# Patient Record
Sex: FEMALE | Race: WHITE | NOT HISPANIC OR LATINO | Employment: OTHER | URBAN - METROPOLITAN AREA
[De-identification: names, ages, dates, MRNs, and addresses within clinical notes are randomized per-mention and may not be internally consistent; named-entity substitution may affect disease eponyms.]

---

## 2017-01-03 ENCOUNTER — TELEPHONE (OUTPATIENT)
Dept: FAMILY MEDICINE CLINIC | Facility: CLINIC | Age: 72
End: 2017-01-03

## 2017-01-03 RX ORDER — SIMVASTATIN 20 MG
20 TABLET ORAL NIGHTLY
Qty: 90 TABLET | Refills: 3 | Status: SHIPPED | OUTPATIENT
Start: 2017-01-03 | End: 2017-07-27 | Stop reason: SDUPTHER

## 2017-01-03 NOTE — TELEPHONE ENCOUNTER
----- Message from Malia Triplett sent at 1/3/2017  8:42 AM EST -----  Regarding: MED REFILL  PATIENT NEEDS A 90 DAY SUPPLY OF SIMVASTATIN 20 MG TO [facility] ON FILE. PATIENT ONLY HAS 2 PILLS LEFT.

## 2017-06-23 ENCOUNTER — OFFICE VISIT (OUTPATIENT)
Dept: FAMILY MEDICINE CLINIC | Facility: CLINIC | Age: 72
End: 2017-06-23

## 2017-06-23 VITALS
HEIGHT: 65 IN | DIASTOLIC BLOOD PRESSURE: 62 MMHG | WEIGHT: 155.8 LBS | OXYGEN SATURATION: 98 % | TEMPERATURE: 98.1 F | BODY MASS INDEX: 25.96 KG/M2 | SYSTOLIC BLOOD PRESSURE: 108 MMHG | HEART RATE: 82 BPM

## 2017-06-23 DIAGNOSIS — J06.9 ACUTE URI: Primary | ICD-10-CM

## 2017-06-23 PROCEDURE — 99213 OFFICE O/P EST LOW 20 MIN: CPT | Performed by: FAMILY MEDICINE

## 2017-06-23 RX ORDER — GUAIFENESIN AND CODEINE PHOSPHATE 100; 10 MG/5ML; MG/5ML
5 SOLUTION ORAL 3 TIMES DAILY PRN
Qty: 118 ML | Refills: 0 | Status: SHIPPED | OUTPATIENT
Start: 2017-06-23 | End: 2017-07-27

## 2017-06-23 RX ORDER — AZITHROMYCIN 250 MG/1
TABLET, FILM COATED ORAL
Qty: 5 TABLET | Refills: 0 | Status: SHIPPED | OUTPATIENT
Start: 2017-06-23 | End: 2017-07-27

## 2017-06-23 NOTE — PROGRESS NOTES
Subjective   Jacques Yeboah is a 72 y.o. female.     History of Present Illness   THREE DAYS OF COUGH, HEAD CONGESTION, NO FEVER.  No GI upset. Took no medication.   The following portions of the patient's history were reviewed and updated as appropriate: allergies, current medications, past family history, past medical history, past social history, past surgical history and problem list.    Review of Systems   Constitutional: Negative for fever.   HENT: Positive for congestion. Negative for ear pain, sinus pressure and sneezing.    Respiratory: Positive for cough (X2 DAYS). Negative for shortness of breath.    Cardiovascular: Negative.    Musculoskeletal: Negative.        Objective   Physical Exam   Constitutional: She appears well-developed.   HENT:   Right Ear: External ear normal.   Left Ear: External ear normal.   Mouth/Throat: Oropharynx is clear and moist.   Head congestion   Neck: Neck supple.   Pulmonary/Chest: Effort normal.   Lymphadenopathy:     She has no cervical adenopathy.   Vitals reviewed.      Assessment/Plan   Jacques was seen today for uri.    Diagnoses and all orders for this visit:    Acute URI  -     azithromycin (ZITHROMAX) 250 MG tablet; Take 2 tablets the first day, then 1 tablet daily for 4 days.  -     guaifenesin-codeine (GUAIFENESIN AC) 100-10 MG/5ML liquid; Take 5 mL by mouth 3 (Three) Times a Day As Needed for Cough.

## 2017-07-14 ENCOUNTER — APPOINTMENT (OUTPATIENT)
Dept: CT IMAGING | Facility: HOSPITAL | Age: 72
End: 2017-07-14

## 2017-07-14 ENCOUNTER — HOSPITAL ENCOUNTER (EMERGENCY)
Facility: HOSPITAL | Age: 72
Discharge: HOME | End: 2017-07-14
Attending: EMERGENCY MEDICINE | Admitting: EMERGENCY MEDICINE

## 2017-07-14 VITALS
HEIGHT: 65 IN | WEIGHT: 155 LBS | BODY MASS INDEX: 25.83 KG/M2 | TEMPERATURE: 98 F | OXYGEN SATURATION: 99 % | HEART RATE: 68 BPM | RESPIRATION RATE: 20 BRPM | SYSTOLIC BLOOD PRESSURE: 148 MMHG | DIASTOLIC BLOOD PRESSURE: 88 MMHG

## 2017-07-14 DIAGNOSIS — R10.9 ACUTE FLANK PAIN: Primary | ICD-10-CM

## 2017-07-14 LAB
ALBUMIN SERPL-MCNC: 4.5 G/DL (ref 3.2–4.8)
ALBUMIN/GLOB SERPL: 1.4 G/DL (ref 1.5–2.5)
ALP SERPL-CCNC: 66 U/L (ref 25–100)
ALT SERPL W P-5'-P-CCNC: 18 U/L (ref 7–40)
ANION GAP SERPL CALCULATED.3IONS-SCNC: 6 MMOL/L (ref 3–11)
AST SERPL-CCNC: 24 U/L (ref 0–33)
BACTERIA UR QL AUTO: ABNORMAL /HPF
BASOPHILS # BLD AUTO: 0.03 10*3/MM3 (ref 0–0.2)
BASOPHILS NFR BLD AUTO: 0.6 % (ref 0–1)
BILIRUB SERPL-MCNC: 0.3 MG/DL (ref 0.3–1.2)
BILIRUB UR QL STRIP: NEGATIVE
BUN BLD-MCNC: 22 MG/DL (ref 9–23)
BUN/CREAT SERPL: 31.4 (ref 7–25)
CALCIUM SPEC-SCNC: 10.3 MG/DL (ref 8.7–10.4)
CHLORIDE SERPL-SCNC: 100 MMOL/L (ref 99–109)
CLARITY UR: CLEAR
CO2 SERPL-SCNC: 28 MMOL/L (ref 20–31)
COLOR UR: YELLOW
CREAT BLD-MCNC: 0.7 MG/DL (ref 0.6–1.3)
DEPRECATED RDW RBC AUTO: 47 FL (ref 37–54)
EOSINOPHIL # BLD AUTO: 0.11 10*3/MM3 (ref 0–0.3)
EOSINOPHIL NFR BLD AUTO: 2 % (ref 0–3)
ERYTHROCYTE [DISTWIDTH] IN BLOOD BY AUTOMATED COUNT: 13.2 % (ref 11.3–14.5)
GFR SERPL CREATININE-BSD FRML MDRD: 82 ML/MIN/1.73
GLOBULIN UR ELPH-MCNC: 3.2 GM/DL
GLUCOSE BLD-MCNC: 106 MG/DL (ref 70–100)
GLUCOSE UR STRIP-MCNC: NEGATIVE MG/DL
HCT VFR BLD AUTO: 43.3 % (ref 34.5–44)
HGB BLD-MCNC: 13.8 G/DL (ref 11.5–15.5)
HGB UR QL STRIP.AUTO: NEGATIVE
HOLD SPECIMEN: NORMAL
HOLD SPECIMEN: NORMAL
HYALINE CASTS UR QL AUTO: ABNORMAL /LPF
IMM GRANULOCYTES # BLD: 0 10*3/MM3 (ref 0–0.03)
IMM GRANULOCYTES NFR BLD: 0 % (ref 0–0.6)
KETONES UR QL STRIP: NEGATIVE
LEUKOCYTE ESTERASE UR QL STRIP.AUTO: ABNORMAL
LIPASE SERPL-CCNC: 51 U/L (ref 6–51)
LYMPHOCYTES # BLD AUTO: 1.79 10*3/MM3 (ref 0.6–4.8)
LYMPHOCYTES NFR BLD AUTO: 33.2 % (ref 24–44)
MCH RBC QN AUTO: 30.8 PG (ref 27–31)
MCHC RBC AUTO-ENTMCNC: 31.9 G/DL (ref 32–36)
MCV RBC AUTO: 96.7 FL (ref 80–99)
MONOCYTES # BLD AUTO: 0.4 10*3/MM3 (ref 0–1)
MONOCYTES NFR BLD AUTO: 7.4 % (ref 0–12)
NEUTROPHILS # BLD AUTO: 3.06 10*3/MM3 (ref 1.5–8.3)
NEUTROPHILS NFR BLD AUTO: 56.8 % (ref 41–71)
NITRITE UR QL STRIP: NEGATIVE
PH UR STRIP.AUTO: 7 [PH] (ref 5–8)
PLATELET # BLD AUTO: 221 10*3/MM3 (ref 150–450)
PMV BLD AUTO: 9.7 FL (ref 6–12)
POTASSIUM BLD-SCNC: 4.1 MMOL/L (ref 3.5–5.5)
PROT SERPL-MCNC: 7.7 G/DL (ref 5.7–8.2)
PROT UR QL STRIP: NEGATIVE
RBC # BLD AUTO: 4.48 10*6/MM3 (ref 3.89–5.14)
RBC # UR: ABNORMAL /HPF
REF LAB TEST METHOD: ABNORMAL
SODIUM BLD-SCNC: 134 MMOL/L (ref 132–146)
SP GR UR STRIP: 1.01 (ref 1–1.03)
SQUAMOUS #/AREA URNS HPF: ABNORMAL /HPF
UROBILINOGEN UR QL STRIP: ABNORMAL
WBC NRBC COR # BLD: 5.39 10*3/MM3 (ref 3.5–10.8)
WBC UR QL AUTO: ABNORMAL /HPF
WHOLE BLOOD HOLD SPECIMEN: NORMAL
WHOLE BLOOD HOLD SPECIMEN: NORMAL

## 2017-07-14 PROCEDURE — 36415 COLL VENOUS BLD VENIPUNCTURE: CPT

## 2017-07-14 PROCEDURE — 74176 CT ABD & PELVIS W/O CONTRAST: CPT

## 2017-07-14 PROCEDURE — 83690 ASSAY OF LIPASE: CPT | Performed by: EMERGENCY MEDICINE

## 2017-07-14 PROCEDURE — 81001 URINALYSIS AUTO W/SCOPE: CPT | Performed by: EMERGENCY MEDICINE

## 2017-07-14 PROCEDURE — 80053 COMPREHEN METABOLIC PANEL: CPT | Performed by: EMERGENCY MEDICINE

## 2017-07-14 PROCEDURE — 99283 EMERGENCY DEPT VISIT LOW MDM: CPT

## 2017-07-14 PROCEDURE — 85025 COMPLETE CBC W/AUTO DIFF WBC: CPT | Performed by: EMERGENCY MEDICINE

## 2017-07-14 PROCEDURE — 87086 URINE CULTURE/COLONY COUNT: CPT | Performed by: EMERGENCY MEDICINE

## 2017-07-14 RX ORDER — SODIUM CHLORIDE 0.9 % (FLUSH) 0.9 %
10 SYRINGE (ML) INJECTION AS NEEDED
Status: DISCONTINUED | OUTPATIENT
Start: 2017-07-14 | End: 2017-07-14 | Stop reason: HOSPADM

## 2017-07-14 RX ORDER — HYDROCODONE BITARTRATE AND ACETAMINOPHEN 5; 325 MG/1; MG/1
1 TABLET ORAL EVERY 8 HOURS PRN
Qty: 10 TABLET | Refills: 0 | Status: SHIPPED | OUTPATIENT
Start: 2017-07-14 | End: 2017-07-27

## 2017-07-14 NOTE — DISCHARGE INSTRUCTIONS
Take medication as prescribed for pain.  No strenuous activity.  Your urine specimen today will be sent for culture.  We will contact you with any positive urine culture results.  Return to ER with worsening symptoms or development of fever, or new symptoms.

## 2017-07-14 NOTE — ED PROVIDER NOTES
Subjective   Patient is a 72 y.o. female presenting with flank pain.   History provided by:  Patient and relative  Flank Pain   Pain location:  L flank  Pain quality: aching and sharp    Pain radiates to:  Suprapubic region  Pain severity:  Moderate  Onset quality:  Gradual  Duration:  8 hours  Timing:  Constant  Progression:  Unchanged  Chronicity:  New  Context: awakening from sleep    Relieved by:  Lying down  Worsened by:  Nothing  Ineffective treatments:  None tried  Associated symptoms: no chills, no cough, no diarrhea, no dysuria, no fever, no hematuria, no nausea, no shortness of breath and no vomiting    Risk factors: being elderly        Review of Systems   Constitutional: Negative for chills and fever.   Respiratory: Negative for cough and shortness of breath.    Gastrointestinal: Negative for diarrhea, nausea and vomiting.   Genitourinary: Positive for flank pain. Negative for dysuria and hematuria.   Neurological: Negative for dizziness and light-headedness.   All other systems reviewed and are negative.      Past Medical History:   Diagnosis Date   • H/O bone density study    • History of colonoscopy    • History of mammogram    • Impacted cerumen of right ear     Ears needs rinsed out, feels clogged two days.        No Known Allergies    Past Surgical History:   Procedure Laterality Date   • TONSILLECTOMY     • TUBAL ABDOMINAL LIGATION         Family History   Problem Relation Age of Onset   • Heart failure Mother      CHF   • Heart attack Father      Acute MI   • Stroke Father      Stroke syndrome       Social History     Social History   • Marital status:      Spouse name: N/A   • Number of children: N/A   • Years of education: N/A     Social History Main Topics   • Smoking status: Never Smoker   • Smokeless tobacco: Never Used   • Alcohol use No   • Drug use: No   • Sexual activity: Not Asked     Other Topics Concern   • None     Social History Narrative           Objective   Physical Exam  "  Constitutional: She is oriented to person, place, and time. She appears well-developed and well-nourished.   HENT:   Right Ear: External ear normal.   Left Ear: External ear normal.   Cardiovascular: Normal rate, regular rhythm, normal heart sounds and intact distal pulses.    Pulmonary/Chest: Effort normal and breath sounds normal.   Abdominal: Soft. Bowel sounds are normal. She exhibits no distension. There is tenderness (mild suprapubic) in the suprapubic area. There is no CVA tenderness and no tenderness at McBurney's point.   Neurological: She is alert and oriented to person, place, and time.   Skin: Skin is warm and dry.   Psychiatric: She has a normal mood and affect. Her behavior is normal.       Procedures         ED Course  ED Course   Value Comment By Time   CT Abdomen Pelvis Without Contrast (Reviewed) Gricelda Rothman, XI 07/14 1814      No results found for this or any previous visit (from the past 24 hour(s)).  Note: In addition to lab results from this visit, the labs listed above may include labs taken at another facility or during a different encounter within the last 24 hours. Please correlate lab times with ED admission and discharge times for further clarification of the services performed during this visit.    CT Abdomen Pelvis Without Contrast   Final Result   Negative CT scan of the abdomen pelvis. There is   specifically no evidence of renal mass, stone or obstructive uropathy.       DICTATED:     07/14/2017   EDITED:         07/14/2017           This report was finalized on 7/14/2017 5:51 PM by Dr. Kevin Maxwell MD.            Vitals:    07/14/17 1359 07/14/17 1910   BP: 165/72 148/88   BP Location: Left arm    Patient Position: Sitting    Pulse: 67 68   Resp: 18 20   Temp: 98 °F (36.7 °C)    TempSrc: Oral    SpO2: 99%    Weight: 155 lb (70.3 kg)    Height: 65\" (165.1 cm)      Medications - No data to display  ECG/EMG Results (last 24 hours)     ** No results found for the last 24 hours. ** "        Discussed lab and CT findings with pt and family . Explained that urine will be sent for culture and that if something does grow out, we will contact her at home and treat as appropriate. Pt advised to f/u with PCP in 2-3 days or return to ED with worsening of symptoms or development of new symptoms. Pt and family verb understanding/,      ADRIAN query complete. Treatment plan to include limited course of prescribed  controlled substance. Risks including addiction, benefits, and alternatives presented to patient.           MDM    Final diagnoses:   Acute flank pain            Gricelda Rothman, XI  07/25/17 0650

## 2017-07-16 LAB — BACTERIA SPEC AEROBE CULT: NORMAL

## 2017-07-27 ENCOUNTER — OFFICE VISIT (OUTPATIENT)
Dept: FAMILY MEDICINE CLINIC | Facility: CLINIC | Age: 72
End: 2017-07-27

## 2017-07-27 VITALS
BODY MASS INDEX: 25.46 KG/M2 | SYSTOLIC BLOOD PRESSURE: 120 MMHG | HEART RATE: 68 BPM | DIASTOLIC BLOOD PRESSURE: 80 MMHG | WEIGHT: 153 LBS | TEMPERATURE: 98.1 F | RESPIRATION RATE: 16 BRPM

## 2017-07-27 DIAGNOSIS — E78.5 HYPERLIPIDEMIA, UNSPECIFIED HYPERLIPIDEMIA TYPE: Primary | ICD-10-CM

## 2017-07-27 LAB
ALBUMIN SERPL-MCNC: 4.4 G/DL (ref 3.2–4.8)
ALBUMIN/GLOB SERPL: 1.6 G/DL (ref 1.5–2.5)
ALP SERPL-CCNC: 62 U/L (ref 25–100)
ALT SERPL W P-5'-P-CCNC: 20 U/L (ref 7–40)
ANION GAP SERPL CALCULATED.3IONS-SCNC: 1 MMOL/L (ref 3–11)
ARTICHOKE IGE QN: 81 MG/DL (ref 0–130)
AST SERPL-CCNC: 21 U/L (ref 0–33)
BASOPHILS # BLD AUTO: 0.03 10*3/MM3 (ref 0–0.2)
BASOPHILS NFR BLD AUTO: 0.7 % (ref 0–1)
BILIRUB SERPL-MCNC: 0.5 MG/DL (ref 0.3–1.2)
BUN BLD-MCNC: 25 MG/DL (ref 9–23)
BUN/CREAT SERPL: 35.7 (ref 7–25)
CALCIUM SPEC-SCNC: 9.9 MG/DL (ref 8.7–10.4)
CHLORIDE SERPL-SCNC: 105 MMOL/L (ref 99–109)
CHOLEST SERPL-MCNC: 153 MG/DL (ref 0–200)
CO2 SERPL-SCNC: 34 MMOL/L (ref 20–31)
CREAT BLD-MCNC: 0.7 MG/DL (ref 0.6–1.3)
DEPRECATED RDW RBC AUTO: 47.2 FL (ref 37–54)
EOSINOPHIL # BLD AUTO: 0.09 10*3/MM3 (ref 0–0.3)
EOSINOPHIL NFR BLD AUTO: 2 % (ref 0–3)
ERYTHROCYTE [DISTWIDTH] IN BLOOD BY AUTOMATED COUNT: 13.3 % (ref 11.3–14.5)
GFR SERPL CREATININE-BSD FRML MDRD: 82 ML/MIN/1.73
GLOBULIN UR ELPH-MCNC: 2.7 GM/DL
GLUCOSE BLD-MCNC: 93 MG/DL (ref 70–100)
HCT VFR BLD AUTO: 43.6 % (ref 34.5–44)
HDLC SERPL-MCNC: 50 MG/DL (ref 40–60)
HGB BLD-MCNC: 13.7 G/DL (ref 11.5–15.5)
IMM GRANULOCYTES # BLD: 0 10*3/MM3 (ref 0–0.03)
IMM GRANULOCYTES NFR BLD: 0 % (ref 0–0.6)
LYMPHOCYTES # BLD AUTO: 1.55 10*3/MM3 (ref 0.6–4.8)
LYMPHOCYTES NFR BLD AUTO: 34.1 % (ref 24–44)
MCH RBC QN AUTO: 30.5 PG (ref 27–31)
MCHC RBC AUTO-ENTMCNC: 31.4 G/DL (ref 32–36)
MCV RBC AUTO: 97.1 FL (ref 80–99)
MONOCYTES # BLD AUTO: 0.4 10*3/MM3 (ref 0–1)
MONOCYTES NFR BLD AUTO: 8.8 % (ref 0–12)
NEUTROPHILS # BLD AUTO: 2.47 10*3/MM3 (ref 1.5–8.3)
NEUTROPHILS NFR BLD AUTO: 54.4 % (ref 41–71)
PLATELET # BLD AUTO: 249 10*3/MM3 (ref 150–450)
PMV BLD AUTO: 10.3 FL (ref 6–12)
POTASSIUM BLD-SCNC: 4.8 MMOL/L (ref 3.5–5.5)
PROT SERPL-MCNC: 7.1 G/DL (ref 5.7–8.2)
RBC # BLD AUTO: 4.49 10*6/MM3 (ref 3.89–5.14)
SODIUM BLD-SCNC: 140 MMOL/L (ref 132–146)
TRIGL SERPL-MCNC: 90 MG/DL (ref 0–150)
WBC NRBC COR # BLD: 4.54 10*3/MM3 (ref 3.5–10.8)

## 2017-07-27 PROCEDURE — 80061 LIPID PANEL: CPT | Performed by: FAMILY MEDICINE

## 2017-07-27 PROCEDURE — 85025 COMPLETE CBC W/AUTO DIFF WBC: CPT | Performed by: FAMILY MEDICINE

## 2017-07-27 PROCEDURE — 99213 OFFICE O/P EST LOW 20 MIN: CPT | Performed by: FAMILY MEDICINE

## 2017-07-27 PROCEDURE — 80053 COMPREHEN METABOLIC PANEL: CPT | Performed by: FAMILY MEDICINE

## 2017-07-27 PROCEDURE — 36415 COLL VENOUS BLD VENIPUNCTURE: CPT | Performed by: FAMILY MEDICINE

## 2017-07-27 NOTE — PROGRESS NOTES
Subjective   Jacques Yeboah is a 72 y.o. female.     History of Present Illness   Scheduled for total right hip replacement in September.    Needs cholesterol level.   The following portions of the patient's history were reviewed and updated as appropriate: allergies, current medications, past family history, past medical history, past social history, past surgical history and problem list.    Review of Systems   Constitutional: Negative.    Respiratory: Negative.    Cardiovascular: Negative.    Gastrointestinal: Negative for abdominal distention, abdominal pain and constipation.   Musculoskeletal: Positive for arthralgias.   Skin: Negative.        Objective   Physical Exam   Constitutional: She appears well-developed and well-nourished. No distress.   Pulmonary/Chest: Effort normal.   Musculoskeletal: She exhibits no edema.   Skin: Skin is warm.   Psychiatric: She has a normal mood and affect.   Vitals reviewed.      Assessment/Plan   Jacques was seen today for labs only.    Diagnoses and all orders for this visit:    Hyperlipidemia, unspecified hyperlipidemia type  -     Lipid Panel  -     Comprehensive Metabolic Panel  -     CBC & Differential

## 2017-09-05 ENCOUNTER — TELEPHONE (OUTPATIENT)
Dept: ORTHOPEDIC SURGERY | Facility: CLINIC | Age: 72
End: 2017-09-05

## 2017-09-05 NOTE — TELEPHONE ENCOUNTER
Patient would like a refill of Tramadol 50mg, she has 10 pills left. She is scheduled 10/3/17 for total hip surgery.

## 2017-09-21 ENCOUNTER — APPOINTMENT (OUTPATIENT)
Dept: PREADMISSION TESTING | Facility: HOSPITAL | Age: 72
End: 2017-09-21

## 2017-09-21 ENCOUNTER — HOSPITAL ENCOUNTER (OUTPATIENT)
Dept: GENERAL RADIOLOGY | Facility: HOSPITAL | Age: 72
Discharge: HOME | End: 2017-09-21
Admitting: ORTHOPAEDIC SURGERY

## 2017-09-21 VITALS — BODY MASS INDEX: 24.98 KG/M2 | WEIGHT: 149.91 LBS | HEIGHT: 65 IN

## 2017-09-21 DIAGNOSIS — Z01.89 LABORATORY TEST: Primary | ICD-10-CM

## 2017-09-21 LAB
ABO GROUP BLD: NORMAL
ANION GAP SERPL CALCULATED.3IONS-SCNC: 4 MMOL/L (ref 3–11)
APTT PPP: 26.8 SECONDS (ref 24–31)
BASOPHILS # BLD AUTO: 0.02 10*3/MM3 (ref 0–0.2)
BASOPHILS NFR BLD AUTO: 0.4 % (ref 0–1)
BILIRUB UR QL STRIP: NEGATIVE
BUN BLD-MCNC: 24 MG/DL (ref 9–23)
BUN/CREAT SERPL: 34.3 (ref 7–25)
CALCIUM SPEC-SCNC: 9.3 MG/DL (ref 8.7–10.4)
CHLORIDE SERPL-SCNC: 105 MMOL/L (ref 99–109)
CLARITY UR: CLEAR
CO2 SERPL-SCNC: 30 MMOL/L (ref 20–31)
COLOR UR: YELLOW
CREAT BLD-MCNC: 0.7 MG/DL (ref 0.6–1.3)
CRP SERPL-MCNC: 0.07 MG/DL (ref 0–1)
DEPRECATED RDW RBC AUTO: 46.5 FL (ref 37–54)
EOSINOPHIL # BLD AUTO: 0.09 10*3/MM3 (ref 0–0.3)
EOSINOPHIL NFR BLD AUTO: 1.8 % (ref 0–3)
ERYTHROCYTE [DISTWIDTH] IN BLOOD BY AUTOMATED COUNT: 13.2 % (ref 11.3–14.5)
ERYTHROCYTE [SEDIMENTATION RATE] IN BLOOD: 22 MM/HR (ref 0–30)
GFR SERPL CREATININE-BSD FRML MDRD: 82 ML/MIN/1.73
GLUCOSE BLD-MCNC: 112 MG/DL (ref 70–100)
GLUCOSE UR STRIP-MCNC: NEGATIVE MG/DL
HBA1C MFR BLD: 5.4 % (ref 4.8–5.6)
HCT VFR BLD AUTO: 38.8 % (ref 34.5–44)
HGB BLD-MCNC: 12.8 G/DL (ref 11.5–15.5)
HGB UR QL STRIP.AUTO: NEGATIVE
IMM GRANULOCYTES # BLD: 0.01 10*3/MM3 (ref 0–0.03)
IMM GRANULOCYTES NFR BLD: 0.2 % (ref 0–0.6)
INR PPP: 0.97
KETONES UR QL STRIP: NEGATIVE
LEUKOCYTE ESTERASE UR QL STRIP.AUTO: NEGATIVE
LYMPHOCYTES # BLD AUTO: 1.63 10*3/MM3 (ref 0.6–4.8)
LYMPHOCYTES NFR BLD AUTO: 33.1 % (ref 24–44)
MCH RBC QN AUTO: 31.2 PG (ref 27–31)
MCHC RBC AUTO-ENTMCNC: 33 G/DL (ref 32–36)
MCV RBC AUTO: 94.6 FL (ref 80–99)
MONOCYTES # BLD AUTO: 0.26 10*3/MM3 (ref 0–1)
MONOCYTES NFR BLD AUTO: 5.3 % (ref 0–12)
NEUTROPHILS # BLD AUTO: 2.91 10*3/MM3 (ref 1.5–8.3)
NEUTROPHILS NFR BLD AUTO: 59.2 % (ref 41–71)
NITRITE UR QL STRIP: NEGATIVE
PH UR STRIP.AUTO: 7 [PH] (ref 5–8)
PLATELET # BLD AUTO: 236 10*3/MM3 (ref 150–450)
PMV BLD AUTO: 9.4 FL (ref 6–12)
POTASSIUM BLD-SCNC: 3.9 MMOL/L (ref 3.5–5.5)
PROT UR QL STRIP: NEGATIVE
PROTHROMBIN TIME: 10.6 SECONDS (ref 9.6–11.5)
RBC # BLD AUTO: 4.1 10*6/MM3 (ref 3.89–5.14)
RH BLD: NEGATIVE
SODIUM BLD-SCNC: 139 MMOL/L (ref 132–146)
SP GR UR STRIP: 1.01 (ref 1–1.03)
UROBILINOGEN UR QL STRIP: NORMAL
WBC NRBC COR # BLD: 4.92 10*3/MM3 (ref 3.5–10.8)

## 2017-09-21 PROCEDURE — 83036 HEMOGLOBIN GLYCOSYLATED A1C: CPT | Performed by: ORTHOPAEDIC SURGERY

## 2017-09-21 PROCEDURE — 86140 C-REACTIVE PROTEIN: CPT | Performed by: ORTHOPAEDIC SURGERY

## 2017-09-21 PROCEDURE — 85730 THROMBOPLASTIN TIME PARTIAL: CPT | Performed by: ORTHOPAEDIC SURGERY

## 2017-09-21 PROCEDURE — 85610 PROTHROMBIN TIME: CPT | Performed by: ORTHOPAEDIC SURGERY

## 2017-09-21 PROCEDURE — 86901 BLOOD TYPING SEROLOGIC RH(D): CPT

## 2017-09-21 PROCEDURE — 80048 BASIC METABOLIC PNL TOTAL CA: CPT | Performed by: ORTHOPAEDIC SURGERY

## 2017-09-21 PROCEDURE — 36415 COLL VENOUS BLD VENIPUNCTURE: CPT

## 2017-09-21 PROCEDURE — 85652 RBC SED RATE AUTOMATED: CPT | Performed by: ORTHOPAEDIC SURGERY

## 2017-09-21 PROCEDURE — 93005 ELECTROCARDIOGRAM TRACING: CPT

## 2017-09-21 PROCEDURE — 93010 ELECTROCARDIOGRAM REPORT: CPT | Performed by: INTERNAL MEDICINE

## 2017-09-21 PROCEDURE — 81003 URINALYSIS AUTO W/O SCOPE: CPT | Performed by: ORTHOPAEDIC SURGERY

## 2017-09-21 PROCEDURE — 86900 BLOOD TYPING SEROLOGIC ABO: CPT

## 2017-09-21 PROCEDURE — 85025 COMPLETE CBC W/AUTO DIFF WBC: CPT | Performed by: ORTHOPAEDIC SURGERY

## 2017-09-21 PROCEDURE — 71020 HC CHEST PA AND LATERAL: CPT

## 2017-09-21 RX ORDER — PHENOL 1.4 %
600 AEROSOL, SPRAY (ML) MUCOUS MEMBRANE 2 TIMES DAILY WITH MEALS
COMMUNITY
End: 2019-04-03 | Stop reason: ALTCHOICE

## 2017-09-21 RX ORDER — SIMVASTATIN 20 MG
20 TABLET ORAL NIGHTLY
COMMUNITY
End: 2018-07-27 | Stop reason: SDUPTHER

## 2017-09-21 ASSESSMENT — HOOS JR
HOOS JR SCORE: 11
HOOS JR SCORE: 55.985

## 2017-09-21 NOTE — PAT
Johnie calix measurements:  Length: 24  Width: 18  Too early for type and screen; please draw the morning of surgery.  Patient attended joint class on 9-21-17.

## 2017-09-25 ENCOUNTER — OFFICE VISIT (OUTPATIENT)
Dept: ORTHOPEDIC SURGERY | Facility: CLINIC | Age: 72
End: 2017-09-25

## 2017-09-25 VITALS
WEIGHT: 143.6 LBS | HEIGHT: 65 IN | HEART RATE: 71 BPM | SYSTOLIC BLOOD PRESSURE: 152 MMHG | DIASTOLIC BLOOD PRESSURE: 81 MMHG | BODY MASS INDEX: 23.93 KG/M2

## 2017-09-25 DIAGNOSIS — M16.11 PRIMARY OSTEOARTHRITIS OF RIGHT HIP: Primary | ICD-10-CM

## 2017-09-25 PROCEDURE — 99213 OFFICE O/P EST LOW 20 MIN: CPT | Performed by: ORTHOPAEDIC SURGERY

## 2017-09-25 NOTE — PROGRESS NOTES
[facility] [place]    Subjective     Chief Complaint   Patient presents with   • Right Knee - Follow-up     Pre op         HPI    Jacques Yeboah is a 72 y.o. female. She presents today prior to embarking on her right total hip arthroplasty.  She has been to her preadmission testing.  Pain has been continuous in the hip, which is moderate in severity most of the time, but can be severe, worse with standing and walking.  She still desires to proceed with her right total hip arthroplasty next week.      Patient Active Problem List   Diagnosis   • Hyperlipidemia     Past Medical History:   Diagnosis Date   • Arthritis    • H/O bone density study    • Hip pain    • History of colonoscopy    • History of mammogram    • Impacted cerumen of right ear     Ears needs rinsed out, feels clogged two days.    • Inflammatory arthritis    • Lumbago    • Lumbar degenerative disc disease    • Metatarsalgia, left foot    • Osteoarthritis of knees, bilateral    • Pelvic fracture    • Primary osteoarthritis of both hips    • Sciatica    • Thoracic spondylosis    • Wears glasses       Past Surgical History:   Procedure Laterality Date   • COLONOSCOPY     • EYE SURGERY     • FRACTURE SURGERY      left wrist; pelvis   • TONSILLECTOMY     • TUBAL ABDOMINAL LIGATION        Family History   Problem Relation Age of Onset   • Heart failure Mother      CHF   • Osteoarthritis Mother    • Heart attack Father      Acute MI   • Stroke Father      Stroke syndrome     Social History     Social History   • Marital status:      Spouse name: N/A   • Number of children: N/A   • Years of education: N/A     Occupational History   • Not on file.     Social History Main Topics   • Smoking status: Never Smoker   • Smokeless tobacco: Never Used   • Alcohol use No   • Drug use: No   • Sexual activity: Defer     Other Topics Concern   • Not on file     Social History Narrative      Current Outpatient Prescriptions on File Prior  to Visit   Medication Sig Dispense Refill   • calcium carbonate (OS-SKYLER) 600 MG tablet Take 600 mg by mouth 2 (Two) Times a Day With Meals.     • simvastatin (ZOCOR) 20 MG tablet Take 20 mg by mouth Every Night.       No current facility-administered medications on file prior to visit.       No Known Allergies     Review of Systems   Constitutional: Negative for activity change, appetite change, chills, diaphoresis, fatigue, fever and unexpected weight change.   HENT: Negative for congestion, dental problem, drooling, ear discharge, ear pain, facial swelling, hearing loss, mouth sores, nosebleeds, postnasal drip, rhinorrhea, sinus pressure, sneezing, sore throat, tinnitus, trouble swallowing and voice change.    Eyes: Negative for photophobia, pain, discharge, redness, itching and visual disturbance.   Respiratory: Negative for apnea, cough, choking, chest tightness, shortness of breath, wheezing and stridor.    Cardiovascular: Negative for chest pain, palpitations and leg swelling.   Gastrointestinal: Negative for abdominal distention, abdominal pain, anal bleeding, blood in stool, constipation, diarrhea, nausea, rectal pain and vomiting.   Endocrine: Negative for cold intolerance, heat intolerance, polydipsia, polyphagia and polyuria.   Genitourinary: Negative for decreased urine volume, difficulty urinating, dysuria, enuresis, flank pain, frequency, genital sores, hematuria and urgency.   Musculoskeletal: Positive for arthralgias, back pain and gait problem. Negative for joint swelling, myalgias, neck pain and neck stiffness.   Skin: Negative for color change, pallor, rash and wound.   Allergic/Immunologic: Negative for environmental allergies, food allergies and immunocompromised state.   Neurological: Negative for dizziness, tremors, seizures, syncope, facial asymmetry, speech difficulty, weakness, light-headedness, numbness and headaches.   Hematological: Negative for adenopathy. Does not bruise/bleed easily.  "  Psychiatric/Behavioral: Negative for agitation, behavioral problems, confusion, decreased concentration, dysphoric mood, hallucinations, self-injury, sleep disturbance and suicidal ideas. The patient is not nervous/anxious and is not hyperactive.         Objective      Physical Exam  /81  Pulse 71  Ht 64.6\" (164.1 cm)  Wt 143 lb 9.6 oz (65.1 kg)  BMI 24.19 kg/m2    Body mass index is 24.19 kg/(m^2).    General:   Mental Status:  Alert   Appearance: Cooperative, in no acute distress   Build and Nutrition: Well-nourished and well developed female   Orientation: Alert and oriented to person, place and time   Posture: Normal   Gait: Normal      Lower Extremity:   Right Hip:    Tenderness:  None    Swelling:  None    Crepitus:  None    Range of motion:  External Rotation: 30°       Internal Rotation: 20°, with pain in the groin       Flexion:  90°       Extension:  0°    Deformities:  None  Functional testing: Positive Stinchfield    No leg length discrepancy      Imaging/Studies  Previous radiographs were reviewed, which showed advanced arthritic changes in the hip joint, with bone-on-bone contact and osteophyte formation.      Assessment and Plan     Jacques was seen today for follow-up.    Diagnoses and all orders for this visit:    Primary osteoarthritis of right hip      I reviewed my findings again with the patient today.  She is ready to proceed with her right total hip arthroplasty.  Her  was present for the discussion today.  Questions were answered.  I will see her for surgery next week, but sooner for any problems.    Return in about 4 weeks (around 10/23/2017) for After Surgery.     Surgical Counseling     I have informed the patient of the diagnosis and the prognosis.  Exhaustive conservative treatment modalities have not resulted in long term pain relief.  The symptoms have progressed to the point of daily pain and inability to perform activities of daily living without significant pain.  " The patient has reached the point of desiring to proceed with total hip arthroplasty after discussing the risks, benefits and alternatives to the procedure.  The surgical procedure itself was discussed in detail.  Risks of the procedure were discussed, which included but are not limited to, bleeding, infection, damage to blood vessels and nerves, loosening of the prosthesis, deep infection, need for further surgery, leg length discrepancy, hip dislocation, loss of limb, deep venous thrombosis, pulmonary embolus, death, heart attack, stroke, kidney failure, liver failure, and anesthetic complications.  In addition, the potential for deep infection developing in the future was discussed, which could require further surgery.  The hip would have to be re-opened, debrided, and potentially remove the prosthesis, which may or may not be replaced in the future.  Also, the possibility for loosening of the prosthesis has been mentioned.  If the prosthesis loosened, a revision arthroplasty could be performed, with results that are not as predictable compared to the original procedure.  The typical rehabilitative course has also been discussed, and full recovery may take up to a year to see the maximum benefit.  The importance of patient cooperation in the rehabilitative efforts has also been discussed.  No guarantees whatsoever were given.  The patient understands the potential risks versus the benefits and desires to proceed with total hip arthroplasty at a mutually convenient time.      Medical Decision Making  Management Options : major surgery with risk factors  Data/Risk: independent visualization of imaging, lab tests, or EMG/NCV      Prem Hodges MD  09/25/17  11:13 AM

## 2017-10-03 ENCOUNTER — ANESTHESIA EVENT (OUTPATIENT)
Dept: PERIOP | Facility: HOSPITAL | Age: 72
End: 2017-10-03

## 2017-10-03 ENCOUNTER — HOSPITAL ENCOUNTER (INPATIENT)
Facility: HOSPITAL | Age: 72
LOS: 1 days | Discharge: HOME | End: 2017-10-04
Attending: ORTHOPAEDIC SURGERY | Admitting: ORTHOPAEDIC SURGERY

## 2017-10-03 ENCOUNTER — ANESTHESIA (OUTPATIENT)
Dept: PERIOP | Facility: HOSPITAL | Age: 72
End: 2017-10-03

## 2017-10-03 ENCOUNTER — APPOINTMENT (OUTPATIENT)
Dept: GENERAL RADIOLOGY | Facility: HOSPITAL | Age: 72
End: 2017-10-03

## 2017-10-03 DIAGNOSIS — Z74.09 IMPAIRED MOBILITY AND ADLS: ICD-10-CM

## 2017-10-03 DIAGNOSIS — Z74.09 IMPAIRED FUNCTIONAL MOBILITY, BALANCE, GAIT, AND ENDURANCE: Primary | ICD-10-CM

## 2017-10-03 DIAGNOSIS — Z96.641 STATUS POST TOTAL REPLACEMENT OF RIGHT HIP: ICD-10-CM

## 2017-10-03 DIAGNOSIS — Z78.9 IMPAIRED MOBILITY AND ADLS: ICD-10-CM

## 2017-10-03 PROBLEM — M16.11 ARTHRITIS OF RIGHT HIP: Status: ACTIVE | Noted: 2017-10-03

## 2017-10-03 LAB
ABO GROUP BLD: NORMAL
BLD GP AB SCN SERPL QL: NEGATIVE
RH BLD: NEGATIVE

## 2017-10-03 PROCEDURE — C1755 CATHETER, INTRASPINAL: HCPCS | Performed by: ORTHOPAEDIC SURGERY

## 2017-10-03 PROCEDURE — 86901 BLOOD TYPING SEROLOGIC RH(D): CPT | Performed by: ORTHOPAEDIC SURGERY

## 2017-10-03 PROCEDURE — C1776 JOINT DEVICE (IMPLANTABLE): HCPCS | Performed by: ORTHOPAEDIC SURGERY

## 2017-10-03 PROCEDURE — 25010000002 ONDANSETRON PER 1 MG: Performed by: NURSE ANESTHETIST, CERTIFIED REGISTERED

## 2017-10-03 PROCEDURE — 25010000002 ROPIVACAINE PER 1 MG: Performed by: ORTHOPAEDIC SURGERY

## 2017-10-03 PROCEDURE — 86900 BLOOD TYPING SEROLOGIC ABO: CPT | Performed by: ORTHOPAEDIC SURGERY

## 2017-10-03 PROCEDURE — 25010000003 CEFAZOLIN IN DEXTROSE 2-4 GM/100ML-% SOLUTION: Performed by: ORTHOPAEDIC SURGERY

## 2017-10-03 PROCEDURE — 25010000002 DEXAMETHASONE PER 1 MG: Performed by: NURSE ANESTHETIST, CERTIFIED REGISTERED

## 2017-10-03 PROCEDURE — 27130 TOTAL HIP ARTHROPLASTY: CPT | Performed by: ORTHOPAEDIC SURGERY

## 2017-10-03 PROCEDURE — 97116 GAIT TRAINING THERAPY: CPT

## 2017-10-03 PROCEDURE — 86850 RBC ANTIBODY SCREEN: CPT | Performed by: ORTHOPAEDIC SURGERY

## 2017-10-03 PROCEDURE — 0SR904Z REPLACEMENT OF RIGHT HIP JOINT WITH CERAMIC ON POLYETHYLENE SYNTHETIC SUBSTITUTE, OPEN APPROACH: ICD-10-PCS | Performed by: ORTHOPAEDIC SURGERY

## 2017-10-03 PROCEDURE — 73502 X-RAY EXAM HIP UNI 2-3 VIEWS: CPT

## 2017-10-03 PROCEDURE — 25010000002 PROPOFOL 1000 MG/ML EMULSION: Performed by: NURSE ANESTHETIST, CERTIFIED REGISTERED

## 2017-10-03 PROCEDURE — 25010000003 MORPHINE PER 10 MG: Performed by: ORTHOPAEDIC SURGERY

## 2017-10-03 PROCEDURE — 25010000002 FENTANYL CITRATE (PF) 100 MCG/2ML SOLUTION: Performed by: NURSE ANESTHETIST, CERTIFIED REGISTERED

## 2017-10-03 PROCEDURE — 97162 PT EVAL MOD COMPLEX 30 MIN: CPT

## 2017-10-03 DEVICE — PINNACLE HIP SOLUTIONS ALTRX POLYETHYLENE ACETABULAR LINER +4 NEUTRAL 32MM ID 50MM OD
Type: IMPLANTABLE DEVICE | Site: HIP | Status: FUNCTIONAL
Brand: PINNACLE ALTRX

## 2017-10-03 DEVICE — PINNACLE GRIPTION ACETABULAR SHELL SECTOR 50MM OD
Type: IMPLANTABLE DEVICE | Site: HIP | Status: FUNCTIONAL
Brand: PINNACLE GRIPTION

## 2017-10-03 DEVICE — TOTL HIP COA DEPUY 9641334: Type: IMPLANTABLE DEVICE | Site: HIP | Status: FUNCTIONAL

## 2017-10-03 DEVICE — BIOLOX DELTA CERAMIC FEMORAL HEAD 32MM DIA +1 12/14 TAPER
Type: IMPLANTABLE DEVICE | Site: HIP | Status: FUNCTIONAL
Brand: BIOLOX DELTA

## 2017-10-03 DEVICE — TOTL HIP GRIPTION CUP DEPUY UPCHRG: Type: IMPLANTABLE DEVICE | Site: HIP | Status: FUNCTIONAL

## 2017-10-03 DEVICE — SUMMIT FEMORAL STEM 12/14 TAPER TAPER ED W/POROCOAT SIZE 5 HI 145MM
Type: IMPLANTABLE DEVICE | Site: HIP | Status: FUNCTIONAL
Brand: SUMMIT POROCOAT

## 2017-10-03 RX ORDER — CEFAZOLIN SODIUM 2 G/100ML
2 INJECTION, SOLUTION INTRAVENOUS ONCE
Status: COMPLETED | OUTPATIENT
Start: 2017-10-03 | End: 2017-10-03

## 2017-10-03 RX ORDER — ASPIRIN 325 MG
325 TABLET, DELAYED RELEASE (ENTERIC COATED) ORAL DAILY
Status: DISCONTINUED | OUTPATIENT
Start: 2017-10-04 | End: 2017-10-04 | Stop reason: HOSPADM

## 2017-10-03 RX ORDER — ATORVASTATIN CALCIUM 10 MG/1
10 TABLET, FILM COATED ORAL DAILY
Status: DISCONTINUED | OUTPATIENT
Start: 2017-10-03 | End: 2017-10-04 | Stop reason: HOSPADM

## 2017-10-03 RX ORDER — ACETAMINOPHEN 500 MG
1000 TABLET ORAL ONCE
Status: COMPLETED | OUTPATIENT
Start: 2017-10-03 | End: 2017-10-03

## 2017-10-03 RX ORDER — CELECOXIB 200 MG/1
200 CAPSULE ORAL ONCE
Status: COMPLETED | OUTPATIENT
Start: 2017-10-03 | End: 2017-10-03

## 2017-10-03 RX ORDER — DEXAMETHASONE SODIUM PHOSPHATE 4 MG/ML
INJECTION, SOLUTION INTRA-ARTICULAR; INTRALESIONAL; INTRAMUSCULAR; INTRAVENOUS; SOFT TISSUE AS NEEDED
Status: DISCONTINUED | OUTPATIENT
Start: 2017-10-03 | End: 2017-10-03 | Stop reason: SURG

## 2017-10-03 RX ORDER — PROMETHAZINE HYDROCHLORIDE 25 MG/ML
6.25 INJECTION, SOLUTION INTRAMUSCULAR; INTRAVENOUS ONCE AS NEEDED
Status: DISCONTINUED | OUTPATIENT
Start: 2017-10-03 | End: 2017-10-03 | Stop reason: HOSPADM

## 2017-10-03 RX ORDER — SODIUM CHLORIDE 0.9 % (FLUSH) 0.9 %
1-10 SYRINGE (ML) INJECTION AS NEEDED
Status: DISCONTINUED | OUTPATIENT
Start: 2017-10-03 | End: 2017-10-03 | Stop reason: HOSPADM

## 2017-10-03 RX ORDER — FAMOTIDINE 10 MG/ML
20 INJECTION, SOLUTION INTRAVENOUS ONCE
Status: DISCONTINUED | OUTPATIENT
Start: 2017-10-03 | End: 2017-10-03

## 2017-10-03 RX ORDER — ONDANSETRON 2 MG/ML
4 INJECTION INTRAMUSCULAR; INTRAVENOUS EVERY 6 HOURS PRN
Status: DISCONTINUED | OUTPATIENT
Start: 2017-10-03 | End: 2017-10-03

## 2017-10-03 RX ORDER — FENTANYL CITRATE 50 UG/ML
50 INJECTION, SOLUTION INTRAMUSCULAR; INTRAVENOUS
Status: DISCONTINUED | OUTPATIENT
Start: 2017-10-03 | End: 2017-10-03 | Stop reason: HOSPADM

## 2017-10-03 RX ORDER — HYDROMORPHONE HYDROCHLORIDE 1 MG/ML
0.5 INJECTION, SOLUTION INTRAMUSCULAR; INTRAVENOUS; SUBCUTANEOUS
Status: DISCONTINUED | OUTPATIENT
Start: 2017-10-03 | End: 2017-10-04 | Stop reason: HOSPADM

## 2017-10-03 RX ORDER — OXYCODONE HCL 10 MG/1
10 TABLET, FILM COATED, EXTENDED RELEASE ORAL ONCE
Status: COMPLETED | OUTPATIENT
Start: 2017-10-03 | End: 2017-10-03

## 2017-10-03 RX ORDER — BISACODYL 5 MG/1
10 TABLET, DELAYED RELEASE ORAL DAILY PRN
Status: DISCONTINUED | OUTPATIENT
Start: 2017-10-03 | End: 2017-10-04 | Stop reason: HOSPADM

## 2017-10-03 RX ORDER — ONDANSETRON 2 MG/ML
INJECTION INTRAMUSCULAR; INTRAVENOUS AS NEEDED
Status: DISCONTINUED | OUTPATIENT
Start: 2017-10-03 | End: 2017-10-03 | Stop reason: SURG

## 2017-10-03 RX ORDER — SODIUM CHLORIDE 0.9 % (FLUSH) 0.9 %
1-10 SYRINGE (ML) INJECTION AS NEEDED
Status: DISCONTINUED | OUTPATIENT
Start: 2017-10-03 | End: 2017-10-04 | Stop reason: HOSPADM

## 2017-10-03 RX ORDER — BISACODYL 10 MG
10 SUPPOSITORY, RECTAL RECTAL DAILY PRN
Status: DISCONTINUED | OUTPATIENT
Start: 2017-10-03 | End: 2017-10-04 | Stop reason: HOSPADM

## 2017-10-03 RX ORDER — PREGABALIN 75 MG/1
75 CAPSULE ORAL ONCE
Status: COMPLETED | OUTPATIENT
Start: 2017-10-03 | End: 2017-10-03

## 2017-10-03 RX ORDER — CEFAZOLIN SODIUM 2 G/100ML
2 INJECTION, SOLUTION INTRAVENOUS EVERY 8 HOURS
Status: COMPLETED | OUTPATIENT
Start: 2017-10-03 | End: 2017-10-03

## 2017-10-03 RX ORDER — MAGNESIUM HYDROXIDE 1200 MG/15ML
LIQUID ORAL AS NEEDED
Status: DISCONTINUED | OUTPATIENT
Start: 2017-10-03 | End: 2017-10-03 | Stop reason: HOSPADM

## 2017-10-03 RX ORDER — SENNA AND DOCUSATE SODIUM 50; 8.6 MG/1; MG/1
2 TABLET, FILM COATED ORAL 2 TIMES DAILY
Status: DISCONTINUED | OUTPATIENT
Start: 2017-10-03 | End: 2017-10-04 | Stop reason: HOSPADM

## 2017-10-03 RX ORDER — ONDANSETRON 2 MG/ML
4 INJECTION INTRAMUSCULAR; INTRAVENOUS EVERY 6 HOURS PRN
Status: DISCONTINUED | OUTPATIENT
Start: 2017-10-03 | End: 2017-10-04 | Stop reason: HOSPADM

## 2017-10-03 RX ORDER — HYDRALAZINE HYDROCHLORIDE 20 MG/ML
10 INJECTION INTRAMUSCULAR; INTRAVENOUS EVERY 6 HOURS PRN
Status: DISCONTINUED | OUTPATIENT
Start: 2017-10-03 | End: 2017-10-04 | Stop reason: HOSPADM

## 2017-10-03 RX ORDER — FAMOTIDINE 20 MG/1
20 TABLET, FILM COATED ORAL ONCE
Status: COMPLETED | OUTPATIENT
Start: 2017-10-03 | End: 2017-10-03

## 2017-10-03 RX ORDER — PROMETHAZINE HYDROCHLORIDE 25 MG/1
25 SUPPOSITORY RECTAL ONCE AS NEEDED
Status: DISCONTINUED | OUTPATIENT
Start: 2017-10-03 | End: 2017-10-03 | Stop reason: HOSPADM

## 2017-10-03 RX ORDER — NALOXONE HCL 0.4 MG/ML
0.4 VIAL (ML) INJECTION
Status: DISCONTINUED | OUTPATIENT
Start: 2017-10-03 | End: 2017-10-04 | Stop reason: HOSPADM

## 2017-10-03 RX ORDER — DIPHENHYDRAMINE HCL 25 MG
25 CAPSULE ORAL EVERY 6 HOURS PRN
Status: DISCONTINUED | OUTPATIENT
Start: 2017-10-03 | End: 2017-10-04 | Stop reason: HOSPADM

## 2017-10-03 RX ORDER — DOCUSATE SODIUM 100 MG/1
100 CAPSULE, LIQUID FILLED ORAL 2 TIMES DAILY PRN
Status: DISCONTINUED | OUTPATIENT
Start: 2017-10-03 | End: 2017-10-04 | Stop reason: HOSPADM

## 2017-10-03 RX ORDER — HYDROCODONE BITARTRATE AND ACETAMINOPHEN 7.5; 325 MG/1; MG/1
1 TABLET ORAL EVERY 4 HOURS PRN
Status: DISCONTINUED | OUTPATIENT
Start: 2017-10-03 | End: 2017-10-04 | Stop reason: HOSPADM

## 2017-10-03 RX ORDER — ACETAMINOPHEN 325 MG/1
650 TABLET ORAL EVERY 4 HOURS PRN
Status: DISCONTINUED | OUTPATIENT
Start: 2017-10-03 | End: 2017-10-04 | Stop reason: HOSPADM

## 2017-10-03 RX ORDER — EPHEDRINE SULFATE 50 MG/ML
5 INJECTION, SOLUTION INTRAVENOUS ONCE AS NEEDED
Status: DISCONTINUED | OUTPATIENT
Start: 2017-10-03 | End: 2017-10-03 | Stop reason: HOSPADM

## 2017-10-03 RX ORDER — SODIUM CHLORIDE 9 MG/ML
120 INJECTION, SOLUTION INTRAVENOUS CONTINUOUS
Status: DISCONTINUED | OUTPATIENT
Start: 2017-10-03 | End: 2017-10-04 | Stop reason: HOSPADM

## 2017-10-03 RX ORDER — NALOXONE HCL 0.4 MG/ML
0.1 VIAL (ML) INJECTION
Status: DISCONTINUED | OUTPATIENT
Start: 2017-10-03 | End: 2017-10-04 | Stop reason: HOSPADM

## 2017-10-03 RX ORDER — SODIUM CHLORIDE, SODIUM LACTATE, POTASSIUM CHLORIDE, CALCIUM CHLORIDE 600; 310; 30; 20 MG/100ML; MG/100ML; MG/100ML; MG/100ML
9 INJECTION, SOLUTION INTRAVENOUS CONTINUOUS
Status: DISCONTINUED | OUTPATIENT
Start: 2017-10-03 | End: 2017-10-03

## 2017-10-03 RX ORDER — PROMETHAZINE HYDROCHLORIDE 25 MG/1
25 TABLET ORAL ONCE AS NEEDED
Status: DISCONTINUED | OUTPATIENT
Start: 2017-10-03 | End: 2017-10-03 | Stop reason: HOSPADM

## 2017-10-03 RX ORDER — MELOXICAM 7.5 MG/1
15 TABLET ORAL DAILY
Status: DISCONTINUED | OUTPATIENT
Start: 2017-10-03 | End: 2017-10-04 | Stop reason: HOSPADM

## 2017-10-03 RX ORDER — LIDOCAINE HYDROCHLORIDE 10 MG/ML
0.5 INJECTION, SOLUTION EPIDURAL; INFILTRATION; INTRACAUDAL; PERINEURAL ONCE AS NEEDED
Status: COMPLETED | OUTPATIENT
Start: 2017-10-03 | End: 2017-10-03

## 2017-10-03 RX ORDER — BUPIVACAINE HYDROCHLORIDE 5 MG/ML
INJECTION, SOLUTION EPIDURAL; INTRACAUDAL AS NEEDED
Status: DISCONTINUED | OUTPATIENT
Start: 2017-10-03 | End: 2017-10-03 | Stop reason: SURG

## 2017-10-03 RX ORDER — MORPHINE SULFATE 4 MG/ML
4 INJECTION, SOLUTION INTRAMUSCULAR; INTRAVENOUS
Status: DISCONTINUED | OUTPATIENT
Start: 2017-10-03 | End: 2017-10-04 | Stop reason: HOSPADM

## 2017-10-03 RX ADMIN — BUPIVACAINE HYDROCHLORIDE 2 ML: 5 INJECTION, SOLUTION EPIDURAL; INTRACAUDAL; PERINEURAL at 07:49

## 2017-10-03 RX ADMIN — ATORVASTATIN CALCIUM 10 MG: 10 TABLET, FILM COATED ORAL at 22:35

## 2017-10-03 RX ADMIN — PREGABALIN 75 MG: 75 CAPSULE ORAL at 06:41

## 2017-10-03 RX ADMIN — SODIUM CHLORIDE 120 ML/HR: 9 INJECTION, SOLUTION INTRAVENOUS at 11:18

## 2017-10-03 RX ADMIN — EPHEDRINE SULFATE 10 MG: 50 INJECTION INTRAMUSCULAR; INTRAVENOUS; SUBCUTANEOUS at 08:33

## 2017-10-03 RX ADMIN — MELOXICAM 15 MG: 7.5 TABLET ORAL at 12:33

## 2017-10-03 RX ADMIN — CELECOXIB 200 MG: 200 CAPSULE ORAL at 06:51

## 2017-10-03 RX ADMIN — PROPOFOL 35 MCG/KG/MIN: 10 INJECTION, EMULSION INTRAVENOUS at 07:55

## 2017-10-03 RX ADMIN — SODIUM CHLORIDE, POTASSIUM CHLORIDE, SODIUM LACTATE AND CALCIUM CHLORIDE 9 ML/HR: 600; 310; 30; 20 INJECTION, SOLUTION INTRAVENOUS at 06:29

## 2017-10-03 RX ADMIN — LIDOCAINE HYDROCHLORIDE 0.2 ML: 10 INJECTION, SOLUTION EPIDURAL; INFILTRATION; INTRACAUDAL; PERINEURAL at 06:29

## 2017-10-03 RX ADMIN — EPHEDRINE SULFATE 20 MG: 50 INJECTION INTRAMUSCULAR; INTRAVENOUS; SUBCUTANEOUS at 08:44

## 2017-10-03 RX ADMIN — SODIUM CHLORIDE 120 ML/HR: 9 INJECTION, SOLUTION INTRAVENOUS at 20:13

## 2017-10-03 RX ADMIN — CEFAZOLIN SODIUM 2 G: 2 INJECTION, SOLUTION INTRAVENOUS at 16:01

## 2017-10-03 RX ADMIN — DEXAMETHASONE SODIUM PHOSPHATE 8 MG: 4 INJECTION, SOLUTION INTRAMUSCULAR; INTRAVENOUS at 07:55

## 2017-10-03 RX ADMIN — Medication 2 TABLET: at 17:51

## 2017-10-03 RX ADMIN — MUPIROCIN: 20 OINTMENT TOPICAL at 06:41

## 2017-10-03 RX ADMIN — FENTANYL CITRATE 50 MCG: 50 INJECTION, SOLUTION INTRAMUSCULAR; INTRAVENOUS at 10:45

## 2017-10-03 RX ADMIN — Medication 2 TABLET: at 12:33

## 2017-10-03 RX ADMIN — SODIUM CHLORIDE 680 MG: 9 INJECTION, SOLUTION INTRAVENOUS at 09:08

## 2017-10-03 RX ADMIN — EPHEDRINE SULFATE 10 MG: 50 INJECTION INTRAMUSCULAR; INTRAVENOUS; SUBCUTANEOUS at 08:14

## 2017-10-03 RX ADMIN — ONDANSETRON 4 MG: 2 INJECTION INTRAMUSCULAR; INTRAVENOUS at 09:08

## 2017-10-03 RX ADMIN — HYDROCODONE BITARTRATE AND ACETAMINOPHEN 1 TABLET: 7.5; 325 TABLET ORAL at 20:10

## 2017-10-03 RX ADMIN — FAMOTIDINE 20 MG: 20 TABLET ORAL at 06:41

## 2017-10-03 RX ADMIN — CEFAZOLIN SODIUM 2 G: 2 INJECTION, SOLUTION INTRAVENOUS at 23:15

## 2017-10-03 RX ADMIN — HYDROCODONE BITARTRATE AND ACETAMINOPHEN 1 TABLET: 7.5; 325 TABLET ORAL at 14:56

## 2017-10-03 RX ADMIN — SODIUM CHLORIDE 680 MG: 9 INJECTION, SOLUTION INTRAVENOUS at 07:55

## 2017-10-03 RX ADMIN — OXYCODONE HYDROCHLORIDE 10 MG: 10 TABLET, FILM COATED, EXTENDED RELEASE ORAL at 06:40

## 2017-10-03 RX ADMIN — ACETAMINOPHEN 1000 MG: 500 TABLET ORAL at 06:40

## 2017-10-03 RX ADMIN — EPHEDRINE SULFATE 10 MG: 50 INJECTION INTRAMUSCULAR; INTRAVENOUS; SUBCUTANEOUS at 08:10

## 2017-10-03 RX ADMIN — CEFAZOLIN SODIUM 2 G: 2 INJECTION, SOLUTION INTRAVENOUS at 07:31

## 2017-10-03 NOTE — THERAPY EVALUATION
Acute Care - Physical Therapy Initial Evaluation  [facility]     Patient Name: Jacques Yeboah  : 1945  MRN: [id number]  Today's Date: 10/3/2017   Onset of Illness/Injury or Date of Surgery Date: 10/03/17  Date of Referral to PT: 10/03/17  Referring Physician: MD Carroll      Admit Date: 10/3/2017     Visit Dx:    ICD-10-CM ICD-9-CM   1. Impaired functional mobility, balance, gait, and endurance Z74.09 V49.89     Patient Active Problem List   Diagnosis   • Hyperlipidemia   • Arthritis of right hip   • Status post total replacement of right hip     Past Medical History:   Diagnosis Date   • Arthritis    • H/O bone density study    • Hip pain    • History of colonoscopy    • History of mammogram    • Impacted cerumen of right ear     Ears needs rinsed out, feels clogged two days.    • Inflammatory arthritis    • Lumbago    • Lumbar degenerative disc disease    • Metatarsalgia, left foot    • Osteoarthritis of knees, bilateral    • Pelvic fracture    • Primary osteoarthritis of both hips    • Sciatica    • Thoracic spondylosis    • Wears glasses      Past Surgical History:   Procedure Laterality Date   • COLONOSCOPY     • EYE SURGERY     • FRACTURE SURGERY      left wrist; pelvis   • TONSILLECTOMY     • TUBAL ABDOMINAL LIGATION            PT ASSESSMENT (last 72 hours)      PT Evaluation       10/03/17 1400 10/03/17 1334    Rehab Evaluation    Document Type  evaluation  -    Subjective Information  agree to therapy;no complaints  -    Patient Effort, Rehab Treatment  excellent  -    Symptoms Noted During/After Treatment  increased pain  -    Symptoms Noted Comment  RN notified  -    General Information    Patient Profile Review  yes  -MJ    Onset of Illness/Injury or Date of Surgery Date  10/03/17  -    Referring Physician  MD Carroll  -    General Observations  Pt supine in bed, IV, SCDs, ice to R hip  -    Pertinent History Of Current Problem  Pt presents for surgical management of R hip pain  and dysfunction that failed to improve with conservative management  -MJ    Precautions/Limitations  fall precautions;hip precautions- right  -MJ    Prior Level of Function  min assist:;all household mobility;community mobility;gait;transfer;bed mobility   Pain increased mobility  -MJ    Equipment Currently Used at Home  walker, rolling;grab bar;shower chair;commode;raised toilet  -MJ    Plans/Goals Discussed With  patient;agreed upon  -MJ    Risks Reviewed  patient:;LOB;increased discomfort  -MJ    Benefits Reviewed  patient:;improve function;increase independence;increase strength;increase balance;decrease pain  -MJ    Barriers to Rehab  none identified  -MJ    Living Environment    Lives With  spouse  -MJ    Living Arrangements  house  -MJ    Home Accessibility  stairs to enter home   walk-in shower  -MJ    Number of Stairs to Enter Home  1  -MJ    Clinical Impression    Date of Referral to PT  10/03/17  -    PT Diagnosis  s/p elective R ESTHER  -    Criteria for Skilled Therapeutic Interventions Met  yes;treatment indicated  -MJ    Pain Assessment    Pain Assessment  0-10  -MJ    Pain Score  0  -MJ    Post Pain Score  2  -MJ    Pain Type  Acute pain  -MJ    Pain Location  Hip  -MJ    Pain Orientation  Right  -MJ    Pain Intervention(s)  Repositioned;Ambulation/increased activity  -MJ    Cognitive Assessment/Intervention    Current Cognitive/Communication Assessment  functional  -MJ    Orientation Status  oriented x 4  -MJ    Follows Commands/Answers Questions  100% of the time;able to follow multi-step instructions;needs cueing  -MJ    Personal Safety  WNL/WFL  -MJ    ROM (Range of Motion)    General ROM  lower extremity range of motion deficits identified  -MJ    General LE Assessment    ROM Detail  R hip AROM impaired 25%  -MJ    MMT (Manual Muscle Testing)    General MMT Assessment  lower extremity strength deficits identified  -MJ    Lower Extremity    Lower Ext Manual Muscle Testing Detail  R LE 4-/5; L LE  4+/5  -MJ    Mobility Assessment/Training    Extremity Weight-Bearing Status  right lower extremity  -MJ    Right Lower Extremity Weight-Bearing  weight-bearing as tolerated  -MJ    Bed Mobility, Assessment/Treatment    Bed Mobility, Assistive Device  bed rails;head of bed elevated  -MJ    Bed Mob, Supine to Sit, Gregory  contact guard assist;verbal cues required  -MJ    Bed Mob, Sit to Supine, Gregory  not tested   Pt UIC  -MJ    Bed Mobility, Safety Issues  decreased use of legs for bridging/pushing  -MJ    Bed Mobility, Impairments  ROM decreased;strength decreased;pain  -MJ    Bed Mobility, Comment  Verbal cues to move LEs off EOB and to use UEs to push trunk to sitting. Increased time and effort to perform  -MJ    Transfer Assessment/Treatment    Transfers, Sit-Stand Gregory  contact guard assist;2 person assist required;verbal cues required  -MJ    Transfers, Stand-Sit Gregory  contact guard assist;2 person assist required;verbal cues required  -MJ    Transfers, Sit-Stand-Sit, Assist Device  rolling walker  -MJ    Toilet Transfer, Gregory  contact guard assist;2 person assist required;verbal cues required  -MJ    Toilet Transfer, Assistive Device  bedside commode without drop arms;rolling walker  -MJ    Transfer, Safety Issues  sequencing ability decreased;step length decreased;weight-shifting ability decreased  -MJ    Transfer, Impairments  ROM decreased;strength decreased;pain  -MJ    Transfer, Comment  Verbal cues to push up from bed with UEs to stand and to reach back for chair to lower into sitting. Cues to step R LE out prior to t/f. Assisted pt to BSC, pt independent for hygiene after toileting  -MJ    Gait Assessment/Treatment    Gait, Gregory Level  contact guard assist;2 person assist required;verbal cues required  -MJ    Gait, Assistive Device  rolling walker  -MJ    Gait, Distance (Feet)  130  -MJ    Gait, Gait Pattern Analysis  swing-to gait  -MJ    Gait, Gait  Deviations  right:;antalgic;dionisio decreased;step length decreased;weight-shifting ability decreased;decreased heel strike  -    Gait, Safety Issues  sequencing ability decreased;step length decreased;weight-shifting ability decreased  -MJ    Gait, Impairments  ROM decreased;strength decreased;pain  -MJ    Gait, Comment  Pt initially demo step to gait pattern, moments of progression to step through but regressed with fatigue. Cues to stay in middle of RW and to keep pushing along continuously. Cues to  feet during turn. Gait limited by pain and fatigue  -    Therapy Exercises    Exercise Protocols  total hip  -MJ    Total Hip Exercises  right:;10 reps;ankle pumps/circles;quad set;glut set  -    Sensory Assessment/Intervention    Light Touch  --   denies numbness/tingling; can actively DF both ankles  -MJ    LLE Light Touch mild impairment  -CB     RLE Light Touch mild impairment  -CB     Positioning and Restraints    Pre-Treatment Position  in bed  -    Post Treatment Position  chair  -    In Chair  notified nsg;reclined;call light within reach;encouraged to call for assist;with family/caregiver  -      10/03/17 1200 10/03/17 1115    Sensory Assessment/Intervention    Light Touch  LLE;RLE  -CB    LLE Light Touch mild impairment  -CB mild impairment  -CB    RLE Light Touch mild impairment  -CB moderate impairment  -CB      10/03/17 0625       General Information    Equipment Currently Used at Home grab bar  -NM     Living Environment    Lives With spouse  -NM     Living Arrangements house  -NM     Transportation Available family or friend will provide  -NM       User Key  (r) = Recorded By, (t) = Taken By, (c) = Cosigned By    Initials Name Provider Type    NM Nicole Melvin, RN Registered Nurse    SITA Sauer, MICHEAL Physical Therapist    CB Marva Butler, SILAS Registered Nurse          Physical Therapy Education     Title: PT OT SLP Therapies (Active)     Topic: Physical Therapy (Active)      Point: Mobility training (Done)    Learning Progress Summary    Learner Readiness Method Response Comment Documented by Status   Patient Acceptance MARIO ROMERO,NR Reviewed hip precautions, WB status  10/03/17 1420 Done               Point: Body mechanics (Done)    Learning Progress Summary    Learner Readiness Method Response Comment Documented by Status   Patient Acceptance MARIO ROMERO,NR Reviewed hip precautions, WB status  10/03/17 1420 Done               Point: Precautions (Done)    Learning Progress Summary    Learner Readiness Method Response Comment Documented by Status   Patient Acceptance MARIO ROMERO,NR Reviewed hip precautions, WB status  10/03/17 1420 Done                      User Key     Initials Effective Dates Name Provider Type Discipline     03/14/16 -  Daron Sauer, PT Physical Therapist PT                PT Recommendation and Plan  Anticipated Discharge Disposition: home with assist, home with outpatient services  Planned Therapy Interventions: balance training, bed mobility training, gait training, home exercise program, patient/family education, stair training, strengthening, transfer training  PT Frequency: 2 times/day  Plan of Care Review  Plan Of Care Reviewed With: patient  Outcome Summary/Follow up Plan: Pt ambulated 130 feet with CGAx2 and RW. PT will follow to increase strength and progress functional mobility with hip precautions. Plan is home with outpatient  services at discharge          IP PT Goals       10/03/17 1421          Bed Mobility PT LTG    Bed Mobility PT LTG, Date Established 10/03/17  -MJ      Bed Mobility PT LTG, Time to Achieve 5 days  -MJ      Bed Mobility PT LTG, Activity Type supine to sit/sit to supine  -MJ      Bed Mobility PT LTG, [place] Level conditional independence  -MJ      Bed Mobility PT LTG, Date Goal Reviewed 10/03/17  -MJ      Bed Mobility PT LTG, Outcome goal ongoing  -MJ      Transfer Training PT LTG    Transfer Training PT LTG, Date Established 10/03/17   -MJ      Transfer Training PT LTG, Time to Achieve 5 days  -MJ      Transfer Training PT LTG, Activity Type sit to stand/stand to sit  -MJ      Transfer Training PT LTG, [place] Level conditional independence  -MJ      Transfer Training PT LTG, Assist Device walker, rolling  -MJ      Transfer Training PT  LTG, Date Goal Reviewed 10/03/17  -MJ      Transfer Training PT LTG, Outcome goal ongoing  -MJ      Gait Training PT LTG    Gait Training Goal PT LTG, Date Established 10/03/17  -MJ      Gait Training Goal PT LTG, Time to Achieve 5 days  -MJ      Gait Training Goal PT LTG, [place] Level conditional independence  -MJ      Gait Training Goal PT LTG, Assist Device walker, rolling  -MJ      Gait Training Goal PT LTG, Distance to Achieve 500 feet  -MJ      Gait Training Goal PT LTG, Date Goal Reviewed 10/03/17  -MJ      Gait Training Goal PT LTG, Outcome goal ongoing  -MJ      Stair Training PT LTG    Stair Training Goal PT LTG, Date Established 10/03/17  -MJ      Stair Training Goal PT LTG, Time to Achieve 5 days  -MJ      Stair Training Goal PT LTG, Number of Steps 1  -MJ      Stair Training Goal PT LTG, [place] Level contact guard assist  -MJ      Stair Training Goal PT LTG, Assist Device walker, rolling   backward  -MJ      Stair Training Goal PT LTG, Date Goal Reviewed 10/03/17  -MJ      Stair Training Goal PT LTG, Outcome goal ongoing  -MJ        User Key  (r) = Recorded By, (t) = Taken By, (c) = Cosigned By    Initials Name Provider Type    SITA Sauer, PT Physical Therapist                Outcome Measures       10/03/17 7728          How much help from another person do you currently need...    Turning from your back to your side while in flat bed without using bedrails? 3  -MJ      Moving from lying on back to sitting on the side of a flat bed without bedrails? 3  -MJ      Moving to and from a bed to a chair (including a wheelchair)? 3  -MJ      Standing up from a chair using your arms (e.g.,  wheelchair, bedside chair)? 3  -MJ      Climbing 3-5 steps with a railing? 3  -MJ      To walk in hospital room? 3  -MJ      AM-PAC 6 Clicks Score 18  -MJ      Functional Assessment    Outcome Measure Options AM-PAC 6 Clicks Basic Mobility (PT)  -        User Key  (r) = Recorded By, (t) = Taken By, (c) = Cosigned By    Initials Name Provider Type    SITA Sauer PT Physical Therapist           Time Calculation:         PT Charges       10/03/17 1425          Time Calculation    Start Time 1334  -MJ      PT Received On 10/03/17  -      PT Goal Re-Cert Due Date 10/13/17  -      Time Calculation- PT    Total Timed Code Minutes- PT 12 minute(s)  -        User Key  (r) = Recorded By, (t) = Taken By, (c) = Cosigned By    Initials Name Provider Type    SITA Sauer PT Physical Therapist          Therapy Charges for Today     Code Description Service Date Service Provider Modifiers Qty    17785734890 HC PT EVAL MOD COMPLEXITY 3 10/3/2017 Daron Sauer, PT GP 1    25827623850 HC GAIT TRAINING EA 15 MIN 10/3/2017 Daron Sauer, PT GP 1    62139105001 HC PT THER SUPP EA 15 MIN 10/3/2017 Daron Sauer, PT GP 3          PT G-Codes  Outcome Measure Options: AM-PAC 6 Clicks Basic Mobility (PT)      Daron Sauer PT  10/3/2017

## 2017-10-03 NOTE — OP NOTE
DATE OF PROCEDURE:  10/03/17    SURGEON: Prem Hodges MD    ASSISTANT: Naima Braden PA-C     PREOPERATIVE DIAGNOSIS: Right hip arthritis    POSTOPERATIVE DIAGNOSIS: Right hip arthritis    PROCEDURE PERFORMED: Right total hip arthroplasty with DePuy components (# 50mm Press-Fit [place] cup with + 4 neutral polyethylene liner with # 5 hi Offset Press-Fit Serafina Stem with + 1 x 32mm ceramic head).     SPECIMENS: None    ANESTHESIA:  Spinal    STAFF:  Circulator: Jayson Carrasco RN  Scrub Person: Garett Samuels; Enrike Jackson  Vendor Representative: Jonathon Celis  Nursing Assistant: Cristobal Melton  Assistant: Naima Braden PA-C    ESTIMATED BLOOD LOSS: 150cc    COMPLICATIONS: None    PREOPERATIVE ANTIBIOTICS: Ancef 2G    INDICATIONS: The patient is a 72 year old female with a history of debilitating right hip pain secondary to osteoarthritis, that failed to improve in spite of conservative treatment. The patient opted for a left total hip arthroplasty at this time and consented for the procedure. Please see my office notes for details with regard to preoperative counseling and operative rationale.     DESCRIPTION OF PROCEDURE: The patient was positively identified in the preoperative holding area, brought to the operative suite, and placed in a supine position. After adequate spinal anesthetic had been achieved, the patient was placed in lateral decubitus position with the right side up. The patient was well padded on the pegboard table. After sterile prep and drape of the right hip and lower extremity, a timeout procedure was performed to confirm the operative site, as well as the other parameters. A skin incision was made on the lateral aspect of the hip for a modified direct lateral approach. Following a sharp skin incision, dissection was carried down to the level of the fascia which was incised in line with its fibers. The underlying interval between the anterior one third and posterior two thirds of  the gluteus medius was then entered after the bursa had been reflected posteriorly. This was elevated as a full thickness flap and preserved for later repair. The hip capsule was then incised in a T-shaped fashion and the head dislocated from the acetabulum. Description of femoral head: Eburnation, with osteophyte formation. The head was then resected with the aid of an oscillating saw blade. Description of acetabulum: Eburnation, with thickening of the labrum around the periphery. The acetabulum was sequentially reamed up to 49 to accommodate a 50mm Press-Fit [place] Cup, which had excellent press-fit characteristics and did not require any screw fixation. A trial +4 neutral polyethylene liner was placed. Attention was then redirected towards the femoral aspect. Sequential reaming and broaching was performed on the femur to accommodate a # 5 broach with a + 1 x 32mm head.  Trial reduction was performed, with no dislocation throughout the full arc of motion, with appropriate limb lengths.  The hip was again dislocated, and the final +4 neutral polyethylene liner was placed and the final # 5  hi offset Press-Fit [place] Stem was placed, followed by + 1 x 32mm ceramic head, and the same reduction characteristics were noted as with the trial with no dislocation throughout the full arc of motion, and appropriate limb lengths.  Therefore, the hip was copiously irrigated and attention was directed towards closure. The hip capsule was closed with #1 Vicryl in an interrupted figure-of-eight fashion, followed by closure of the gluteus medius and vastus lateralis sleeve as a full thickness sleeve repair with #1 Vicryl in an interrupted figure-of-eight fashion, followed by closure of the fascia julissa with #1 Vicryl in an interrupted figure-of-eight fashion in 3 strategic locations both proximally, distally and in the central portion, followed by oversewing this from distal to proximal with a #1 StrataFix symmetric, which nicely  sealed that layer, followed by closure of the deep fascial layer with #1 Vicryl in a buried interrupted fashion, followed by closure of the subcutaneous layer with 2-0 Vicryl and the skin with 3-0 StrataFix in a running subcuticular fashion. Prineo wound closure dressing was applied followed by a sterile dressing with 4 x 4s secured with micropore tape. He tolerated the procedure well and was brought to the recovery room in good condition.     POSTOPERATIVE PLAN:  1. The patient will begin early range of motion and weight-bearing per the post total hip arthroplasty protocol.   2. I anticipate brief hospitalization for initial rehabilitation and pain control followed by continued rehabilitation in a home health setting.   3. Postoperative medical management with Dr. Quinones.  4. Postoperative DVT prophylaxis with aspirin unless there is a contraindication.       Prem Hodges MD  10/03/17

## 2017-10-03 NOTE — PLAN OF CARE
Problem: Patient Care Overview (Adult)  Goal: Plan of Care Review  Outcome: Ongoing (interventions implemented as appropriate)    10/03/17 1421   Coping/Psychosocial Response Interventions   Plan Of Care Reviewed With patient   Outcome Evaluation   Outcome Summary/Follow up Plan Pt ambulated 130 feet with CGAx2 and RW. PT will follow to increase strength and progress functional mobility with hip precautions. Plan is home with outpatient services at discharge         Problem: Hip Replacement, Total (Adult)  Goal: Signs and Symptoms of Listed Potential Problems Will be Absent or Manageable (Hip Replacement, Total)  Outcome: Ongoing (interventions implemented as appropriate)    10/03/17 1421   Hip Replacement, Total   Problems Assessed (Total Hip Replacement) pain;functional decline/self care deficit   Problems Present (Total Hip Replacement) pain;functional decline/self care deficit         Problem: Inpatient Physical Therapy  Goal: Bed Mobility Goal LTG- PT  Outcome: Ongoing (interventions implemented as appropriate)    10/03/17 1421   Bed Mobility PT LTG   Bed Mobility PT LTG, Date Established 10/03/17   Bed Mobility PT LTG, Time to Achieve 5 days   Bed Mobility PT LTG, Activity Type supine to sit/sit to supine   Bed Mobility PT LTG, [place] Level conditional independence   Bed Mobility PT LTG, Date Goal Reviewed 10/03/17   Bed Mobility PT LTG, Outcome goal ongoing       Goal: Transfer Training Goal 1 LTG- PT  Outcome: Ongoing (interventions implemented as appropriate)    10/03/17 1421   Transfer Training PT LTG   Transfer Training PT LTG, Date Established 10/03/17   Transfer Training PT LTG, Time to Achieve 5 days   Transfer Training PT LTG, Activity Type sit to stand/stand to sit   Transfer Training PT LTG, [place] Level conditional independence   Transfer Training PT LTG, Assist Device walker, rolling   Transfer Training PT LTG, Date Goal Reviewed 10/03/17   Transfer Training PT LTG, Outcome goal  ongoing       Goal: Gait Training Goal LTG- PT  Outcome: Ongoing (interventions implemented as appropriate)    10/03/17 1421   Gait Training PT LTG   Gait Training Goal PT LTG, Date Established 10/03/17   Gait Training Goal PT LTG, Time to Achieve 5 days   Gait Training Goal PT LTG, [place] Level conditional independence   Gait Training Goal PT LTG, Assist Device walker, rolling   Gait Training Goal PT LTG, Distance to Achieve 500 feet   Gait Training Goal PT LTG, Date Goal Reviewed 10/03/17   Gait Training Goal PT LTG, Outcome goal ongoing       Goal: Stair Training Goal LTG- PT  Outcome: Ongoing (interventions implemented as appropriate)    10/03/17 1421   Stair Training PT LTG   Stair Training Goal PT LTG, Date Established 10/03/17   Stair Training Goal PT LTG, Time to Achieve 5 days   Stair Training Goal PT LTG, Number of Steps 1   Stair Training Goal PT LTG, [place] Level contact guard assist   Stair Training Goal PT LTG, Assist Device walker, rolling  (backward)   Stair Training Goal PT LTG, Date Goal Reviewed 10/03/17   Stair Training Goal PT LTG, Outcome goal ongoing

## 2017-10-03 NOTE — ANESTHESIA PROCEDURE NOTES
Spinal Block    Patient location during procedure: OR  Indication:at surgeon's request  Performed By  CRNA: URIEL JEAN BAPTISTE  Preanesthetic Checklist  Completed: patient identified, site marked, surgical consent, pre-op evaluation, timeout performed, IV checked, risks and benefits discussed and monitors and equipment checked  Spinal Block Prep:  Patient Position:sitting  Sterile Tech:cap, gloves and sterile barriers  Prep:Chloraprep  Patient Monitoring:EKG, continuous pulse oximetry and blood pressure monitoring  Spinal Block Procedure  Approach:midline  Guidance:landmark technique and palpation technique  Location:L4-L5  Needle Type:Chauncey  Needle Gauge:25 G  Placement of Spinal needle event:cerebrospinal fluid aspirated  Paresthesia: no  Fluid Appearance:clear  Post Assessment  Patient Tolerance:patient tolerated the procedure well with no apparent complications  Complications no  Additional Notes  Procedure:  Pt assisted to sitting position, with legs in position of comfort over side of bed.  Pt. instructed in optimal spine presentation, the spine was prepped/ Draped and the skin at insertion site was anesthetized with 1% Lidocaine 2 ml.  The spinal needle was then advanced until CSF flow was obtained and LA was injected: Difficult spinal placement, X3 attempts at 2 different level. I was able to easily palpate the spinous processes but had difficulty advancing the needle thru the spaces.  Patient tolerated well.       Marcaine 0.5% PSF injected 10 Mg.

## 2017-10-03 NOTE — H&P
Patient Care Team:      Chief complaint - Bilateral hip pain    Subjective:    Patient is a 72 y.o.female presents with a history of pain in both hip joints made worse by standing or walking.  Review of Systems:  General ROS: negative  Cardiovascular ROS: no chest pain or dyspnea on exertion  Respiratory ROS: no cough, shortness of breath, or wheezing      Allergies: No Known Allergies       Latex: denies  Contrast Dye: denies    Home Meds    Prescriptions Prior to Admission   Medication Sig Dispense Refill Last Dose   • calcium carbonate (OS-SKYLER) 600 MG tablet Take 600 mg by mouth 2 (Two) Times a Day With Meals.   10/2/2017 at 1900   • simvastatin (ZOCOR) 20 MG tablet Take 20 mg by mouth Every Night.   10/2/2017 at 1900   • Naproxen Sodium (ALEVE) 220 MG capsule Take  by mouth Take As Directed.   9/30/2017     PMH:   Past Medical History:   Diagnosis Date   • Arthritis    • H/O bone density study    • Hip pain    • History of colonoscopy    • History of mammogram    • Impacted cerumen of right ear     Ears needs rinsed out, feels clogged two days.    • Inflammatory arthritis    • Lumbago    • Lumbar degenerative disc disease    • Metatarsalgia, left foot    • Osteoarthritis of knees, bilateral    • Pelvic fracture    • Primary osteoarthritis of both hips    • Sciatica    • Thoracic spondylosis    • Wears glasses      PSH:    Past Surgical History:   Procedure Laterality Date   • COLONOSCOPY     • EYE SURGERY     • FRACTURE SURGERY      left wrist; pelvis   • TONSILLECTOMY     • TUBAL ABDOMINAL LIGATION       Immunization History: pneumo - ~2 years ago   Flu - 2016  Tetanus - <10 years  Social History:   Tobacco - denies   Alcohol - denies       Physical Exam:/85 (BP Location: Right arm, Patient Position: Lying)  Pulse 84  Temp 97.5 °F (36.4 °C) (Temporal Artery )   Wt 147 lb (66.7 kg)  SpO2 97%  BMI 24.77 kg/m2      General Appearance:    Alert, cooperative, no distress, appears stated age   Head:     Normocephalic, without obvious abnormality, atraumatic   Lungs:     Clear to auscultation bilaterally, respirations unlabored    Heart:   Regular rate and rhythm, S1 and S2 normal, no murmur, rub    or gallop    Abdomen:    Soft without tenderness   Breast Exam:    deferred   Genitalia:    deferred   Extremities:   Extremities normal, atraumatic, no cyanosis or edema   Skin:   Skin color, texture, turgor normal, no rashes or lesions   Neurologic:   Grossly intact     Cancer Patient: __ yes _x_no __unknown; If yes, clinical stage T:__ N:__M:__, stage group    Impression: Osteoarthritis of bilateral hips    Plan: Total joint arthroplasty of right hip    PANCHO Owens 10/3/2017 6:52 AM      Agree with above - plan for right ESTHER

## 2017-10-03 NOTE — H&P
Patient Name: Jacques Yeboah  MRN: [id number]  : 1945  DOS: 10/3/2017    Attending: Prem Hodges MD    Primary Care Provider: Regan Adams MD      Chief complaint:  Right hip pain    Subjective   Patient is a 72 y.o. female presented for right total hip arthroplasty by Dr. Hodges under spinal anesthesia. She tolerated surgery well and is admitted for further medical management. Her hip has been severely painful for the last six months. She denies use of assistive device for ambulation.    When seen postop she is doing well. Her pain is well controlled. She reports some dizziness when sitting up. She denies nausea, shortness of breath or chest pain. No hx of DVT or PE.    ( Above noted and agree.  I saw Ms. Yeboah in her room afterwards, she continues to do well, she has already ambulated with physical therapy at distance of 130 feet with contact-guard assist of 2 and a rolling walker.  She had mild dizziness.  Her pain is under control.  No malaise of nausea or vomiting or shortness breath.  Currently resting comfortably in bed.)wy       Allergies:  No Known Allergies    Meds:  Prescriptions Prior to Admission   Medication Sig Dispense Refill Last Dose   • calcium carbonate (OS-SKYLER) 600 MG tablet Take 600 mg by mouth 2 (Two) Times a Day With Meals.   10/2/2017 at 1900   • simvastatin (ZOCOR) 20 MG tablet Take 20 mg by mouth Every Night.   10/2/2017 at 1900   • Naproxen Sodium (ALEVE) 220 MG capsule Take  by mouth Take As Directed.   2017       History:   Past Medical History:   Diagnosis Date   • Arthritis    • H/O bone density study    • Hip pain    • History of colonoscopy    • History of mammogram    • Impacted cerumen of right ear     Ears needs rinsed out, feels clogged two days.    • Inflammatory arthritis    • Lumbago    • Lumbar degenerative disc disease    • Metatarsalgia, left foot    • Osteoarthritis of knees, bilateral    • Pelvic fracture    • Primary osteoarthritis of  "both hips    • Sciatica    • Thoracic spondylosis    • Wears glasses      Past Surgical History:   Procedure Laterality Date   • COLONOSCOPY     • EYE SURGERY     • FRACTURE SURGERY      left wrist; pelvis   • TONSILLECTOMY     • TUBAL ABDOMINAL LIGATION       Family History   Problem Relation Age of Onset   • Heart failure Mother      CHF   • Osteoarthritis Mother    • Heart attack Father      Acute MI   • Stroke Father      Stroke syndrome     Social History   Substance Use Topics   • Smoking status: Never Smoker   • Smokeless tobacco: Never Used   • Alcohol use No   With no children.  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    Review of Systems  Pertinent items are noted in HPI, all other systems reviewed and negative    Vital Signs  /70  Pulse 68  Temp 97.7 °F (36.5 °C)  Resp 16  Ht 64.6\" (164.1 cm)  Wt 147 lb (66.7 kg)  SpO2 99%  BMI 24.77 kg/m2    Physical Exam:    General Appearance:    Alert, cooperative, in no acute distress   Head:    Normocephalic, without obvious abnormality, atraumatic   Eyes:            Lids and lashes normal, conjunctivae and sclerae normal, no   icterus, no pallor, corneas clear    Ears:    Ears appear intact with no abnormalities noted   Neck:   No adenopathy, supple, trachea midline, no thyromegaly    Lungs:     Clear to auscultation,respirations regular, even and                   unlabored    Heart:    Regular rhythm and normal rate, normal S1 and S2, no            murmur, no gallop, no rub, no click   Abdomen:     Normal bowel sounds, no masses, no organomegaly, soft        non-tender, non-distended, no guarding, no rebound                 tenderness   Genitalia:    Deferred   Extremities:   Right hip bulky surgical dressing clean dry intact.     Pulses:   Pulses palpable and equal bilaterally   Skin:   No bleeding, bruising or rash   Neurologic:   Cranial nerves 2 - 12 grossly intact, sensation intact. Flexion and dorsiflexion intact bilateral feet.        I " reviewed the patient's new clinical results.     Results for SANYA MUSE (MRN [id number]) as of 10/3/2017 10:06   Ref. Range 9/21/2017 15:50   Glucose Latest Ref Range: 70 - 100 mg/dL 112 (H)   Sodium Latest Ref Range: 132 - 146 mmol/L 139   Potassium Latest Ref Range: 3.5 - 5.5 mmol/L 3.9   CO2 Latest Ref Range: 20.0 - 31.0 mmol/L 30.0   Chloride Latest Ref Range: 99 - 109 mmol/L 105   Anion Gap Latest Ref Range: 3.0 - 11.0 mmol/L 4.0   Creatinine Latest Ref Range: 0.60 - 1.30 mg/dL 0.70   BUN Latest Ref Range: 9 - 23 mg/dL 24 (H)   BUN/Creatinine Ratio Latest Ref Range: 7.0 - 25.0  34.3 (H)   Calcium Latest Ref Range: 8.7 - 10.4 mg/dL 9.3   eGFR Non African Amer Latest Ref Range: >60 mL/min/1.73 82   Hemoglobin A1C Latest Ref Range: 4.80 - 5.60 % 5.40   C-Reactive Protein Latest Ref Range: 0.00 - 1.00 mg/dL 0.07   Protime Latest Ref Range: 9.6 - 11.5 Seconds 10.6   INR Unknown 0.97   aPTT Latest Ref Range: 24.0 - 31.0 seconds 26.8   WBC Latest Ref Range: 3.50 - 10.80 10*3/mm3 4.92   RBC Latest Ref Range: 3.89 - 5.14 10*6/mm3 4.10   Hemoglobin Latest Ref Range: 11.5 - 15.5 g/dL 12.8   Hematocrit Latest Ref Range: 34.5 - 44.0 % 38.8   RDW Latest Ref Range: 11.3 - 14.5 % 13.2   MCV Latest Ref Range: 80.0 - 99.0 fL 94.6   MCH Latest Ref Range: 27.0 - 31.0 pg 31.2 (H)   MCHC Latest Ref Range: 32.0 - 36.0 g/dL 33.0   MPV Latest Ref Range: 6.0 - 12.0 fL 9.4   Platelets Latest Ref Range: 150 - 450 10*3/mm3 236     Assessment and Plan:   Principal Problem:    Status post total replacement of right hip  Active Problems:    Hyperlipidemia    Arthritis of right hip      Plan  1. PT/OT- early ambulation post op  2. Pain control-prns   3. IS-encourage  4. DVT proph- mechs/ASA  5. Bowel regimen  6. Resume home medications as appropriate  7. Monitor post-op labs  8. DC planning for home    HLD  - Continue home statin    Seen and examined by me. Agree with above. Discussed with patient and  ( they are celebrating  their 52rd anniversary today) abdi Quinones MD  10/03/17  7:00 PM

## 2017-10-03 NOTE — ANESTHESIA PREPROCEDURE EVALUATION
Anesthesia Evaluation     Patient summary reviewed and Nursing notes reviewed   NPO Solid Status: > 8 hours  NPO Liquid Status: > 8 hours     Airway   Mallampati: I  TM distance: >3 FB  Neck ROM: full  no difficulty expected  Dental      Pulmonary    Cardiovascular     ECG reviewed    (+) hyperlipidemia    ROS comment: EKG Normal sinus rhythm with sinus arrhythmia  Nonspecific T wave abnormality    Neuro/Psych  GI/Hepatic/Renal/Endo      Musculoskeletal     Abdominal    Substance History      OB/GYN          Other   (+) arthritis                                     Anesthesia Plan    ASA 2     spinal and MAC   (SAblock , Propofol infusion,  Opiate sparing  )  intravenous induction   Anesthetic plan and risks discussed with patient.    Plan discussed with CRNA.

## 2017-10-03 NOTE — ANESTHESIA POSTPROCEDURE EVALUATION
Patient: Jacques Yeboah    Procedure Summary     Date Anesthesia Start Anesthesia Stop Room / Location    10/03/17 0731 0939  [facility] / [facility]       Procedure Diagnosis Surgeon Provider    RIGHT TOTAL HIP ARTHROPLASTY  (Right Hip) No diagnosis on file. MD Lavon Hope MD          Anesthesia Type: spinal, MAC  Last vitals  BP     118/63   Temp 98.3   Pulse 67      Resp 16       SpO2 99         Post Anesthesia Care and Evaluation    Patient location during evaluation: PACU  Patient participation: complete - patient participated  Level of consciousness: awake and alert  Pain score: 0  Pain management: adequate  Airway patency: patent  Anesthetic complications: No anesthetic complications  PONV Status: none  Cardiovascular status: hemodynamically stable and acceptable  Respiratory status: nonlabored ventilation, acceptable and nasal cannula  Hydration status: acceptable

## 2017-10-04 VITALS
OXYGEN SATURATION: 97 % | BODY MASS INDEX: 24.49 KG/M2 | HEIGHT: 65 IN | TEMPERATURE: 97.2 F | WEIGHT: 147 LBS | HEART RATE: 73 BPM | RESPIRATION RATE: 16 BRPM | DIASTOLIC BLOOD PRESSURE: 55 MMHG | SYSTOLIC BLOOD PRESSURE: 116 MMHG

## 2017-10-04 PROBLEM — D62 ACUTE BLOOD LOSS ANEMIA: Status: ACTIVE | Noted: 2017-10-04

## 2017-10-04 LAB
ANION GAP SERPL CALCULATED.3IONS-SCNC: 2 MMOL/L (ref 3–11)
BUN BLD-MCNC: 13 MG/DL (ref 9–23)
BUN/CREAT SERPL: 21.7 (ref 7–25)
CALCIUM SPEC-SCNC: 8.7 MG/DL (ref 8.7–10.4)
CHLORIDE SERPL-SCNC: 106 MMOL/L (ref 99–109)
CO2 SERPL-SCNC: 29 MMOL/L (ref 20–31)
CREAT BLD-MCNC: 0.6 MG/DL (ref 0.6–1.3)
DEPRECATED RDW RBC AUTO: 47.9 FL (ref 37–54)
ERYTHROCYTE [DISTWIDTH] IN BLOOD BY AUTOMATED COUNT: 13.6 % (ref 11.3–14.5)
GFR SERPL CREATININE-BSD FRML MDRD: 98 ML/MIN/1.73
GLUCOSE BLD-MCNC: 105 MG/DL (ref 70–100)
HCT VFR BLD AUTO: 31.6 % (ref 34.5–44)
HGB BLD-MCNC: 10.3 G/DL (ref 11.5–15.5)
MCH RBC QN AUTO: 31.1 PG (ref 27–31)
MCHC RBC AUTO-ENTMCNC: 32.6 G/DL (ref 32–36)
MCV RBC AUTO: 95.5 FL (ref 80–99)
PLATELET # BLD AUTO: 174 10*3/MM3 (ref 150–450)
PMV BLD AUTO: 10 FL (ref 6–12)
POTASSIUM BLD-SCNC: 3.9 MMOL/L (ref 3.5–5.5)
RBC # BLD AUTO: 3.31 10*6/MM3 (ref 3.89–5.14)
SODIUM BLD-SCNC: 137 MMOL/L (ref 132–146)
WBC NRBC COR # BLD: 6.41 10*3/MM3 (ref 3.5–10.8)

## 2017-10-04 PROCEDURE — 97530 THERAPEUTIC ACTIVITIES: CPT

## 2017-10-04 PROCEDURE — 97116 GAIT TRAINING THERAPY: CPT

## 2017-10-04 PROCEDURE — 80048 BASIC METABOLIC PNL TOTAL CA: CPT | Performed by: NURSE PRACTITIONER

## 2017-10-04 PROCEDURE — 85027 COMPLETE CBC AUTOMATED: CPT | Performed by: ORTHOPAEDIC SURGERY

## 2017-10-04 PROCEDURE — 97110 THERAPEUTIC EXERCISES: CPT

## 2017-10-04 PROCEDURE — 97165 OT EVAL LOW COMPLEX 30 MIN: CPT

## 2017-10-04 RX ORDER — HYDROCODONE BITARTRATE AND ACETAMINOPHEN 7.5; 325 MG/1; MG/1
1 TABLET ORAL EVERY 4 HOURS PRN
Qty: 40 TABLET | Refills: 0 | Status: SHIPPED | OUTPATIENT
Start: 2017-10-04 | End: 2017-10-13

## 2017-10-04 RX ORDER — PSEUDOEPHEDRINE HCL 30 MG
100 TABLET ORAL 2 TIMES DAILY PRN
Qty: 60 CAPSULE | Refills: 0 | Status: SHIPPED | OUTPATIENT
Start: 2017-10-04 | End: 2017-11-14

## 2017-10-04 RX ADMIN — ASPIRIN 325 MG: 325 TABLET, DELAYED RELEASE ORAL at 09:43

## 2017-10-04 RX ADMIN — HYDROCODONE BITARTRATE AND ACETAMINOPHEN 1 TABLET: 7.5; 325 TABLET ORAL at 12:41

## 2017-10-04 RX ADMIN — HYDROCODONE BITARTRATE AND ACETAMINOPHEN 1 TABLET: 7.5; 325 TABLET ORAL at 05:42

## 2017-10-04 RX ADMIN — MELOXICAM 15 MG: 7.5 TABLET ORAL at 09:43

## 2017-10-04 RX ADMIN — Medication 2 TABLET: at 09:43

## 2017-10-04 NOTE — THERAPY DISCHARGE NOTE
Acute Care - Physical Therapy Treatment Note/Discharge   [facility]     Patient Name: Jacques Yeboah  : 1945  MRN: [id number]  Today's Date: 10/4/2017  Onset of Illness/Injury or Date of Surgery Date: 10/03/17  Date of Referral to PT: 10/03/17  Referring Physician: MD Carroll    Admit Date: 10/3/2017    Visit Dx:    ICD-10-CM ICD-9-CM   1. Impaired functional mobility, balance, gait, and endurance Z74.09 V49.89   2. Impaired mobility and ADLs Z74.09 799.89   3. Status post total replacement of right hip Z96.641 V43.64     Patient Active Problem List   Diagnosis   • Hyperlipidemia   • Arthritis of right hip   • Status post total replacement of right hip   • Acute blood loss anemia, mild, asymptomatic       Physical Therapy Education     Title: PT OT SLP Therapies (Active)     Topic: Physical Therapy (Done)     Point: Mobility training (Done)    Learning Progress Summary    Learner Readiness Method Response Comment Documented by Status   Patient Acceptance MARIO ROMERO DU Educated on hip precautions, use of leg , correct stair technique. LR 10/04/17 1150 Done    Acceptance E,D,H GABY SHRESTHA Issued and reviewed written/illustrated ESTHER HEP. Educated on correct technique with stair training and correct car t/f technique. LR 10/04/17 1025 Done    Acceptance EMARIO NR Reviewed hip precautions, WB status  10/03/17 1420 Done   Family Acceptance E,MARIO,H GABY SHRESTHA Issued and reviewed written/illustrated ESTHER HEP. Educated on correct technique with stair training and correct car t/f technique. LR 10/04/17 1025 Done   Significant Other Acceptance MARIO ROMERO DU Educated on hip precautions, use of leg , correct stair technique. LR 10/04/17 1150 Done    Acceptance E,D,H GABY SHRESTHA Issued and reviewed written/illustrated ESTHER HEP. Educated on correct technique with stair training and correct car t/f technique. LR 10/04/17 1025 Done               Point: Home exercise program (Done)    Learning Progress Summary    Learner Readiness Method  Response Comment Documented by Status   Patient Acceptance E,D VU,DU Educated on hip precautions, use of leg , correct stair technique. LR 10/04/17 1150 Done    Acceptance E,D,H VU,DU Issued and reviewed written/illustrated ESTHER HEP. Educated on correct technique with stair training and correct car t/f technique. LR 10/04/17 1025 Done   Family Acceptance E,D,H VU,DU Issued and reviewed written/illustrated ESTHER HEP. Educated on correct technique with stair training and correct car t/f technique. LR 10/04/17 1025 Done   Significant Other Acceptance E,D VU,DU Educated on hip precautions, use of leg , correct stair technique. LR 10/04/17 1150 Done    Acceptance E,D,H VU,DU Issued and reviewed written/illustrated ESTHER HEP. Educated on correct technique with stair training and correct car t/f technique. LR 10/04/17 1025 Done               Point: Body mechanics (Done)    Learning Progress Summary    Learner Readiness Method Response Comment Documented by Status   Patient Acceptance E,D VU,DU Educated on hip precautions, use of leg , correct stair technique. LR 10/04/17 1150 Done    Acceptance E,D,H VU,DU Issued and reviewed written/illustrated ESTHER HEP. Educated on correct technique with stair training and correct car t/f technique. LR 10/04/17 1025 Done    Acceptance E,D VU,NR Reviewed hip precautions, WB status MJ 10/03/17 1420 Done   Family Acceptance E,D,H VU,DU Issued and reviewed written/illustrated ESTHER HEP. Educated on correct technique with stair training and correct car t/f technique. LR 10/04/17 1025 Done   Significant Other Acceptance E,D VU,DU Educated on hip precautions, use of leg , correct stair technique. LR 10/04/17 1150 Done    Acceptance E,D,H VU,DU Issued and reviewed written/illustrated ESTHER HEP. Educated on correct technique with stair training and correct car t/f technique. LR 10/04/17 1025 Done               Point: Precautions (Done)    Learning Progress Summary    Learner  Readiness Method Response Comment Documented by Status   Patient Acceptance EMARIO DU Educated on hip precautions, use of leg , correct stair technique. LR 10/04/17 1150 Done    Acceptance E,D,H GABY SHRESTHA Issued and reviewed written/illustrated ESTHER HEP. Educated on correct technique with stair training and correct car t/f technique. LR 10/04/17 1025 Done    Acceptance E,D FADY,NR Reviewed hip precautions, WB status  10/03/17 1420 Done   Family Acceptance E,D,H GABY SHRESTHA Issued and reviewed written/illustrated ESTHER HEP. Educated on correct technique with stair training and correct car t/f technique. LR 10/04/17 1025 Done   Significant Other Acceptance MARIO ROMERO DU Educated on hip precautions, use of leg , correct stair technique. LR 10/04/17 1150 Done    Acceptance E,MARIO,H GABY SHRESTHA Issued and reviewed written/illustrated ESTHER HEP. Educated on correct technique with stair training and correct car t/f technique. LR 10/04/17 1025 Done                      User Key     Initials Effective Dates Name Provider Type Discipline    LR 06/19/15 -  Solange Box, PT Physical Therapist PT     03/14/16 -  Daron Sauer, PT Physical Therapist PT                    IP PT Goals       10/04/17 0914 10/03/17 1421       Bed Mobility PT LTG    Bed Mobility PT LTG, Date Established 10/03/17  -LR 10/03/17  -MJ     Bed Mobility PT LTG, Time to Achieve 5 days  -LR 5 days  -MJ     Bed Mobility PT LTG, Activity Type supine to sit/sit to supine  -LR supine to sit/sit to supine  -MJ     Bed Mobility PT LTG, [place] Level conditional independence  -LR conditional independence  -MJ     Bed Mobility PT LTG, Date Goal Reviewed 10/04/17  -LR 10/03/17  -MJ     Bed Mobility PT LTG, Outcome goal not met  -LR goal ongoing  -MJ     Bed Mobility PT LTG, Reason Goal Not Met discharged from facility  -LR      Transfer Training PT LTG    Transfer Training PT LTG, Date Established 10/03/17  -LR 10/03/17  -MJ     Transfer Training PT LTG, Time to Achieve  5 days  -LR 5 days  -MJ     Transfer Training PT LTG, Activity Type sit to stand/stand to sit  -LR sit to stand/stand to sit  -MJ     Transfer Training PT LTG, [place] Level conditional independence  -LR conditional independence  -MJ     Transfer Training PT LTG, Assist Device walker, rolling  -LR walker, rolling  -MJ     Transfer Training PT  LTG, Date Goal Reviewed 10/04/17  -LR 10/03/17  -MJ     Transfer Training PT LTG, Outcome goal not met  -LR goal ongoing  -MJ     Transfer Training PT LTG, Reason Goal Not Met discharged from facility  -LR      Gait Training PT LTG    Gait Training Goal PT LTG, Date Established 10/03/17  -LR 10/03/17  -MJ     Gait Training Goal PT LTG, Time to Achieve 5 days  -LR 5 days  -MJ     Gait Training Goal PT LTG, [place] Level conditional independence  -LR conditional independence  -MJ     Gait Training Goal PT LTG, Assist Device walker, rolling  -LR walker, rolling  -MJ     Gait Training Goal PT LTG, Distance to Achieve 500 feet  - feet  -MJ     Gait Training Goal PT LTG, Date Goal Reviewed 10/04/17  -LR 10/03/17  -MJ     Gait Training Goal PT LTG, Outcome goal not met  -LR goal ongoing  -MJ     Gait Training Goal PT LTG, Reason Goal Not Met discharged from facility  -LR      Stair Training PT LTG    Stair Training Goal PT LTG, Date Established 10/03/17  -LR 10/03/17  -MJ     Stair Training Goal PT LTG, Time to Achieve 5 days  -LR 5 days  -MJ     Stair Training Goal PT LTG, Number of Steps 1  -LR 1  -MJ     Stair Training Goal PT LTG, [place] Level contact guard assist  -LR contact guard assist  -MJ     Stair Training Goal PT LTG, Assist Device walker, rolling   backwards  -LR walker, rolling   backward  -MJ     Stair Training Goal PT LTG, Date Goal Reviewed 10/04/17  -LR 10/03/17  -MJ     Stair Training Goal PT LTG, Outcome goal met  -LR goal ongoing  -MJ       User Key  (r) = Recorded By, (t) = Taken By, (c) = Cosigned By    Initials Name Provider Type    LR  Solange Box, PT Physical Therapist    SITA Sauer PT Physical Therapist              Adult Rehabilitation Note       10/04/17 1123 10/04/17 0914       Rehab Assessment/Intervention    Discipline physical therapist  -LR physical therapist  -LR     Document Type therapy note (daily note);discharge summary  -LR therapy note (daily note);discharge summary  -LR     Subjective Information agree to therapy;no complaints   denies pain at rest  -LR agree to therapy;complains of;pain  -LR     Patient Effort, Rehab Treatment good  -LR good  -LR     Symptoms Noted During/After Treatment increased pain  -LR none  -LR     Symptoms Noted Comment Notified RN.   -LR      Precautions/Limitations fall precautions;hip precautions- right  -LR fall precautions;hip precautions- right  -LR     Recorded by [LR] Solange Box, PT [LR] Solange Box, PT     Pain Assessment    Pain Assessment 0-10  -LR 0-10  -LR     Pain Score 0  -LR 2  -LR     Post Pain Score 4  -LR 0  -LR     Pain Type Acute pain  -LR Acute pain  -LR     Pain Location Hip  -LR Hip  -LR     Pain Orientation Right  -LR Right  -LR     Pain Intervention(s) Repositioned;Ambulation/increased activity  -LR Repositioned;Ambulation/increased activity  -LR     Recorded by [LR] Solange Box, PT [LR] Solange Box, PT     Cognitive Assessment/Intervention    Current Cognitive/Communication Assessment functional  -LR functional  -LR     Orientation Status oriented x 4;required verbal cueing (specifiy in comments)  -LR oriented x 4;required verbal cueing (specifiy in comments)  -LR     Follows Commands/Answers Questions 100% of the time;able to follow single-step instructions;needs cueing;needs repetition  -% of the time;able to follow single-step instructions;needs cueing;needs repetition  -LR     Personal Safety WNL/WFL  -LR WNL/WFL  -LR     Recorded by [LR] Solange Box, PT [LR] Solange Box, PT     Mobility  Assessment/Training    Extremity Weight-Bearing Status right lower extremity  -LR right lower extremity  -LR     Right Lower Extremity Weight-Bearing weight-bearing as tolerated  -LR weight-bearing as tolerated  -LR     Recorded by [LR] Solange Box, PT [LR] Solange Box, PT     Bed Mobility, Assessment/Treatment    Bed Mobility, Assistive Device head of bed elevated;bed rails;leg   -LR      Bed Mob, Supine to Sit, [place] verbal cues required;contact guard assist  -LR not tested   UIC upon arrival.   -LR     Bed Mob, Sit to Supine, [place] not tested   UIC at end of treatment.   -LR not tested   UIC at end of treatment.   -LR     Bed Mobility, Safety Issues decreased use of legs for bridging/pushing  -LR      Bed Mobility, Impairments ROM decreased;strength decreased;pain  -LR      Bed Mobility, Comment Verbal cues for correct use of leg  to move R LE towards EOB. Cues to push up from bed from behind to raise trunk into sitting and to scoot hips out to get feet on floor. Denied dizziness upon sitting up.   -LR      Recorded by [LR] Solange Box, PT [LR] Solange Box, PT     Transfer Assessment/Treatment    Transfers, Sit-Stand [place] verbal cues required;contact guard assist  -LR verbal cues required;contact guard assist  -LR     Transfers, Stand-Sit [place] verbal cues required;contact guard assist  -LR verbal cues required;contact guard assist  -LR     Transfers, Sit-Stand-Sit, Assist Device rolling walker  -LR rolling walker  -LR     Transfer, Safety Issues sequencing ability decreased;step length decreased;weight-shifting ability decreased  -LR sequencing ability decreased;step length decreased;weight-shifting ability decreased  -LR     Transfer, Impairments ROM decreased;strength decreased;pain  -LR ROM decreased;strength decreased;pain  -LR     Transfer, Comment Verbal cues for correct hand placement and to step R LE out before t/f to avoid  flexing at hip past 90 degrees.   -LR Verbal cues to push up from chair to stand and to reach back for chair to lower into sitting. Verbal cues to step R LE out before t/f to avoid flexing at hips past 90 degrees during t/f.   -LR     Recorded by [LR] Solange Box, PT [LR] Solange Box, PT     Gait Assessment/Treatment    Gait, [place] Level verbal cues required;contact guard assist  -LR verbal cues required;contact guard assist  -LR     Gait, Assistive Device rolling walker  -LR rolling walker  -LR     Gait, Distance (Feet) 150  -  -LR     Gait, Gait Pattern Analysis swing-through gait  -LR swing-to gait  -LR     Gait, Gait Deviations right:;antalgic;decreased heel strike;forward flexed posture;step length decreased;toe-to-floor clearance decreased;weight-shifting ability decreased  -LR right:;antalgic;decreased heel strike;forward flexed posture;step length decreased;toe-to-floor clearance decreased;weight-shifting ability decreased  -LR     Gait, Safety Issues sequencing ability decreased;step length decreased;weight-shifting ability decreased  -LR sequencing ability decreased;step length decreased;weight-shifting ability decreased  -LR     Gait, Impairments ROM decreased;strength decreased;pain  -LR ROM decreased;strength decreased;pain  -LR     Gait, Comment Patient ambulated with step through gait pattern at slow pace. Patient pauses after stepping forward with L LE. Improved with cues for increased R LE stance phase and weight bearing. Gait limited by pain.   -LR Patient ambulated with step to gait pattern at slow pace with short stance phase on R and moderate antalgia. Improved with cues for increased R LE weight bearing/stance phase, decreased UE weight bearing, and increased R knee flexion during swing phase. Nearly able to progress to step through gait pattern near end of ambulation. Gait limited by pain/fatigue.   -LR     Recorded by [LR] Solange Box, PT [LR] Solange  Anjelica Box, PT     Stairs Assessment/Treatment    Number of Stairs 2  -LR 5  -LR     Stairs, Handrail Location none  -LR other (see comments)   2 single steps with RW; 3 steps SPC on L, HHA on R  -LR     Stairs, [place] Level verbal cues required;contact guard assist  -LR verbal cues required;contact guard assist;2 person assist required  -LR     Stairs, Assistive Device walker  -LR walker  -LR     Stairs, Technique Used step to step (ascending);step to step (descending)  -LR step to step (ascending);step to step (descending)  -LR     Stairs, Safety Issues sequencing ability decreased;weight-shifting ability decreased  -LR sequencing ability decreased;weight-shifting ability decreased  -LR     Stairs, Impairments ROM decreased;strength decreased;pain  -LR ROM decreased;strength decreased;pain  -LR     Stairs, Comment Verbal cues to back up to step with RW and to step up with strong LE first backwards and down with weak LE first.   -LR Patient initially confirmed she only had one step to enter home. Therefore, performed stair training for one step using RW for UE support and cues to step up backward with strong LE first and then down with weak LE first. Performed twice. Patient then stated she actually has 2 steps to enter home and they are not deep enough to place RW on them. Completed stair training with SPC in L hand and HHA on R. Verbal cues to step up with strong LE first and down with weak LE first. Cues for correct placement and sequencing of SPC.   -LR     Recorded by [LR] Solange Box, PT [LR] Solange Box, PT     Therapy Exercises    Exercise Protocols total hip  -LR total hip  -LR     Total Hip Exercises --   pt declined review of HEP  -LR right:;15 reps;completed protocol;with assist;ankle pumps/circles;quad set;glut set;heel slides;hip abduction;SAQ;SLR;LAQ   cues for technique;min assist LAQ,SAQ,SLR,hip abd  -LR     Recorded by [LR] Solange Box, PT [LR] Solange  Anjelica oBx, PT     Positioning and Restraints    Pre-Treatment Position in bed  -LR sitting in chair/recliner  -LR     Post Treatment Position chair  -LR chair  -LR     In Chair notified nsg;reclined;sitting;call light within reach;encouraged to call for assist;with family/caregiver;legs elevated  -LR notified nsg;reclined;sitting;call light within reach;encouraged to call for assist;with family/caregiver;legs elevated  -LR     Recorded by [LR] Solange Box, PT [LR] Solange Box, PT       User Key  (r) = Recorded By, (t) = Taken By, (c) = Cosigned By    Initials Name Effective Dates    LR Solange Box, PT 06/19/15 -           PT Recommendation and Plan  Anticipated Discharge Disposition: home with assist, home with outpatient services  Planned Therapy Interventions: balance training, bed mobility training, gait training, home exercise program, patient/family education, stair training, strengthening, transfer training  PT Frequency: 2 times/day  Plan of Care Review  Plan Of Care Reviewed With: patient, spouse  Progress: progress toward functional goals as expected  Outcome Summary/Follow up Plan: Patient requested PT return to review bed mobility and stair training. Issued leg  and educated on its use to assist movement of R LE during bed mobility and t/f. Completed stair training with RW. Patient has been d/c home with HHPT.           Outcome Measures       10/04/17 1123 10/04/17 0914 10/04/17 0859    How much help from another person do you currently need...    Turning from your back to your side while in flat bed without using bedrails? 3  -LR 3  -LR     Moving from lying on back to sitting on the side of a flat bed without bedrails? 3  -LR 3  -LR     Moving to and from a bed to a chair (including a wheelchair)? 3  -LR 3  -LR     Standing up from a chair using your arms (e.g., wheelchair, bedside chair)? 3  -LR 3  -LR     Climbing 3-5 steps with a railing? 3  -LR 3  -LR     To  walk in hospital room? 3  -LR 3  -LR     AM-PAC 6 Clicks Score 18  -LR 18  -LR     How much help from another is currently needed...    Putting on and taking off regular lower body clothing?   3  -MC    Bathing (including washing, rinsing, and drying)   3  -MC    Toileting (which includes using toilet bed pan or urinal)   3  -MC    Putting on and taking off regular upper body clothing   4  -MC    Taking care of personal grooming (such as brushing teeth)   4  -MC    Eating meals   4  -MC    Score   21  -MC    Functional Assessment    Outcome Measure Options AM-PAC 6 Clicks Basic Mobility (PT)  -LR AM-PAC 6 Clicks Basic Mobility (PT)  -LR AM-PAC 6 Clicks Daily Activity (OT)  -      10/03/17 1334          How much help from another person do you currently need...    Turning from your back to your side while in flat bed without using bedrails? 3  -MJ      Moving from lying on back to sitting on the side of a flat bed without bedrails? 3  -MJ      Moving to and from a bed to a chair (including a wheelchair)? 3  -MJ      Standing up from a chair using your arms (e.g., wheelchair, bedside chair)? 3  -MJ      Climbing 3-5 steps with a railing? 3  -MJ      To walk in hospital room? 3  -MJ      AM-PAC 6 Clicks Score 18  -MJ      Functional Assessment    Outcome Measure Options AM-PAC 6 Clicks Basic Mobility (PT)  -MJ        User Key  (r) = Recorded By, (t) = Taken By, (c) = Cosigned By    Initials Name Provider Type    LR Solange Box, PT Physical Therapist    CAROL Calabrese, OT Occupational Therapist    SITA aSuer, PT Physical Therapist           Time Calculation:         PT Charges       10/04/17 1152 10/04/17 1029       Time Calculation    Start Time 1123  -LR 0914  -LR     PT Received On 10/04/17  -LR 10/04/17  -LR     PT Goal Re-Cert Due Date 10/13/17  -LR 10/13/17  -LR     Time Calculation- PT    Total Timed Code Minutes- PT 14 minute(s)  -LR 24 minute(s)  -LR       User Key  (r) = Recorded By, (t) =  Taken By, (c) = Cosigned By    Initials Name Provider Type    LR Solange Box, PT Physical Therapist          Therapy Charges for Today     Code Description Service Date Service Provider Modifiers Qty    74782084442 HC GAIT TRAINING EA 15 MIN 10/4/2017 Solange Box, PT GP 1    56379257799 HC PT THER PROC EA 15 MIN 10/4/2017 Solange Box, PT GP 1    47062115141 HC GAIT TRAINING EA 15 MIN 10/4/2017 Solange Box, PT GP 1          PT G-Codes  Outcome Measure Options: AM-PAC 6 Clicks Basic Mobility (PT)    PT Discharge Summary  Anticipated Discharge Disposition: home with assist, home with outpatient services  Reason for Discharge: Discharge from facility  Outcomes Achieved: Patient able to partially acheive established goals  Discharge Destination: Home with assist, Home with outpatient services    Solange Box, PT  10/4/2017

## 2017-10-04 NOTE — DISCHARGE SUMMARY
Patient Name: Jacques Yeboah  MRN: [id number]  : 1945  DOS: 10/5/2017    Attending: No att. providers found    Primary Care Provider: Regan Adams MD    Date of Admission:.10/3/2017  5:46 AM    Date of Discharge:  10/5/2017    Discharge Diagnosis: Principal Problem:    Status post total replacement of right hip  Active Problems:    Hyperlipidemia    Arthritis of right hip    Acute blood loss anemia, mild, asymptomatic      Hospital Course  Patient is a 72 y.o. female presented for right total hip arthroplasty by Dr. Hodges under spinal anesthesia. She tolerated surgery well and was admitted for further medical management. Her hip has been severely painful for the last six months. She denies use of assistive device for ambulation.    The patient has done well postop. The patient has been able to ambulate 250 feet with PT.  The patient has had good pain control with PO pain medications.   The patient was placed on DVT prophylaxis including aspirin. The patient was encouraged to use IS for atelectasis prophylaxis.     The patient was placed on a bowel regimen to prevent constipation while on pain medication.   The patient's H/H was monitored with a slight decrease that remained asymptomatic.    It is felt by all involved that the patient can discharge home at this time, and the patient has no further questions        Procedures Performed  DATE OF PROCEDURE:  10/03/17     SURGEON: Prem Hodges MD     ASSISTANT: Naima Braden PA-C      PREOPERATIVE DIAGNOSIS: Right hip arthritis     POSTOPERATIVE DIAGNOSIS: Right hip arthritis     PROCEDURE PERFORMED: Right total hip arthroplasty with DePuy components (# 50mm Press-Fit [place] cup with + 4 neutral polyethylene liner with # 5 hi Offset Press-Fit Troy Stem with + 1 x 32mm ceramic head).        Pertinent Test Results:    I reviewed the patient's new clinical results.     Results from last 7 days  Lab Units 10/04/17  0659   WBC 10*3/mm3 6.41  "  HEMOGLOBIN g/dL 10.3*   HEMATOCRIT % 31.6*   PLATELETS 10*3/mm3 174       Results from last 7 days  Lab Units 10/04/17  0659   SODIUM mmol/L 137   POTASSIUM mmol/L 3.9   CHLORIDE mmol/L 106   CO2 mmol/L 29.0   BUN mg/dL 13   CREATININE mg/dL 0.60   CALCIUM mg/dL 8.7   GLUCOSE mg/dL 105*     I reviewed the patient's new imaging including images and reports.      Physical therapy: Patient ambulated 250 feet with step to gait pattern, limited by pain/fatigue. Progressed to stair training to simulate home environment. CGA for all mobility for safety. Patient has been d/c home with outpatient PT services. Needs ongoing reinforcement and clarification of hip precautions.     Discharge Assessment:    Vital Signs  /55 (BP Location: Right arm, Patient Position: Sitting)  Pulse 73  Temp 97.2 °F (36.2 °C) (Oral)   Resp 16  Ht 64.6\" (164.1 cm)  Wt 147 lb (66.7 kg)  SpO2 97%  BMI 24.77 kg/m2  No data recorded.      General Appearance:    Alert, cooperative, in no acute distress   Lungs:     Clear to auscultation,respirations regular, even and                   unlabored    Heart:    Regular rhythm and normal rate, normal S1 and S2   Abdomen:     Normal bowel sounds, no masses, no organomegaly, soft        non-tender, non-distended, no guarding, no rebound                 tenderness   Extremities:   Moves all extremities well, no edema, no cyanosis, no              Redness. Right hip [place] CDI   Pulses:   Pulses palpable and equal bilaterally   Skin:   No bleeding, bruising or rash   Neurologic:   Cranial nerves 2 - 12 grossly intact, sensation intact. Flexion and dorsiflexion intact bilateral feet.       Discharge Disposition: Home    Discharge Medications   Jacques Yeboah   Home Medication Instructions CHRISTINE:[id number]    Printed on:10/05/17 8497   Medication Information                      aspirin  MG EC tablet  Take 1 tablet by mouth Daily. For 1 month             calcium carbonate (OS-SKYLER) 600 MG " tablet  Take 600 mg by mouth 2 (Two) Times a Day With Meals.             docusate sodium 100 MG capsule  Take 100 mg by mouth 2 (Two) Times a Day As Needed for Constipation.             HYDROcodone-acetaminophen (NORCO) 7.5-325 MG per tablet  Take 1 tablet by mouth Every 4 (Four) Hours As Needed for Moderate Pain  for up to 9 days.             simvastatin (ZOCOR) 20 MG tablet  Take 20 mg by mouth Every Night.                 Discharge Diet: regular diet    Activity at Discharge: WBAT RLE    Follow-up Appointments  Dr. Hodges per his orders    Seen and examined by me. Agree with above. Discussed with patient.     Lauren Quinones MD  10/05/17  5:29 PM

## 2017-10-04 NOTE — PROGRESS NOTES
Discharge Planning Assessment  [facility]     Patient Name: Jacques Yeboah  MRN: [id number]  Today's Date: 10/4/2017    Admit Date: 10/3/2017          Discharge Needs Assessment       10/04/17 1531    Living Environment    Lives With spouse    Living Arrangements house    Transportation Available car;family or friend will provide    Living Environment    Provides Primary Care For no one    Quality Of Family Relationships supportive    Able to Return to Prior Living Arrangements yes    Discharge Needs Assessment    Equipment Currently Used at Home walker, rolling;commode;shower chair    Equipment Needed After Discharge none            Discharge Plan       10/04/17 1532    Case Management/Social Work Plan    Plan Home w/ outpatient PT    Patient/Family In Agreement With Plan yes    Additional Comments Spoke with patient at bedside. She lives with her  in a one story house in [facility]. PTA she was independent with ADL's and used a RW to assist with mobility if needed. She has been recommended by PT to participate in the home Transitions outpatient PT program and is agreeable. A referral has been made and they are aware of patient's pending dc today. Family transporting. Goal is to return home. CM will follow.         Discharge Placement     No information found        Expected Discharge Date and Time     Expected Discharge Date Expected Discharge Time    Oct 4, 2017               Demographic Summary       10/04/17 1530    Referral Information    Arrived From home or self-care    Reason For Consult discharge planning            Functional Status       10/04/17 1530    Functional Status Prior    Ambulation 0-->independent    Transferring 0-->independent    Toileting 0-->independent    Bathing 0-->independent    Dressing 0-->independent    Eating 0-->independent    Communication 0-->understands/communicates without difficulty    Swallowing 0-->swallows foods/liquids without difficulty    Activity  Tolerance    Usual Activity Tolerance good    Current Activity Tolerance moderate            Psychosocial     None            Abuse/Neglect     None            Legal     None            Substance Abuse     None            Patient Forms     None          Love Bloom RN

## 2017-10-04 NOTE — THERAPY DISCHARGE NOTE
Acute Care - Physical Therapy Treatment Note/Discharge  [facility]     Patient Name: Jacques Yeboah  : 1945  MRN: [id number]  Today's Date: 10/4/2017  Onset of Illness/Injury or Date of Surgery Date: 10/03/17  Date of Referral to PT: 10/03/17  Referring Physician: MD Carroll    Admit Date: 10/3/2017    Visit Dx:    ICD-10-CM ICD-9-CM   1. Impaired functional mobility, balance, gait, and endurance Z74.09 V49.89   2. Impaired mobility and ADLs Z74.09 799.89   3. Status post total replacement of right hip Z96.641 V43.64     Patient Active Problem List   Diagnosis   • Hyperlipidemia   • Arthritis of right hip   • Status post total replacement of right hip       Physical Therapy Education     Title: PT OT SLP Therapies (Active)     Topic: Physical Therapy (Done)     Point: Mobility training (Done)    Learning Progress Summary    Learner Readiness Method Response Comment Documented by Status   Patient Acceptance E,MARIO,H GABY SHRESTHA Issued and reviewed written/illustrated ESTHER HEP. Educated on correct technique with stair training and correct car t/f technique. LR 10/04/17 1025 Done    Acceptance E,MARIO SHRESTHA,MARILEE Reviewed hip precautions, WB status MJ 10/03/17 1420 Done   Family Acceptance E,MARIO,H GABY SHRESTHA Issued and reviewed written/illustrated ESTHER HEP. Educated on correct technique with stair training and correct car t/f technique. LR 10/04/17 1025 Done   Significant Other Acceptance E,MARIO,H GABY SHRESTHA Issued and reviewed written/illustrated ESTHER HEP. Educated on correct technique with stair training and correct car t/f technique. LR 10/04/17 1025 Done               Point: Home exercise program (Done)    Learning Progress Summary    Learner Readiness Method Response Comment Documented by Status   Patient Acceptance E,MARIO,H GABY SHRESTHA Issued and reviewed written/illustrated ESTHER HEP. Educated on correct technique with stair training and correct car t/f technique. LR 10/04/17 1025 Done   Family Acceptance E,MARIO,H GABY SHRESTHA Issued and reviewed  written/illustrated ESTHER HEP. Educated on correct technique with stair training and correct car t/f technique. LR 10/04/17 1025 Done   Significant Other Acceptance E,D,H VU,DU Issued and reviewed written/illustrated ESTHER HEP. Educated on correct technique with stair training and correct car t/f technique. LR 10/04/17 1025 Done               Point: Body mechanics (Done)    Learning Progress Summary    Learner Readiness Method Response Comment Documented by Status   Patient Acceptance E,D,H VU,DU Issued and reviewed written/illustrated ESTHER HEP. Educated on correct technique with stair training and correct car t/f technique. LR 10/04/17 1025 Done    Acceptance E,D VU,NR Reviewed hip precautions, WB status MJ 10/03/17 1420 Done   Family Acceptance E,D,H VU,DU Issued and reviewed written/illustrated ESTHER HEP. Educated on correct technique with stair training and correct car t/f technique. LR 10/04/17 1025 Done   Significant Other Acceptance E,D,H VU,DU Issued and reviewed written/illustrated ESTHER HEP. Educated on correct technique with stair training and correct car t/f technique. LR 10/04/17 1025 Done               Point: Precautions (Done)    Learning Progress Summary    Learner Readiness Method Response Comment Documented by Status   Patient Acceptance E,D,H VU,DU Issued and reviewed written/illustrated ESTHER HEP. Educated on correct technique with stair training and correct car t/f technique. LR 10/04/17 1025 Done    Acceptance E,D VU,NR Reviewed hip precautions, WB status MJ 10/03/17 1420 Done   Family Acceptance E,D,H VU,DU Issued and reviewed written/illustrated ESTHER HEP. Educated on correct technique with stair training and correct car t/f technique. LR 10/04/17 1025 Done   Significant Other Acceptance E,D,H VU,DU Issued and reviewed written/illustrated ESTHER HEP. Educated on correct technique with stair training and correct car t/f technique. LR 10/04/17 1025 Done                      User Key     Initials Effective  Dates Name Provider Type Discipline    LR 06/19/15 -  Solange Box, PT Physical Therapist PT    MJ 03/14/16 -  Daron Sauer, PT Physical Therapist PT                    IP PT Goals       10/04/17 0914 10/03/17 1421       Bed Mobility PT LTG    Bed Mobility PT LTG, Date Established 10/03/17  -LR 10/03/17  -MJ     Bed Mobility PT LTG, Time to Achieve 5 days  -LR 5 days  -MJ     Bed Mobility PT LTG, Activity Type supine to sit/sit to supine  -LR supine to sit/sit to supine  -MJ     Bed Mobility PT LTG, [place] Level conditional independence  -LR conditional independence  -MJ     Bed Mobility PT LTG, Date Goal Reviewed 10/04/17  -LR 10/03/17  -MJ     Bed Mobility PT LTG, Outcome goal not met  -LR goal ongoing  -MJ     Bed Mobility PT LTG, Reason Goal Not Met discharged from facility  -LR      Transfer Training PT LTG    Transfer Training PT LTG, Date Established 10/03/17  -LR 10/03/17  -MJ     Transfer Training PT LTG, Time to Achieve 5 days  -LR 5 days  -MJ     Transfer Training PT LTG, Activity Type sit to stand/stand to sit  -LR sit to stand/stand to sit  -MJ     Transfer Training PT LTG, [place] Level conditional independence  -LR conditional independence  -MJ     Transfer Training PT LTG, Assist Device walker, rolling  -LR walker, rolling  -MJ     Transfer Training PT  LTG, Date Goal Reviewed 10/04/17  -LR 10/03/17  -MJ     Transfer Training PT LTG, Outcome goal not met  -LR goal ongoing  -MJ     Transfer Training PT LTG, Reason Goal Not Met discharged from facility  -LR      Gait Training PT LTG    Gait Training Goal PT LTG, Date Established 10/03/17  -LR 10/03/17  -MJ     Gait Training Goal PT LTG, Time to Achieve 5 days  -LR 5 days  -MJ     Gait Training Goal PT LTG, [place] Level conditional independence  -LR conditional independence  -MJ     Gait Training Goal PT LTG, Assist Device walker, rolling  -LR walker, rolling  -MJ     Gait Training Goal PT LTG, Distance to Achieve 500 feet  -LR  500 feet  -MJ     Gait Training Goal PT LTG, Date Goal Reviewed 10/04/17  -LR 10/03/17  -MJ     Gait Training Goal PT LTG, Outcome goal not met  -LR goal ongoing  -MJ     Gait Training Goal PT LTG, Reason Goal Not Met discharged from facility  -LR      Stair Training PT LTG    Stair Training Goal PT LTG, Date Established 10/03/17  -LR 10/03/17  -MJ     Stair Training Goal PT LTG, Time to Achieve 5 days  -LR 5 days  -MJ     Stair Training Goal PT LTG, Number of Steps 1  -LR 1  -MJ     Stair Training Goal PT LTG, [place] Level contact guard assist  -LR contact guard assist  -MJ     Stair Training Goal PT LTG, Assist Device walker, rolling   backwards  -LR walker, rolling   backward  -MJ     Stair Training Goal PT LTG, Date Goal Reviewed 10/04/17  -LR 10/03/17  -MJ     Stair Training Goal PT LTG, Outcome goal met  -LR goal ongoing  -MJ       User Key  (r) = Recorded By, (t) = Taken By, (c) = Cosigned By    Initials Name Provider Type    LR Solange Box, PT Physical Therapist    SITA Sauer, PT Physical Therapist              Adult Rehabilitation Note       10/04/17 0914          Rehab Assessment/Intervention    Discipline physical therapist  -LR      Document Type therapy note (daily note);discharge summary  -LR      Subjective Information agree to therapy;complains of;pain  -LR      Patient Effort, Rehab Treatment good  -LR      Symptoms Noted During/After Treatment none  -LR      Precautions/Limitations fall precautions;hip precautions- right  -LR      Recorded by [LR] Solange Box, PT      Pain Assessment    Pain Assessment 0-10  -LR      Pain Score 2  -LR      Post Pain Score 0  -LR      Pain Type Acute pain  -LR      Pain Location Hip  -LR      Pain Orientation Right  -LR      Pain Intervention(s) Repositioned;Ambulation/increased activity  -LR      Recorded by [LR] Solange Box, PT      Cognitive Assessment/Intervention    Current Cognitive/Communication Assessment functional   -LR      Orientation Status oriented x 4;required verbal cueing (specifiy in comments)  -LR      Follows Commands/Answers Questions 100% of the time;able to follow single-step instructions;needs cueing;needs repetition  -LR      Personal Safety WNL/WFL  -LR      Recorded by [LR] Solange Box, PT      Mobility Assessment/Training    Extremity Weight-Bearing Status right lower extremity  -LR      Right Lower Extremity Weight-Bearing weight-bearing as tolerated  -LR      Recorded by [LR] Solange Box, PT      Bed Mobility, Assessment/Treatment    Bed Mob, Supine to Sit, [place] not tested   UIC upon arrival.   -LR      Bed Mob, Sit to Supine, [place] not tested   UIC at end of treatment.   -LR      Recorded by [LR] Solange Box, PT      Transfer Assessment/Treatment    Transfers, Sit-Stand [place] verbal cues required;contact guard assist  -LR      Transfers, Stand-Sit [place] verbal cues required;contact guard assist  -LR      Transfers, Sit-Stand-Sit, Assist Device rolling walker  -LR      Transfer, Safety Issues sequencing ability decreased;step length decreased;weight-shifting ability decreased  -LR      Transfer, Impairments ROM decreased;strength decreased;pain  -LR      Transfer, Comment Verbal cues to push up from chair to stand and to reach back for chair to lower into sitting. Verbal cues to step R LE out before t/f to avoid flexing at hips past 90 degrees during t/f.   -LR      Recorded by [LR] Solange Box, PT      Gait Assessment/Treatment    Gait, [place] Level verbal cues required;contact guard assist  -LR      Gait, Assistive Device rolling walker  -LR      Gait, Distance (Feet) 250  -LR      Gait, Gait Pattern Analysis swing-to gait  -LR      Gait, Gait Deviations right:;antalgic;decreased heel strike;forward flexed posture;step length decreased;toe-to-floor clearance decreased;weight-shifting ability decreased  -LR      Gait, Safety Issues  sequencing ability decreased;step length decreased;weight-shifting ability decreased  -LR      Gait, Impairments ROM decreased;strength decreased;pain  -LR      Gait, Comment Patient ambulated with step to gait pattern at slow pace with short stance phase on R and moderate antalgia. Improved with cues for increased R LE weight bearing/stance phase, decreased UE weight bearing, and increased R knee flexion during swing phase. Nearly able to progress to step through gait pattern near end of ambulation. Gait limited by pain/fatigue.   -LR      Recorded by [LR] Solange Box, PT      Stairs Assessment/Treatment    Number of Stairs 5  -LR      Stairs, Handrail Location other (see comments)   2 single steps with RW; 3 steps SPC on L, HHA on R  -LR      Stairs, [place] Level verbal cues required;contact guard assist;2 person assist required  -LR      Stairs, Assistive Device walker  -LR      Stairs, Technique Used step to step (ascending);step to step (descending)  -LR      Stairs, Safety Issues sequencing ability decreased;weight-shifting ability decreased  -LR      Stairs, Impairments ROM decreased;strength decreased;pain  -LR      Stairs, Comment Patient initially confirmed she only had one step to enter home. Therefore, performed stair training for one step using RW for UE support and cues to step up backward with strong LE first and then down with weak LE first. Performed twice. Patient then stated she actually has 2 steps to enter home and they are not deep enough to place RW on them. Completed stair training with SPC in L hand and HHA on R. Verbal cues to step up with strong LE first and down with weak LE first. Cues for correct placement and sequencing of SPC.   -LR      Recorded by [LR] Solange Box, PT      Therapy Exercises    Exercise Protocols total hip  -LR      Total Hip Exercises right:;15 reps;completed protocol;with assist;ankle pumps/circles;quad set;glut set;heel slides;hip  abduction;SAQ;SLR;LAQ   cues for technique;min assist LAQ,SAQ,SLR,hip abd  -LR      Recorded by [LR] Solange Box, PT      Positioning and Restraints    Pre-Treatment Position sitting in chair/recliner  -LR      Post Treatment Position chair  -LR      In Chair notified nsg;reclined;sitting;call light within reach;encouraged to call for assist;with family/caregiver;legs elevated  -LR      Recorded by [LR] Solange Box, PT        User Key  (r) = Recorded By, (t) = Taken By, (c) = Cosigned By    Initials Name Effective Dates    LR Solange Box, PT 06/19/15 -           PT Recommendation and Plan  Anticipated Discharge Disposition: home with assist, home with outpatient services  Planned Therapy Interventions: balance training, bed mobility training, gait training, home exercise program, patient/family education, stair training, strengthening, transfer training  PT Frequency: 2 times/day  Plan of Care Review  Plan Of Care Reviewed With: patient, spouse, family  Progress: progress toward functional goals as expected  Outcome Summary/Follow up Plan: Patient ambulated 250 feet with step to gait pattern, limited by pain/fatigue. Progressed to stair training to simulate home environment. CGA for all mobility for safety. Patient has been d/c home with outpatient PT services. Needs ongoing reinforcement and clarification of hip precautions.           Outcome Measures       10/04/17 0914 10/04/17 0859 10/03/17 1334    How much help from another person do you currently need...    Turning from your back to your side while in flat bed without using bedrails? 3  -LR  3  -MJ    Moving from lying on back to sitting on the side of a flat bed without bedrails? 3  -LR  3  -MJ    Moving to and from a bed to a chair (including a wheelchair)? 3  -LR  3  -MJ    Standing up from a chair using your arms (e.g., wheelchair, bedside chair)? 3  -LR  3  -MJ    Climbing 3-5 steps with a railing? 3  -LR  3  -MJ    To walk  in hospital room? 3  -LR  3  -MJ    AM-PAC 6 Clicks Score 18  -LR  18  -MJ    How much help from another is currently needed...    Putting on and taking off regular lower body clothing?  3  -MC     Bathing (including washing, rinsing, and drying)  3  -MC     Toileting (which includes using toilet bed pan or urinal)  3  -MC     Putting on and taking off regular upper body clothing  4  -MC     Taking care of personal grooming (such as brushing teeth)  4  -MC     Eating meals  4  -MC     Score  21  -MC     Functional Assessment    Outcome Measure Options AM-PAC 6 Clicks Basic Mobility (PT)  -LR AM-PAC 6 Clicks Daily Activity (OT)  -MC AM-PAC 6 Clicks Basic Mobility (PT)  -MJ      User Key  (r) = Recorded By, (t) = Taken By, (c) = Cosigned By    Initials Name Provider Type    LR Solange Box, PT Physical Therapist    MC Talisha Calabrese, OT Occupational Therapist    SITA Sauer, PT Physical Therapist           Time Calculation:         PT Charges       10/04/17 1029          Time Calculation    Start Time 0914  -LR      PT Received On 10/04/17  -LR      PT Goal Re-Cert Due Date 10/13/17  -LR      Time Calculation- PT    Total Timed Code Minutes- PT 24 minute(s)  -LR        User Key  (r) = Recorded By, (t) = Taken By, (c) = Cosigned By    Initials Name Provider Type    TIFF Box, PT Physical Therapist          Therapy Charges for Today     Code Description Service Date Service Provider Modifiers Qty    73992975396 HC GAIT TRAINING EA 15 MIN 10/4/2017 Solange Box, PT GP 1    86520944347 HC PT THER PROC EA 15 MIN 10/4/2017 Solange Bxo, PT GP 1          PT G-Codes  Outcome Measure Options: AM-PAC 6 Clicks Basic Mobility (PT)    PT Discharge Summary  Anticipated Discharge Disposition: home with assist, home with outpatient services  Reason for Discharge: Discharge from facility  Outcomes Achieved: Patient able to partially acheive established goals  Discharge Destination: Home with  assist, Home with outpatient services    Solange Box, PT  10/4/2017

## 2017-10-04 NOTE — PLAN OF CARE
Problem: Patient Care Overview (Adult)  Goal: Plan of Care Review  Outcome: Ongoing (interventions implemented as appropriate)    10/04/17 1123   Coping/Psychosocial Response Interventions   Plan Of Care Reviewed With patient;spouse   Outcome Evaluation   Outcome Summary/Follow up Plan Patient requested PT return to review bed mobility and stair training. Issued leg  and educated on its use to assist movement of R LE during bed mobility and t/f. Completed stair training with RW. Patient has been d/c home with HHPT.    Patient Care Overview   Progress progress toward functional goals as expected

## 2017-10-04 NOTE — PLAN OF CARE
Problem: Patient Care Overview (Adult)  Goal: Plan of Care Review  Outcome: Ongoing (interventions implemented as appropriate)    10/04/17 0516   Coping/Psychosocial Response Interventions   Plan Of Care Reviewed With patient   Outcome Evaluation   Outcome Summary/Follow up Plan Patient minimal assist x 1 with ADLs. Demonstrates appropriate positioning and hip precautions.  at bedside, supportive. Current pain regimen is adequate for comfort.    Patient Care Overview   Progress progress toward functional goals as expected         Problem: Hip Replacement, Total (Adult)  Goal: Signs and Symptoms of Listed Potential Problems Will be Absent or Manageable (Hip Replacement, Total)  Outcome: Ongoing (interventions implemented as appropriate)    Problem: Pain, Acute (Adult)  Goal: Identify Related Risk Factors and Signs and Symptoms  Outcome: Ongoing (interventions implemented as appropriate)

## 2017-10-04 NOTE — PLAN OF CARE
Problem: Patient Care Overview (Adult)  Goal: Plan of Care Review  Outcome: Outcome(s) achieved Date Met:  10/04/17    10/04/17 0942   Coping/Psychosocial Response Interventions   Plan Of Care Reviewed With patient;spouse   Outcome Evaluation   Outcome Summary/Follow up Plan OT initial eval completed. Pt pending DC home this date with assist. Pt demonstrates/verbalizes good understanding of hip precautions when performing ADLs. All goals met, DC OT    Patient Care Overview   Progress improving         Problem: Inpatient Occupational Therapy  Goal: Transfer Training Goal 1 LTG- OT  Outcome: Outcome(s) achieved Date Met:  10/04/17    10/04/17 0942   Transfer Training OT LTG   Transfer Training OT LTG, Date Established 10/04/17   Transfer Training OT LTG, Time to Achieve by discharge   Transfer Training OT LTG, Activity Type sit to stand/stand to sit   Transfer Training OT LTG, [place] Level contact guard assist   Transfer Training OT LTG, Assist Device walker, rolling   Transfer Training OT LTG, Outcome goal met       Goal: Patient Education Goal LTG- OT  Outcome: Outcome(s) achieved Date Met:  10/04/17    10/04/17 0942   Patient Education OT LTG   Patient Education OT LTG, Date Established 10/04/17   Patient Education OT LTG, Time to Achieve by discharge   Patient Education OT LTG, Education Type precautions per surgeon;1 hand/tomás technique;home safety;adaptive equipment mgmt   Patient Education OT LTG, Education Understanding demonstrates adequately;verbalizes understanding   Patient Education OT LTG Outcome goal met       Goal: LB Dressing Goal LTG- OT  Outcome: Outcome(s) achieved Date Met:  10/04/17    10/04/17 0942   LB Dressing OT LTG   LB Dressing Goal OT LTG, Date Established 10/04/17   LB Dressing Goal OT LTG, Time to Achieve by discharge   LB Dressing Goal OT LTG, [place] Level supervision required   LB Dressing Goal OT LTG, Adaptive Equipment reacher;sock-aid   LB Dressing Goal OT LTG, Outcome  goal met

## 2017-10-04 NOTE — PROGRESS NOTES
"      [facility] [place] Orthopaedic Surgery Progress Note    Subjective      LOS: 1 day   Patient Care Team:  Regan Adams MD as PCP - General  Regan Adams MD as PCP - Family Medicine  Regan Adams MD as PCP - Claims Attributed    Interval History:   Resting comfortably in a chair.  Pain is controlled.    Objective      Vital Signs  Temp (24hrs), Av.3 °F (36.3 °C), Min:96.8 °F (36 °C), Max:97.7 °F (36.5 °C)      /59 (BP Location: Right arm, Patient Position: Lying)  Pulse 73  Temp 97 °F (36.1 °C) (Temporal Artery )   Resp 16  Ht 64.6\" (164.1 cm)  Wt 147 lb (66.7 kg)  SpO2 93%  BMI 24.77 kg/m2    Examination:   Examination of the right hip: The wound is clean, dry, and intact.  Thigh is soft and nontender.  Ankle dorsiflexion, ankle plantar flexion, and EHL are intact.  Sensation intact in the foot to light touch.    Labs:        Radiology:  Imaging Results (last 24 hours)     Procedure Component Value Units Date/Time    XR Hip With or Without Pelvis 2 - 3 View Right [[id number]] Collected:  10/03/17 1325     Updated:  10/03/17 1455    Narrative:       EXAMINATION: XR HIP W OR WO PELVIS, 2-3 VIEW, RIGHT-10/03/2017:      INDICATION: Postop right ESTHER.      COMPARISON: NONE.     FINDINGS: Imaging of the pelvis and 2 views of the right hip reveal  severe degenerative changes seen within the left hip. There are  degenerative changes seen within the sacroiliac joints bilaterally.  Prior deformity seen at the left superior and inferior pubic ramus from  prior healed injury. Hardware identified from total right hip  arthroplasty. Postsurgical changes seen within the soft tissues.       Impression:       Status post replacement of the right hip. No hardware  complication identified. Postsurgical changes seen in the soft tissues.     D:  10/03/2017  E:  10/03/2017     This report was finalized on 10/3/2017 2:53 PM by Dr. Laura Talley MD.             PT:  Gait, Distance (Feet): " 130     Results Review:     I reviewed the patient's new clinical results.    Assessment and Plan     Assessment:   Status post right total hip arthroplasty-doing well   CBC pending this morning.    Principal Problem:    Status post total replacement of right hip  Active Problems:    Hyperlipidemia    Arthritis of right hip      Plan for disposition: Plan for discharge to home today after physical therapy.  Follow-up with me in 3 weeks as planned.      Prem Hodges MD  10/04/17  8:12 AM

## 2017-10-04 NOTE — PLAN OF CARE
Problem: Patient Care Overview (Adult)  Goal: Plan of Care Review  Outcome: Ongoing (interventions implemented as appropriate)    10/04/17 0914   Coping/Psychosocial Response Interventions   Plan Of Care Reviewed With patient;spouse;family   Outcome Evaluation   Outcome Summary/Follow up Plan Patient ambulated 250 feet with step to gait pattern, limited by pain/fatigue. Progressed to stair training to simulate home environment. CGA for all mobility for safety. Patient has been d/c home with outpatient PT services. Needs ongoing reinforcement and clarification of hip precautions.    Patient Care Overview   Progress progress toward functional goals as expected         Problem: Hip Replacement, Total (Adult)  Goal: Signs and Symptoms of Listed Potential Problems Will be Absent or Manageable (Hip Replacement, Total)  Outcome: Ongoing (interventions implemented as appropriate)    10/04/17 0914   Hip Replacement, Total   Problems Assessed (Total Hip Replacement) pain;displacement of prosthesis;functional decline/self care deficit   Problems Present (Total Hip Replacement) pain;displacement of prosthesis;functional decline/self care deficit         Problem: Inpatient Physical Therapy  Goal: Bed Mobility Goal LTG- PT  Outcome: Unable to achieve outcome(s) by discharge Date Met:  10/04/17    10/04/17 0914   Bed Mobility PT LTG   Bed Mobility PT LTG, Date Established 10/03/17   Bed Mobility PT LTG, Time to Achieve 5 days   Bed Mobility PT LTG, Activity Type supine to sit/sit to supine   Bed Mobility PT LTG, [place] Level conditional independence   Bed Mobility PT LTG, Date Goal Reviewed 10/04/17   Bed Mobility PT LTG, Outcome goal not met   Bed Mobility PT LTG, Reason Goal Not Met discharged from facility       Goal: Transfer Training Goal 1 LTG- PT  Outcome: Unable to achieve outcome(s) by discharge Date Met:  10/04/17    10/04/17 0914   Transfer Training PT LTG   Transfer Training PT LTG, Date Established 10/03/17    Transfer Training PT LTG, Time to Achieve 5 days   Transfer Training PT LTG, Activity Type sit to stand/stand to sit   Transfer Training PT LTG, [place] Level conditional independence   Transfer Training PT LTG, Assist Device walker, rolling   Transfer Training PT LTG, Date Goal Reviewed 10/04/17   Transfer Training PT LTG, Outcome goal not met   Transfer Training PT LTG, Reason Goal Not Met discharged from facility       Goal: Gait Training Goal LTG- PT  Outcome: Unable to achieve outcome(s) by discharge Date Met:  10/04/17    10/04/17 0914   Gait Training PT LTG   Gait Training Goal PT LTG, Date Established 10/03/17   Gait Training Goal PT LTG, Time to Achieve 5 days   Gait Training Goal PT LTG, [place] Level conditional independence   Gait Training Goal PT LTG, Assist Device walker, rolling   Gait Training Goal PT LTG, Distance to Achieve 500 feet   Gait Training Goal PT LTG, Date Goal Reviewed 10/04/17   Gait Training Goal PT LTG, Outcome goal not met   Gait Training Goal PT LTG, Reason Goal Not Met discharged from facility       Goal: Stair Training Goal LTG- PT  Outcome: Outcome(s) achieved Date Met:  10/04/17    10/04/17 0914   Stair Training PT LTG   Stair Training Goal PT LTG, Date Established 10/03/17   Stair Training Goal PT LTG, Time to Achieve 5 days   Stair Training Goal PT LTG, Number of Steps 1   Stair Training Goal PT LTG, [place] Level contact guard assist   Stair Training Goal PT LTG, Assist Device walker, rolling  (backwards)   Stair Training Goal PT LTG, Date Goal Reviewed 10/04/17   Stair Training Goal PT LTG, Outcome goal met

## 2017-10-05 ENCOUNTER — TRANSITIONAL CARE MANAGEMENT TELEPHONE ENCOUNTER (OUTPATIENT)
Dept: FAMILY MEDICINE CLINIC | Facility: CLINIC | Age: 72
End: 2017-10-05

## 2017-10-05 ENCOUNTER — HOSPITAL ENCOUNTER (OUTPATIENT)
Dept: PHYSICAL THERAPY | Facility: HOSPITAL | Age: 72
Setting detail: THERAPIES SERIES
Discharge: HOME | End: 2017-10-05

## 2017-10-05 DIAGNOSIS — Z74.09 IMPAIRED FUNCTIONAL MOBILITY AND ACTIVITY TOLERANCE: Primary | ICD-10-CM

## 2017-10-05 PROCEDURE — 97162 PT EVAL MOD COMPLEX 30 MIN: CPT

## 2017-10-05 PROCEDURE — 97116 GAIT TRAINING THERAPY: CPT

## 2017-10-05 PROCEDURE — G8978 MOBILITY CURRENT STATUS: HCPCS

## 2017-10-05 PROCEDURE — 97110 THERAPEUTIC EXERCISES: CPT

## 2017-10-05 PROCEDURE — G8979 MOBILITY GOAL STATUS: HCPCS

## 2017-10-05 NOTE — OUTREACH NOTE
LEONID call completed.  Please refer to TCM call flowsheet for call documentation.  Patient declined appointment.

## 2017-10-06 NOTE — THERAPY EVALUATION
Outpatient Physical Therapy Ortho Initial Evaluation   [facility]     Patient Name: Jacques Yeboah  : 1945  MRN: [id number]  Today's Date: 10/6/2017      Visit Date: 10/05/2017    Patient Active Problem List   Diagnosis   • Hyperlipidemia   • Arthritis of right hip   • Status post total replacement of right hip   • Acute blood loss anemia, mild, asymptomatic        Past Medical History:   Diagnosis Date   • Arthritis    • H/O bone density study    • Hip pain    • History of colonoscopy    • History of mammogram    • Impacted cerumen of right ear     Ears needs rinsed out, feels clogged two days.    • Inflammatory arthritis    • Lumbago    • Lumbar degenerative disc disease    • Metatarsalgia, left foot    • Osteoarthritis of knees, bilateral    • Pelvic fracture    • Primary osteoarthritis of both hips    • Sciatica    • Thoracic spondylosis    • Wears glasses         Past Surgical History:   Procedure Laterality Date   • COLONOSCOPY     • EYE SURGERY     • FRACTURE SURGERY      left wrist; pelvis   • TONSILLECTOMY     • TOTAL HIP ARTHROPLASTY Right 10/3/2017    Procedure: TOTAL HIP ARTHROPLASTY RIGHT;  Surgeon: Prem Hodges MD;  Location: [facility];  Service:    • TUBAL ABDOMINAL LIGATION         Visit Dx:     ICD-10-CM ICD-9-CM   1. Impaired functional mobility and activity tolerance Z74.09 V49.89                 PT Ortho       10/05/17 1430    Subjective Comments    Subjective Comments Pt c/o weakness and difficulty moving R Leg  -BD    Precautions and Contraindications    Precautions/Limitations hip precautions- right  -BD    Precautions WBAT  -BD    Subjective Pain    Able to rate subjective pain? yes  -BD    Pre-Treatment Pain Level 3  -BD    Post-Treatment Pain Level 3  -BD    Pain Assessment    Pain Assessment 0-10  -BD    Pain Score 3  -BD    Post Pain Score 3  -BD    Pain Intervention(s) Repositioned;Ambulation/increased activity;Emotional support  -BD    Response to Interventions  tolerated  -BD    Multiple Pain Sites Yes   back pain, chronic  -BD    Posture/Observations    Posture- WNL Posture is WNL  -BD    ROM (Range of Motion)    General ROM lower extremity range of motion deficits identified  -    General ROM Detail Decreased R hip flexion, extension, abduction  -    General LE Assessment    ROM hip, right: LE ROM deficit  -BD    MMT (Manual Muscle Testing)    General MMT Assessment lower extremity strength deficits identified;upper extremity strength deficits identified  -    Upper Extremity    Upper Ext Manual Muscle Testing --   Functional strength but general weakness Bilateral UE's 4-/5  -BD    Lower Extremity    Lower Ext Manual Muscle Testing --   Functional for assisted gt and transfers.   -    Balance Skills Training    Sitting-Level of Assistance Independent  -BD    Sitting-Balance Support Feet supported;No upper extremity supported  -    Standing-Level of Assistance Close supervision  -    Static Standing Balance Support assistive device  -    Standing-Balance Activities Weight Shift A-P;Weight Shift R-L  -    Standing Balance # of Minutes 5  -    Gait Balance-Level of Assistance Contact guard  -    Gait Balance Support assistive device  -    Gait Balance Activities uneven surface  -    Transfers    Transfers, Sit-Stand [place] supervision required;conditional independence;verbal cues required  -BD    Transfers, Stand-Sit [place] conditional independence;supervision required;other (see comments)   Assistive device, R walker  -BD    Transfer, Maintain Weight Bearing Status able to maintain weight bearing status  -    Gait Assessment/Treatment    Gait, [place] Level contact guard assist;verbal cues required  -    Gait, Assistive Device rolling walker  -    Gait, Distance (Feet) 150  -    Gait, Gait Pattern Analysis swing-through gait  -    Gait, Gait Deviations right:;decreased heel strike;step length decreased  -    Gait,  Maintain Weight Bearing Status able to maintain weight bearing status  -BD    Gait, Safety Issues step length decreased  -BD    Gait, Impairments pain  -BD    Gait, Comment PT using R walker well in her home. Stand by to contact guard Assist recommended for safety.  -BD      User Key  (r) = Recorded By, (t) = Taken By, (c) = Cosigned By    Initials Name Provider Type    VLADISLAV Lr, PT Physical Therapist                            Therapy Education       10/06/17 1352          Therapy Education    Education Details PT program strategies and plan discussed with pt and her . Exercises reviewed for HEP.  instructed on how to assist with hip abduction exercises. Ice use after exercises recommended and discussed best use. Pt encouraged.  -BD      Given HEP;Pain management;Posture/body mechanics;Fall prevention and home safety;Mobility training  -BD      Program New  -BD      How Provided Verbal;Demonstration;Written  -BD      Provided to Patient;Caregiver  -BD      Level of Understanding Verbalized;Demonstrated  -BD        User Key  (r) = Recorded By, (t) = Taken By, (c) = Cosigned By    Initials Name Provider Type    VLADISLAV Lr, MICHEAL Physical Therapist                PT OP Goals       10/05/17 1430       PT Short Term Goals    STG Date to Achieve 10/20/17  -BD     STG 1 Indep HEP for mobility  -BD     STG 2 Ambulate with AD for household distances, Indep.  -BD     STG 3 Hip abduction 30 degrees, exercise independent for hip abduction reps.  -BD     STG 4 independent all bed mobility  -BD     Long Term Goals    LTG Date to Achieve 11/01/17  -BD     LTG 1 Independent with progressed HEP  -BD     LTG 2 sit to stand with symmetrical wt bearing.  -BD     LTG 3 Independent with stairs for entrance and exit of home.  -BD     LTG 4 Hip abduction and flexion active in functional range.  -BD     Time Calculation    PT Goal Re-Cert Due Date 01/05/18  -BD       User Key  (r) = Recorded By, (t) =  Taken By, (c) = Cosigned By    Initials Name Provider Type    VLADISLAV Lr PT Physical Therapist                PT Assessment/Plan       10/05/17 1430       PT Assessment    Functional Limitations Impaired gait;Impaired locomotion;Performance in leisure activities;Limitation in home management;Limitations in community activities;Limitations in functional capacity and performance  -BD     Impairments Impaired muscle power;Range of motion;Impaired flexibility;Locomotion;Endurance;Gait;Muscle strength;Pain  -BD     Please refer to paper survey for additional self-reported information No  -BD     Rehab Potential Good  -BD     Patient would benefit from skilled therapy intervention Yes  -BD     PT Plan    PT Frequency 2x/week  -BD     Predicted Duration of Therapy Intervention (days/wks) 2 to 6 weeks   -BD     Planned CPT's? PT EVAL MOD COMPLELITY: 00647;PT GAIT TRAINING EA 15 MIN: 12349;PT THER PROC EA 15 MIN: 96216  -BD     Physical Therapy Interventions (Optional Details) ROM (Range of Motion);bed mobility training;manual therapy techniques;strengthening;stretching;gait training;home exercise program;patient/family education  -BD       User Key  (r) = Recorded By, (t) = Taken By, (c) = Cosigned By    Initials Name Provider Type    VLADISLAV Lr, PT Physical Therapist                  Exercises       10/05/17 1430          Subjective Comments    Subjective Comments Pt c/o weakness and difficulty moving R Leg  -BD      Subjective Pain    Able to rate subjective pain? yes  -BD      Pre-Treatment Pain Level 3  -BD      Post-Treatment Pain Level 3  -BD      Exercise 1    Additional Comments See paper list in pt chart.  -BD        User Key  (r) = Recorded By, (t) = Taken By, (c) = Cosigned By    Initials Name Provider Type    VLADISLAV Lr PT Physical Therapist                              Outcome Measures       10/06/17 1300 10/05/17 1430       Lower Extremity Functional Index    Any of your  usual work, housework or school activities  0  -BD     Your usual hobbies, recreational or sporting activities  0  -BD     Getting into or out of the bath  0  -BD     Walking between rooms  3  -BD     Putting on your shoes or socks  0  -BD     Squatting  0  -BD     Lifting an object, like a bag of groceries from the floor  0  -BD     Performing light activities around your home  1  -BD     Performing heavy activities around your home  0  -BD     Getting into or out of a car  1  -BD     Walking 2 blocks  0  -BD     Walking a mile  0  -BD     Going up or down 10 stairs (about 1 flight of stairs)  0  -BD     Standing for 1 hour  0  -BD     Sitting for 1 hour  4  -BD     Running on even ground  0  -BD     Running on uneven ground  0  -BD     Making sharp turns while running fast  0  -BD     Hopping  0  -BD     Rolling over in bed  1  -BD     Total  10  -BD     Functional Assessment    Outcome Measure Options --  -BD Lower Extremity Functional Scale (LEFS)  -BD       User Key  (r) = Recorded By, (t) = Taken By, (c) = Cosigned By    Initials Name Provider Type    BD Lizzette Lr PT Physical Therapist            Time Calculation:   Start Time: 1430  Total Timed Code Minutes- PT: 45 minute(s)     Therapy Charges for Today     Code Description Service Date Service Provider Modifiers Qty    91822555030 HC GAIT TRAINING EA 15 MIN 10/5/2017 Lizzette Lr, PT GP 1    92287093983 HC PT EVAL MOD COMPLEXITY 2 10/5/2017 Lizzette Lr PT GP 1    72794651402 HC PT THER PROC EA 15 MIN 10/5/2017 Lizzette Lr PT GP 2    63008974966 HC PT MOBILITY CURRENT 10/5/2017 Lizzette Lr PT GP, CM 1    49367093945 HC PT MOBILITY PROJECTED 10/5/2017 Lizzette Lr PT GP, CL 1          PT G-Codes  Outcome Measure Options: Lower Extremity Functional Scale (LEFS)  Score: 10  Functional Limitation: Mobility: Walking and moving around  Mobility: Walking and Moving Around Current Status (): At least 80 percent but  less than 100 percent impaired, limited or restricted  Mobility: Walking and Moving Around Goal Status (): At least 60 percent but less than 80 percent impaired, limited or restricted         Lizzette Lr, PT  10/6/2017

## 2017-10-10 ENCOUNTER — HOSPITAL ENCOUNTER (OUTPATIENT)
Dept: PHYSICAL THERAPY | Facility: HOSPITAL | Age: 72
Setting detail: THERAPIES SERIES
Discharge: HOME | End: 2017-10-10

## 2017-10-10 PROCEDURE — 97116 GAIT TRAINING THERAPY: CPT

## 2017-10-10 PROCEDURE — 97110 THERAPEUTIC EXERCISES: CPT

## 2017-10-10 NOTE — THERAPY TREATMENT NOTE
Outpatient Physical Therapy Ortho Treatment Note   [place]     Patient Name: Jacques Yeboah  : 1945  MRN: [id number]  Today's Date: 10/10/2017      Visit Date: 10/10/2017    Visit Dx:  No diagnosis found.    Patient Active Problem List   Diagnosis   • Hyperlipidemia   • Arthritis of right hip   • Status post total replacement of right hip   • Acute blood loss anemia, mild, asymptomatic        Past Medical History:   Diagnosis Date   • Arthritis    • H/O bone density study    • Hip pain    • History of colonoscopy    • History of mammogram    • Impacted cerumen of right ear     Ears needs rinsed out, feels clogged two days.    • Inflammatory arthritis    • Lumbago    • Lumbar degenerative disc disease    • Metatarsalgia, left foot    • Osteoarthritis of knees, bilateral    • Pelvic fracture    • Primary osteoarthritis of both hips    • Sciatica    • Thoracic spondylosis    • Wears glasses         Past Surgical History:   Procedure Laterality Date   • COLONOSCOPY     • EYE SURGERY     • FRACTURE SURGERY      left wrist; pelvis   • TONSILLECTOMY     • TOTAL HIP ARTHROPLASTY Right 10/3/2017    Procedure: TOTAL HIP ARTHROPLASTY RIGHT;  Surgeon: Prem Hodges MD;  Location: [facility];  Service:    • TUBAL ABDOMINAL LIGATION               PT Ortho       10/10/17 1015    Subjective Comments    Subjective Comments Pt states she is moving better, more strength in general  -BD    Precautions and Contraindications    Precautions/Limitations hip precautions- right  -BD    Precautions WBAT R hip  -BD    Subjective Pain    Able to rate subjective pain? yes  -BD    Pre-Treatment Pain Level 0  -BD    Post-Treatment Pain Level 4  -BD    Pain Assessment    Pain Assessment 0-10  -BD    Pain Score 0  -BD    Post Pain Score 4  -BD    Pain Intervention(s) Repositioned;Ambulation/increased activity  -BD    Response to Interventions tolerated  -BD    Posture/Observations    Posture- WNL Posture is WNL  -BD    Balance  Skills Training    Sitting-Level of Assistance Independent  -BD    Sitting-Balance Support Feet supported  -BD    Standing-Level of Assistance Distant supervision  -BD    Static Standing Balance Support assistive device  -BD    Standing-Balance Activities Weight Shift A-P;Weight Shift R-L  -BD    Standing Balance # of Minutes 10  -BD    Gait Balance-Level of Assistance Distant supervision  -BD    Gait Balance Support assistive device  -BD    Gait Balance Activities side-stepping  -BD    Transfers    Transfers, Sit-Stand [place] conditional independence  -BD    Transfers, Stand-Sit [place] independent;set up required  -BD    Transfers, Sit-Stand-Sit, Assist Device rolling walker  -BD    Transfer, Maintain Weight Bearing Status assist to maintain weight bearing status  -BD    Gait Assessment/Treatment    Gait, [place] Level independent  -BD    Gait, Assistive Device rolling walker  -BD    Gait, Distance (Feet) 150  -BD    Gait, Gait Pattern Analysis swing-through gait  -BD    Gait, Gait Deviations right:;step length decreased  -BD    Gait, Safety Issues step length decreased  -BD    Gait, Impairments strength decreased  -BD      User Key  (r) = Recorded By, (t) = Taken By, (c) = Cosigned By    Initials Name Provider Type    BD Lizzette Lr, PT Physical Therapist                            PT Assessment/Plan       10/10/17 1320       PT Assessment    Functional Limitations Impaired gait;Performance in leisure activities;Impaired locomotion;Limitation in home management  -BD     Impairments Impaired muscle length;Impaired muscle power;Range of motion;Impaired flexibility;Muscle strength;Impaired muscle endurance;Pain  -BD     Assessment Comments PT working well with HEP and progressing with exercises. Pt's  present to assist as needed today. 2 new exercises added. Pt improving to I with bed mobility.  -BD     Please refer to paper survey for additional self-reported information No  -BD      Rehab Potential Excellent  -BD     Patient/caregiver participated in establishment of treatment plan and goals Yes  -BD     Patient would benefit from skilled therapy intervention Yes  -BD     PT Plan    PT Frequency 2x/week  -BD     Predicted Duration of Therapy Intervention (days/wks) 2 ti 3 weeks  -BD     Planned CPT's? PT GAIT TRAINING EA 15 MIN: 71431;PT THER PROC GROUP: 22156  -BD     Physical Therapy Interventions (Optional Details) ROM (Range of Motion);bed mobility training;manual therapy techniques;strengthening;stretching;gait training;home exercise program;patient/family education;neuromuscular re-education  -BD     PT Plan Comments Continue with HEP 3 x/day and PT visits 2 x/week progressing exercises and gait training.  -BD       User Key  (r) = Recorded By, (t) = Taken By, (c) = Cosigned By    Initials Name Provider Type    VLADISLAV Lr, PT Physical Therapist                    Exercises       10/10/17 1015          Subjective Comments    Subjective Comments Pt states she is moving better, more strength in general  -BD      Subjective Pain    Able to rate subjective pain? yes  -BD      Pre-Treatment Pain Level 0  -BD      Post-Treatment Pain Level 4  -BD      Exercise 1    Additional Comments See paper list of exercises in pt chart  -BD        User Key  (r) = Recorded By, (t) = Taken By, (c) = Cosigned By    Initials Name Provider Type    VLADISLAV Lr, PT Physical Therapist                               PT OP Goals       10/10/17 1325       Time Calculation    PT Goal Re-Cert Due Date 01/05/17  -BD       User Key  (r) = Recorded By, (t) = Taken By, (c) = Cosigned By    Initials Name Provider Type    VLADISLAV Lr, PT Physical Therapist                Therapy Education       10/10/17 1319          Therapy Education    Education Details PT explained exercise technique on several exercises. HEP progressed adding 2 new exercises.   -BD      Given HEP;Pain management;Edema  management;Mobility training;Posture/body mechanics  -BD      Program Reinforced  -BD      How Provided Verbal;Demonstration;Written  -BD      Provided to Patient;Caregiver  -BD      Level of Understanding Verbalized;Demonstrated  -BD        User Key  (r) = Recorded By, (t) = Taken By, (c) = Cosigned By    Initials Name Provider Type    BD Lizzette Lr, PT Physical Therapist                Time Calculation:   Start Time: 1015  Total Timed Code Minutes- PT: 60 minute(s)    Therapy Charges for Today     Code Description Service Date Service Provider Modifiers Qty    51063019080 HC GAIT TRAINING EA 15 MIN 10/10/2017 Lizzette Lr, PT GP 1    47382099685 HC PT THER PROC EA 15 MIN 10/10/2017 Lizzette Lr, PT GP 3                    Lizzette Lr, PT  10/10/2017

## 2017-10-13 ENCOUNTER — HOSPITAL ENCOUNTER (OUTPATIENT)
Dept: PHYSICAL THERAPY | Facility: HOSPITAL | Age: 72
Setting detail: THERAPIES SERIES
Discharge: HOME | End: 2017-10-13

## 2017-10-13 PROCEDURE — 97116 GAIT TRAINING THERAPY: CPT

## 2017-10-13 PROCEDURE — 97110 THERAPEUTIC EXERCISES: CPT

## 2017-10-13 NOTE — THERAPY TREATMENT NOTE
Outpatient Physical Therapy Ortho Treatment Note   [place]     Patient Name: Jacques Yeboah  : 1945  MRN: [id number]  Today's Date: 10/13/2017      Visit Date: 10/13/2017    Visit Dx:  No diagnosis found.    Patient Active Problem List   Diagnosis   • Hyperlipidemia   • Arthritis of right hip   • Status post total replacement of right hip   • Acute blood loss anemia, mild, asymptomatic        Past Medical History:   Diagnosis Date   • Arthritis    • H/O bone density study    • Hip pain    • History of colonoscopy    • History of mammogram    • Impacted cerumen of right ear     Ears needs rinsed out, feels clogged two days.    • Inflammatory arthritis    • Lumbago    • Lumbar degenerative disc disease    • Metatarsalgia, left foot    • Osteoarthritis of knees, bilateral    • Pelvic fracture    • Primary osteoarthritis of both hips    • Sciatica    • Thoracic spondylosis    • Wears glasses         Past Surgical History:   Procedure Laterality Date   • COLONOSCOPY     • EYE SURGERY     • FRACTURE SURGERY      left wrist; pelvis   • TONSILLECTOMY     • TOTAL HIP ARTHROPLASTY Right 10/3/2017    Procedure: TOTAL HIP ARTHROPLASTY RIGHT;  Surgeon: Prem Hodges MD;  Location: [facility];  Service:    • TUBAL ABDOMINAL LIGATION               PT Ortho       10/13/17 1045    Subjective Comments    Subjective Comments Pt states she has had increased pain and spasms since decreasing pain medicine. Pt instructed to call ortho office if she continues to have concerns.  -BD    Precautions and Contraindications    Precautions/Limitations hip precautions- left  -BD    Subjective Pain    Able to rate subjective pain? yes  -BD    Pre-Treatment Pain Level 2  -BD    Post-Treatment Pain Level 1  -BD    Pain Assessment    Pain Assessment [place]une[place] FACES  -BD    Pain Score 2  -BD    Post Pain Score 2  -BD    Pain Location Hip  -BD    Pain Orientation Right  -BD    Pain Intervention(s) Repositioned;Ambulation/increased  activity;Cold applied  -BD    Response to Interventions tolerated  -BD    Balance Skills Training    Sitting-Level of Assistance Independent  -BD    Sitting-Balance Support Feet supported  -BD    Sitting-Balance Activities Reaching for objects  -BD    Standing-Level of Assistance Distant supervision  -BD    Static Standing Balance Support assistive device  -BD    Standing-Balance Activities Weight Shift A-P;Weight Shift R-L  -BD    Standing Balance # of Minutes 12  -BD    Gait Balance-Level of Assistance Distant supervision  -BD    Gait Balance Support assistive device  -BD    Gait Balance Activities side-stepping  -BD    Gait Assessment/Treatment    Gait, [place] Level independent  -BD    Gait, Assistive Device rolling walker  -BD    Gait, Distance (Feet) 150  -BD    Gait, Gait Pattern Analysis swing-through gait  -BD    Gait, Gait Deviations right:;step length decreased  -BD    Gait, Safety Issues step length decreased  -BD    Gait, Impairments strength decreased  -BD      User Key  (r) = Recorded By, (t) = Taken By, (c) = Cosigned By    Initials Name Provider Type     Lizzette Lr, PT Physical Therapist                            PT Assessment/Plan       10/13/17 1248       PT Assessment    Functional Limitations Impaired gait;Performance in leisure activities;Impaired locomotion;Limitation in home management  -BD     Impairments Impaired muscle length;Impaired muscle power;Gait;Pain;Range of motion;Impaired muscle endurance  -BD     Assessment Comments Pt is progressing with muscle strength and active ROM. She is independent with bed mobility and ambulation with rolling walker. Pain management is ongoing.  -BD     Please refer to paper survey for additional self-reported information No  -BD     Rehab Potential Excellent  -BD     Patient/caregiver participated in establishment of treatment plan and goals Yes  -BD     Patient would benefit from skilled therapy intervention Yes  -BD     PT Plan    PT  Frequency 2x/week  -BD     Predicted Duration of Therapy Intervention (days/wks) 2 - 4 weeks  -BD     Planned CPT's? PT GAIT TRAINING EA 15 MIN: 23268;PT THER PROC EA 15 MIN: 12944  -BD     Physical Therapy Interventions (Optional Details) patient/family education;bed mobility training;home exercise program;ROM (Range of Motion);strengthening;stretching  -BD     PT Plan Comments Pt to continue with HEP and Fast Track PT visits 2 x/week.  -BD       User Key  (r) = Recorded By, (t) = Taken By, (c) = Cosigned By    Initials Name Provider Type    VLADISLAV Lr, PT Physical Therapist                    Exercises       10/13/17 1045          Subjective Comments    Subjective Comments Pt states she has had increased pain and spasms since decreasing pain medicine. Pt instructed to call ortho office if she continues to have concerns.  -BD      Subjective Pain    Able to rate subjective pain? yes  -BD      Pre-Treatment Pain Level 2  -BD      Post-Treatment Pain Level 1  -BD      Exercise 1    Additional Comments see paper list in pt chart  -BD        User Key  (r) = Recorded By, (t) = Taken By, (c) = Cosigned By    Initials Name Provider Type    VLADISLAV Lr, PT Physical Therapist                               PT OP Goals       10/13/17 1255       Time Calculation    PT Goal Re-Cert Due Date 01/05/18  -BD       User Key  (r) = Recorded By, (t) = Taken By, (c) = Cosigned By    Initials Name Provider Type    VLADISLAV Lr, PT Physical Therapist                Therapy Education       10/13/17 1244          Therapy Education    Education Details PT explained one new exercise and reviewed current HEP, also discussed paiin management and use of ice. Pt encouraged to call orthopedic office to get information on transitioning off prescription pain medicine.  -BD      Given Posture/body mechanics;HEP;Pain management;Mobility training  -BD      Program Progressed  -BD      How Provided Verbal;Demonstration   -BD      Provided to Patient  -BD      Level of Understanding Verbalized;Demonstrated  -BD        User Key  (r) = Recorded By, (t) = Taken By, (c) = Cosigned By    Initials Name Provider Type    VLADISLAV Lr, PT Physical Therapist                Time Calculation:   Start Time: 1045  Total Timed Code Minutes- PT: 60 minute(s)    Therapy Charges for Today     Code Description Service Date Service Provider Modifiers Qty    94514952286  GAIT TRAINING EA 15 MIN 10/13/2017 Lizzette Lr, PT GP 1    [id number]  PT THER PROC EA 15 MIN 10/13/2017 Lizzette Lr, PT GP 1                    Lizzette Lr, PT  10/13/2017

## 2017-10-16 ENCOUNTER — TELEPHONE (OUTPATIENT)
Dept: ORTHOPEDIC SURGERY | Facility: CLINIC | Age: 72
End: 2017-10-16

## 2017-10-16 NOTE — TELEPHONE ENCOUNTER
Pt has been having spasms in here knee & groin. She had been taking Tylenol and Hydrocodone.  She is down to 5 Hydrocodone pills.  She spoke with Dr Choudhury last night and he told her to take Aleve last night and this morning.  She is still taking an Aspirin daily per PO orders.   Her spasms are mainly in the afternoon after she does her exercises.  Is it ok for her to take Aleve in am and pm?   Then she can take the Hydrocodone before bed.    Janina

## 2017-10-16 NOTE — TELEPHONE ENCOUNTER
Pt would like 10 more of the Hydrocodone and her  can pick that up tomorrow. ( His name is Arsh)    Janina

## 2017-10-16 NOTE — TELEPHONE ENCOUNTER
PATIENT IS HAVING SOME RIGHT HIP SPASMS AND HAS SOME QUESTIONS REGARDING HER MEDICATION PLEASE CALL AND ADVISE [phone] THANK YOU

## 2017-10-17 ENCOUNTER — HOSPITAL ENCOUNTER (OUTPATIENT)
Dept: PHYSICAL THERAPY | Facility: HOSPITAL | Age: 72
Setting detail: THERAPIES SERIES
Discharge: HOME | End: 2017-10-17

## 2017-10-17 PROCEDURE — 97116 GAIT TRAINING THERAPY: CPT

## 2017-10-17 PROCEDURE — 97530 THERAPEUTIC ACTIVITIES: CPT

## 2017-10-17 RX ORDER — HYDROCODONE BITARTRATE AND ACETAMINOPHEN 7.5; 325 MG/1; MG/1
1 TABLET ORAL EVERY 6 HOURS PRN
Qty: 10 TABLET | Refills: 0 | Status: SHIPPED | OUTPATIENT
Start: 2017-10-17 | End: 2017-11-14

## 2017-10-17 RX ORDER — HYDROCODONE BITARTRATE AND ACETAMINOPHEN 7.5; 325 MG/1; MG/1
1 TABLET ORAL EVERY 6 HOURS PRN
Qty: 10 TABLET | Refills: 0 | Status: CANCELLED | OUTPATIENT
Start: 2017-10-17

## 2017-10-17 NOTE — THERAPY TREATMENT NOTE
Outpatient Physical Therapy Ortho Treatment Note   [place]     Patient Name: Jacques Yeboah  : 1945  MRN: [id number]  Today's Date: 10/17/2017      Visit Date: 10/17/2017    Visit Dx:  No diagnosis found.    Patient Active Problem List   Diagnosis   • Hyperlipidemia   • Arthritis of right hip   • Status post total replacement of right hip   • Acute blood loss anemia, mild, asymptomatic        Past Medical History:   Diagnosis Date   • Arthritis    • H/O bone density study    • Hip pain    • History of colonoscopy    • History of mammogram    • Impacted cerumen of right ear     Ears needs rinsed out, feels clogged two days.    • Inflammatory arthritis    • Lumbago    • Lumbar degenerative disc disease    • Metatarsalgia, left foot    • Osteoarthritis of knees, bilateral    • Pelvic fracture    • Primary osteoarthritis of both hips    • Sciatica    • Thoracic spondylosis    • Wears glasses         Past Surgical History:   Procedure Laterality Date   • COLONOSCOPY     • EYE SURGERY     • FRACTURE SURGERY      left wrist; pelvis   • TONSILLECTOMY     • TOTAL HIP ARTHROPLASTY Right 10/3/2017    Procedure: TOTAL HIP ARTHROPLASTY RIGHT;  Surgeon: Prem Hodges MD;  Location: [facility];  Service:    • TUBAL ABDOMINAL LIGATION               PT Ortho       10/17/17 1045    Subjective Comments    Subjective Comments Pt states she had muscle spasms over the weekend and increased pain during that time. She contacted the orthopedic office about concerns with pain medicine for increased pain and spasms. She states she has been much better this Monday and Tuesday.  -BD    Precautions and Contraindications    Precautions/Limitations hip precautions- right  -BD    Subjective Pain    Able to rate subjective pain? yes  -BD    Pre-Treatment Pain Level 0  -BD    Post-Treatment Pain Level 0  -BD    Pain Assessment    Pain Assessment No/denies pain  -BD    Balance Skills Training    Standing-Level of Assistance  Distant supervision  -BD    Static Standing Balance Support assistive device  -BD    Standing-Balance Activities Weight Shift A-P;Weight Shift R-L  -BD    Standing Balance # of Minutes 15   not at one tome, but total  -BD    Gait Balance-Level of Assistance Distant supervision  -BD    Gait Balance Support assistive device  -BD    Gait Balance Activities side-stepping  -BD    Transfers    Transfers, Sit-Stand [place] independent  -BD    Transfers, Stand-Sit [place] independent  -BD    Transfers, Sit-Stand-Sit, Assist Device rolling walker  -BD    Gait Assessment/Treatment    Gait, [place] Level independent;stand by assist  -BD    Gait, Assistive Device straight cane  -BD    Gait, Distance (Feet) 250  -BD    Gait, Gait Pattern Analysis swing-to gait  -BD    Gait, Gait Deviations dionisio decreased  -BD    Gait, Safety Issues step length decreased  -BD    Gait, Impairments strength decreased   low confidence  -BD    Gait, Comment Pt tried cane, first time since surgery. Good for prep for down the road.  -BD      User Key  (r) = Recorded By, (t) = Taken By, (c) = Cosigned By    Initials Name Provider Type    BD Lizzette Lr, PT Physical Therapist                            PT Assessment/Plan       10/17/17 1238       PT Assessment    Functional Limitations Impaired gait;Performance in leisure activities;Limitation in home management  -BD     Impairments Muscle strength;Gait;Range of motion;Impaired muscle endurance;Impaired flexibility;Impaired muscle power  -BD     Assessment Comments Pt improving general strength and movement and progressed to using straight cane during therapy. Bed mobility independent. Pt's bathroom adapted with additional walker at toilet for assist with transfers to and from toilet.  -BD     Please refer to paper survey for additional self-reported information No  -BD     Rehab Potential Excellent  -BD     Patient/caregiver participated in establishment of treatment plan and  goals Yes  -BD     Patient would benefit from skilled therapy intervention Yes  -BD     PT Plan    PT Frequency 2x/week  -BD     Planned CPT's? PT THER PROC EA 15 MIN: 81480;PT GAIT TRAINING EA 15 MIN: 31848  -BD     Physical Therapy Interventions (Optional Details) strengthening;gait training;home exercise program;patient/family education;stretching;ROM (Range of Motion);bed mobility training  -BD     PT Plan Comments Pt to continue with HEP per Fast Track program, PT visits 2 x/week progressing pt toward increased independence and function.  -BD       User Key  (r) = Recorded By, (t) = Taken By, (c) = Cosigned By    Initials Name Provider Type    VLADISLAV Lr, PT Physical Therapist                    Exercises       10/17/17 1045          Subjective Comments    Subjective Comments Pt states she had muscle spasms over the weekend and increased pain during that time. She contacted the orthopedic office about concerns with pain medicine for increased pain and spasms. She states she has been much better this Monday and Tuesday.  -BD      Subjective Pain    Able to rate subjective pain? yes  -BD      Pre-Treatment Pain Level 0  -BD      Post-Treatment Pain Level 0  -BD      Exercise 1    Additional Comments see paper list in pt chart  -BD        User Key  (r) = Recorded By, (t) = Taken By, (c) = Cosigned By    Initials Name Provider Type    VLADISLAV Lr, PT Physical Therapist                                   Therapy Education       10/17/17 1236          Therapy Education    Education Details Pt instructed on using straight cane for ambulation. Pt had review of all exericises on HEP. Caregiver present also,  -BD      Given HEP;Posture/body mechanics;Mobility training  -BD      Program Progressed  -BD      How Provided Verbal;Demonstration  -BD      Provided to Patient;Caregiver  -BD      Level of Understanding Verbalized;Demonstrated  -BD        User Key  (r) = Recorded By, (t) = Taken By, (c) =  Cosigned By    Initials Name Provider Type    BD Lizzette Lr, PT Physical Therapist                Time Calculation:        Therapy Charges for Today     Code Description Service Date Service Provider Modifiers Qty    98084440032 HC GAIT TRAINING EA 15 MIN 10/17/2017 Lizzette Lr, PT GP 2    [id number]  PT THERAPEUTIC ACT EA 15 MIN 10/17/2017 Lizzette Lr, PT GP 2                    Lizzette Lr, PT  10/17/2017

## 2017-10-19 ENCOUNTER — HOSPITAL ENCOUNTER (OUTPATIENT)
Dept: PHYSICAL THERAPY | Facility: HOSPITAL | Age: 72
Setting detail: THERAPIES SERIES
Discharge: HOME | End: 2017-10-19

## 2017-10-19 PROCEDURE — 97110 THERAPEUTIC EXERCISES: CPT

## 2017-10-19 PROCEDURE — 97116 GAIT TRAINING THERAPY: CPT

## 2017-10-19 NOTE — THERAPY TREATMENT NOTE
Outpatient Physical Therapy Ortho Treatment Note   [place]     Patient Name: Jacques Yeboah  : 1945  MRN: [id number]  Today's Date: 10/19/2017      Visit Date: 10/19/2017    Visit Dx:  No diagnosis found.    Patient Active Problem List   Diagnosis   • Hyperlipidemia   • Arthritis of right hip   • Status post total replacement of right hip   • Acute blood loss anemia, mild, asymptomatic        Past Medical History:   Diagnosis Date   • Arthritis    • H/O bone density study    • Hip pain    • History of colonoscopy    • History of mammogram    • Impacted cerumen of right ear     Ears needs rinsed out, feels clogged two days.    • Inflammatory arthritis    • Lumbago    • Lumbar degenerative disc disease    • Metatarsalgia, left foot    • Osteoarthritis of knees, bilateral    • Pelvic fracture    • Primary osteoarthritis of both hips    • Sciatica    • Thoracic spondylosis    • Wears glasses         Past Surgical History:   Procedure Laterality Date   • COLONOSCOPY     • EYE SURGERY     • FRACTURE SURGERY      left wrist; pelvis   • TONSILLECTOMY     • TOTAL HIP ARTHROPLASTY Right 10/3/2017    Procedure: TOTAL HIP ARTHROPLASTY RIGHT;  Surgeon: Prem Hodges MD;  Location: [facility];  Service:    • TUBAL ABDOMINAL LIGATION               PT Ortho       10/19/17 1350    Subjective Comments    Subjective Comments Pt c/o having spasms on Monday night for 2 hours. She uses ice on areas involved. Pt is progressing nicely with strength and mobility.  -BD    Precautions and Contraindications    Precautions/Limitations hip precautions- right  -BD    Subjective Pain    Able to rate subjective pain? yes  -BD    Pre-Treatment Pain Level 0  -BD    Post-Treatment Pain Level 0  -BD    Pain Assessment    Pain Assessment No/denies pain  -BD    Balance Skills Training    Standing-Level of Assistance Distant supervision  -BD    Static Standing Balance Support assistive device  -BD    Standing-Balance Activities Weight  Shift A-P;Weight Shift R-L  -BD    Standing Balance # of Minutes 15   4 standing exercises  -BD    Gait Balance-Level of Assistance Distant supervision  -BD    Gait Balance Support assistive device  -BD    Gait Balance Activities side-stepping;backwards  -BD    Gait Balance # of Minutes 6  -BD    Transfers    Transfers, Sit-Stand [place] independent  -BD    Transfers, Stand-Sit [place] independent  -BD    Transfers, Sit-Stand-Sit, Assist Device straight cane  -BD    Gait Assessment/Treatment    Gait, [place] Level stand by assist  -BD    Gait, Assistive Device straight cane  -BD    Gait, Distance (Feet) 150  -BD    Gait, Gait Pattern Analysis swing-through gait  -BD    Gait, Safety Issues step length decreased  -BD    Gait, Impairments strength decreased  -BD    Gait, Comment Pt used cane most of tx, She used R walker to step outside  -    Stairs Assessment/Treatment    Number of Stairs 1  -BD    Stairs, Handrail Location none  -BD    Stairs, [place] Level supervision required  -BD    Stairs, Assistive Device walker  -BD    Stairs, Technique Used step to step (ascending)  -BD    Stairs, Comment Pt able to take one step outside using walker and  supervision for safety.  -      10/17/17 1045    Subjective Comments    Subjective Comments Pt states she had muscle spasms over the weekend and increased pain during that time. She contacted the orthopedic office about concerns with pain medicine for increased pain and spasms. She states she has been much better this Monday and Tuesday.  -BD    Precautions and Contraindications    Precautions/Limitations hip precautions- right  -BD    Subjective Pain    Able to rate subjective pain? yes  -BD    Pre-Treatment Pain Level 0  -BD    Post-Treatment Pain Level 0  -BD    Pain Assessment    Pain Assessment No/denies pain  -BD    Balance Skills Training    Standing-Level of Assistance Distant supervision  -BD    Static Standing Balance Support assistive device   -BD    Standing-Balance Activities Weight Shift A-P;Weight Shift R-L  -BD    Standing Balance # of Minutes 15   not at one tome, but total  -BD    Gait Balance-Level of Assistance Distant supervision  -BD    Gait Balance Support assistive device  -BD    Gait Balance Activities side-stepping  -BD    Transfers    Transfers, Sit-Stand [place] independent  -BD    Transfers, Stand-Sit [place] independent  -BD    Transfers, Sit-Stand-Sit, Assist Device rolling walker  -BD    Gait Assessment/Treatment    Gait, [place] Level independent;stand by assist  -BD    Gait, Assistive Device straight cane  -BD    Gait, Distance (Feet) 250  -BD    Gait, Gait Pattern Analysis swing-to gait  -BD    Gait, Gait Deviations dionisio decreased  -BD    Gait, Safety Issues step length decreased  -BD    Gait, Impairments strength decreased   low confidence  -BD    Gait, Comment Pt tried cane, first time since surgery. Good for prep for down the road.  -BD      User Key  (r) = Recorded By, (t) = Taken By, (c) = Cosigned By    Initials Name Provider Type    BD Lizzette Lr, PT Physical Therapist                            PT Assessment/Plan       10/19/17 1340       PT Assessment    Functional Limitations Limitation in home management;Performance in leisure activities  -BD     Impairments Gait;Impaired flexibility;Impaired muscle power;Muscle strength;Range of motion;Impaired muscle endurance  -BD     Assessment Comments Pt continues to improve strength and transision skills. PT progressed exercises and instruction for pt on using cane. Pt will try riding in car this weekend.  -BD     Please refer to paper survey for additional self-reported information No  -BD     Rehab Potential Excellent  -BD     Patient/caregiver participated in establishment of treatment plan and goals Yes  -BD     Patient would benefit from skilled therapy intervention Yes  -BD     PT Plan    PT Frequency 2x/week  -BD     Predicted Duration of Therapy  Intervention (days/wks) one or two more weeks, then to [place]  -BD     Planned CPT's? PT GAIT TRAINING EA 15 MIN: 39430;PT THER PROC EA 15 MIN: 63588  -BD     Physical Therapy Interventions (Optional Details) gait training;bed mobility training;gross motor skills;home exercise program;strengthening;stretching;transfer training;ROM (Range of Motion);stair training  -BD     PT Plan Comments Continue HEP and home visits 2 x / week. Pt to progress toward more independent transfers and ambulation at home.  -BD       User Key  (r) = Recorded By, (t) = Taken By, (c) = Cosigned By    Initials Name Provider Type    VLADISLAV Lr, PT Physical Therapist                    Exercises       10/19/17 1350          Subjective Comments    Subjective Comments Pt c/o having spasms on Monday night for 2 hours. She uses ice on areas involved. Pt is progressing nicely with strength and mobility.  -BD      Subjective Pain    Able to rate subjective pain? yes  -BD      Pre-Treatment Pain Level 0  -BD      Post-Treatment Pain Level 0  -BD      Exercise 1    Additional Comments see paper list in pt chart  -BD        User Key  (r) = Recorded By, (t) = Taken By, (c) = Cosigned By    Initials Name Provider Type    VLADISLAV Lr, PT Physical Therapist                               PT OP Goals       10/19/17 1350       Time Calculation    PT Goal Re-Cert Due Date 10/05/18  -BD       User Key  (r) = Recorded By, (t) = Taken By, (c) = Cosigned By    Initials Name Provider Type    VLADISLAV Lr, MICHEAL Physical Therapist                Therapy Education       10/19/17 1550          Therapy Education    Education Details Pt instructed on using stair step and HEP progressed. Pt also instructed on getting into and out of various furniture in her home. ALso instructed on bed mobility  -BD      Given HEP;Posture/body mechanics;Mobility training  -BD      Program Progressed  -BD      How Provided Verbal;Demonstration  -BD       Provided to Patient  -BD      Level of Understanding Verbalized;Demonstrated  -BD        User Key  (r) = Recorded By, (t) = Taken By, (c) = Cosigned By    Initials Name Provider Type    VLADISLAV Lr, PT Physical Therapist                Time Calculation:   Start Time: 1350  Total Timed Code Minutes- PT: 50 minute(s)    Therapy Charges for Today     Code Description Service Date Service Provider Modifiers Qty    41609831338  GAIT TRAINING EA 15 MIN 10/19/2017 Lizzette Lr, PT GP 1    [id number]  PT THER PROC EA 15 MIN 10/19/2017 Lizzette Lr, PT GP 2                    Lizzette Lr, PT  10/19/2017

## 2017-10-20 ENCOUNTER — APPOINTMENT (OUTPATIENT)
Dept: PHYSICAL THERAPY | Facility: HOSPITAL | Age: 72
End: 2017-10-20

## 2017-10-23 ENCOUNTER — OFFICE VISIT (OUTPATIENT)
Dept: ORTHOPEDIC SURGERY | Facility: CLINIC | Age: 72
End: 2017-10-23

## 2017-10-23 VITALS
HEIGHT: 65 IN | DIASTOLIC BLOOD PRESSURE: 78 MMHG | SYSTOLIC BLOOD PRESSURE: 124 MMHG | BODY MASS INDEX: 24.49 KG/M2 | WEIGHT: 147 LBS | HEART RATE: 75 BPM

## 2017-10-23 DIAGNOSIS — Z96.641 STATUS POST TOTAL REPLACEMENT OF RIGHT HIP: Primary | ICD-10-CM

## 2017-10-23 DIAGNOSIS — Z47.89 ORTHOPEDIC AFTERCARE: ICD-10-CM

## 2017-10-23 PROCEDURE — 99024 POSTOP FOLLOW-UP VISIT: CPT | Performed by: ORTHOPAEDIC SURGERY

## 2017-10-23 NOTE — PROGRESS NOTES
[facility]    Subjective     Chief Complaint   Patient presents with   • Post-op     3 weeks s/p (R) Total Hip Arthroplasty 10/03/17        HPI    Jacques Yeboah is a 72 y.o. female. Patient is doing well today.  No complaints.  Pain is improving in the hip.  Only mild pain currently, and is 75-80% improved compared to her preoperative symptoms.  She is ambulating with a walker, but uses a cane at home.      Patient Active Problem List   Diagnosis   • Hyperlipidemia   • Arthritis of right hip   • Status post total replacement of right hip   • Acute blood loss anemia, mild, asymptomatic     Past Medical History:   Diagnosis Date   • Arthritis    • H/O bone density study    • Hip pain    • History of colonoscopy    • History of mammogram    • Impacted cerumen of right ear     Ears needs rinsed out, feels clogged two days.    • Inflammatory arthritis    • Lumbago    • Lumbar degenerative disc disease    • Metatarsalgia, left foot    • Osteoarthritis of knees, bilateral    • Pelvic fracture    • Primary osteoarthritis of both hips    • Sciatica    • Thoracic spondylosis    • Wears glasses       Past Surgical History:   Procedure Laterality Date   • COLONOSCOPY     • EYE SURGERY     • FRACTURE SURGERY      left wrist; pelvis   • TONSILLECTOMY     • TOTAL HIP ARTHROPLASTY Right 10/3/2017    Procedure: TOTAL HIP ARTHROPLASTY RIGHT;  Surgeon: Prem Hodges MD;  Location: Replaced by [facility];  Service:    • TUBAL ABDOMINAL LIGATION        Family History   Problem Relation Age of Onset   • Heart failure Mother      CHF   • Osteoarthritis Mother    • Heart attack Father      Acute MI   • Stroke Father      Stroke syndrome     Social History     Social History   • Marital status:      Spouse name: N/A   • Number of children: N/A   • Years of education: N/A     Occupational History   • Not on file.     Social History Main Topics   • Smoking status: Never Smoker   • Smokeless tobacco: Never Used    • Alcohol use No   • Drug use: No   • Sexual activity: Defer     Other Topics Concern   • Not on file     Social History Narrative      Current Outpatient Prescriptions on File Prior to Visit   Medication Sig Dispense Refill   • aspirin  MG EC tablet Take 1 tablet by mouth Daily. For 1 month 30 tablet 0   • calcium carbonate (OS-SKYLER) 600 MG tablet Take 600 mg by mouth 2 (Two) Times a Day With Meals.     • docusate sodium 100 MG capsule Take 100 mg by mouth 2 (Two) Times a Day As Needed for Constipation. 60 capsule 0   • HYDROcodone-acetaminophen (NORCO) 7.5-325 MG per tablet Take 1 tablet by mouth Every 6 (Six) Hours As Needed for Moderate Pain . 10 tablet 0   • simvastatin (ZOCOR) 20 MG tablet Take 20 mg by mouth Every Night.       No current facility-administered medications on file prior to visit.       No Known Allergies     Review of Systems   Constitutional: Negative for activity change, appetite change, chills, diaphoresis, fatigue, fever and unexpected weight change.   HENT: Negative for congestion, dental problem, drooling, ear discharge, ear pain, facial swelling, hearing loss, mouth sores, nosebleeds, postnasal drip, rhinorrhea, sinus pressure, sneezing, sore throat, tinnitus, trouble swallowing and voice change.    Eyes: Negative for photophobia, pain, discharge, redness, itching and visual disturbance.   Respiratory: Negative for apnea, cough, choking, chest tightness, shortness of breath, wheezing and stridor.    Cardiovascular: Negative for chest pain, palpitations and leg swelling.   Gastrointestinal: Negative for abdominal distention, abdominal pain, anal bleeding, blood in stool, constipation, diarrhea, nausea, rectal pain and vomiting.   Endocrine: Negative for cold intolerance, heat intolerance, polydipsia, polyphagia and polyuria.   Genitourinary: Negative for decreased urine volume, difficulty urinating, dysuria, enuresis, flank pain, frequency, genital sores, hematuria and urgency.  "  Musculoskeletal: Positive for back pain. Negative for arthralgias, gait problem, joint swelling, myalgias, neck pain and neck stiffness.   Skin: Negative for color change, pallor, rash and wound.   Allergic/Immunologic: Negative for environmental allergies, food allergies and immunocompromised state.   Neurological: Negative for dizziness, tremors, seizures, syncope, facial asymmetry, speech difficulty, weakness, light-headedness, numbness and headaches.   Hematological: Negative for adenopathy. Does not bruise/bleed easily.   Psychiatric/Behavioral: Negative for agitation, behavioral problems, confusion, decreased concentration, dysphoric mood, hallucinations, self-injury, sleep disturbance and suicidal ideas. The patient is not nervous/anxious and is not hyperactive.         Objective      Physical Exam  /78  Pulse 75  Ht 64.6\" (164.1 cm)  Wt 147 lb (66.7 kg)  BMI 24.77 kg/m2    Body mass index is 24.77 kg/(m^2).    General:   Mental Status:  Alert   Appearance: Cooperative, in no acute distress   Build and Nutrition: Well-nourished and well developed female   Orientation: Alert and oriented to person, place and time   Posture: Normal   Gait: Walker    Integument:   Right hip: Wound is well-healed with no signs of infection    Lower Extremity:   Right Hip:    Tenderness:  None    Swelling:  None    Crepitus:  None    Range of motion:  External Rotation: 30°       Internal Rotation: 30°       Flexion:  100°       Extension:  0°    Deformities:  None  Functional testing: Negative Stinchfield    No leg length discrepancy      Imaging/Studies  Imaging Results (last 24 hours)     Procedure Component Value Units Date/Time    XR Hip With or Without Pelvis 2 - 3 View Right [[id number]] Resulted:  10/23/17 1542     Updated:  10/23/17 1542    Narrative:       Right Hip Radiographs  Indication: status-post right total hip arthroplasty  Views: low AP pelvis and lateral of the right hip    Comparison: no change " compared to prior study    Findings:   The components are well aligned, with no signs of loosening or failure.            Assessment and Plan     Jacquse was seen today for post-op.    Diagnoses and all orders for this visit:    Status post total replacement of right hip  -     XR Hip With or Without Pelvis 2 - 3 View Right    Orthopedic aftercare        I reviewed my findings with patient today.  She's doing well following her right total hip arthroplasty.  I will see her back in 2 months, with no x-rays.  I will see her back sooner for any problems.  She will continue with her therapy.    Return in about 2 months (around 12/23/2017) for Recheck without X-Rays.        Prem Hodges MD  10/23/17  3:54 PM

## 2017-10-24 ENCOUNTER — HOSPITAL ENCOUNTER (OUTPATIENT)
Dept: PHYSICAL THERAPY | Facility: HOSPITAL | Age: 72
Setting detail: THERAPIES SERIES
Discharge: HOME | End: 2017-10-24

## 2017-10-24 PROCEDURE — 97116 GAIT TRAINING THERAPY: CPT

## 2017-10-24 PROCEDURE — 97110 THERAPEUTIC EXERCISES: CPT

## 2017-10-25 NOTE — THERAPY TREATMENT NOTE
Outpatient Physical Therapy Ortho Treatment Note   [place]     Patient Name: Jacques Yeboah  : 1945  MRN: [id number]  Today's Date: 10/24/2017      Visit Date: 10/24/2017    Visit Dx:  No diagnosis found.    Patient Active Problem List   Diagnosis   • Hyperlipidemia   • Arthritis of right hip   • Status post total replacement of right hip   • Acute blood loss anemia, mild, asymptomatic        Past Medical History:   Diagnosis Date   • Arthritis    • H/O bone density study    • Hip pain    • History of colonoscopy    • History of mammogram    • Impacted cerumen of right ear     Ears needs rinsed out, feels clogged two days.    • Inflammatory arthritis    • Lumbago    • Lumbar degenerative disc disease    • Metatarsalgia, left foot    • Osteoarthritis of knees, bilateral    • Pelvic fracture    • Primary osteoarthritis of both hips    • Sciatica    • Thoracic spondylosis    • Wears glasses         Past Surgical History:   Procedure Laterality Date   • COLONOSCOPY     • EYE SURGERY     • FRACTURE SURGERY      left wrist; pelvis   • TONSILLECTOMY     • TOTAL HIP ARTHROPLASTY Right 10/3/2017    Procedure: TOTAL HIP ARTHROPLASTY RIGHT;  Surgeon: Prem Hodges MD;  Location: [facility];  Service:    • TUBAL ABDOMINAL LIGATION               PT Ortho       10/24/17 1030    Subjective Comments    Subjective Comments Pt states she is feeling better, less spasms and moving easier.   -BD    Precautions and Contraindications    Precautions/Limitations hip precautions- right  -BD    Subjective Pain    Able to rate subjective pain? yes  -BD    Pre-Treatment Pain Level 0  -BD    Post-Treatment Pain Level 0  -BD    Pain Assessment    Pain Assessment No/denies pain  -BD    Balance Skills Training    Standing-Level of Assistance Distant supervision  -BD    Static Standing Balance Support assistive device  -BD    Standing-Balance Activities Weight Shift A-P;Weight Shift R-L  -BD    Standing Balance # of Minutes 15   -BD    Gait Balance-Level of Assistance Distant supervision  -BD    Gait Balance Support assistive device  -BD    Gait Balance Activities side-stepping;backwards  -BD    Gait Balance # of Minutes 5  -BD    Transfers    Transfers, Sit-Stand [place] independent  -BD    Transfers, Stand-Sit [place] independent  -BD    Transfers, Sit-Stand-Sit, Assist Device straight cane  -BD    Gait Assessment/Treatment    Gait, [place] Level conditional independence  -BD    Gait, Assistive Device straight cane  -BD    Gait, Distance (Feet) 150  -BD    Gait, Gait Pattern Analysis swing-through gait  -BD    Gait, Safety Issues step length decreased  -BD    Gait, Impairments strength decreased  -BD    Gait, Comment Pt walking more symmetrical  -BD      User Key  (r) = Recorded By, (t) = Taken By, (c) = Cosigned By    Initials Name Provider Type    BD Lizzette Lr, PT Physical Therapist                            PT Assessment/Plan       10/24/17 2053       PT Assessment    Functional Limitations Limitations in community activities;Limitation in home management;Impaired locomotion;Impaired gait  -BD     Impairments Gait;Muscle strength;Impaired muscle endurance;Range of motion;Impaired muscle power;Impaired flexibility;Locomotion  -BD     Assessment Comments Pt has begun using straight cane in her home for mobility support. Gait balance and alignment are improving. Hip abduction, flexion and extension with weaknesses.  -BD     Please refer to paper survey for additional self-reported information No  -BD     Rehab Potential Excellent  -BD     Patient/caregiver participated in establishment of treatment plan and goals Yes  -BD     Patient would benefit from skilled therapy intervention Yes  -BD     PT Plan    PT Frequency 2x/week  -BD     Predicted Duration of Therapy Intervention (days/wks) Begin OP at [facility] next week, Monday or Tuesday.  -BD     Planned CPT's? PT THER PROC EA 15 MIN: 53560;PT GAIT TRAINING  "EA 15 MIN: 36293  -BD     Physical Therapy Interventions (Optional Details) gait training;stretching;patient/family education;bed mobility training;home exercise program;transfer training;ROM (Range of Motion)  -BD     PT Plan Comments Continue HEP and home visits this week x 2. Progress HEP and independent functional movement.  Begin OP [facility] next week..  -BD       User Key  (r) = Recorded By, (t) = Taken By, (c) = Cosigned By    Initials Name Provider Type    VLADISLAV Lr, PT Physical Therapist                    Exercises       10/24/17 1030          Subjective Comments    Subjective Comments Pt states she is feeling better, less spasms and moving easier.   -BD      Subjective Pain    Able to rate subjective pain? yes  -BD      Pre-Treatment Pain Level 0  -BD      Post-Treatment Pain Level 0  -BD      Exercise 1    Additional Comments see paper list in tp chart  -BD        User Key  (r) = Recorded By, (t) = Taken By, (c) = Cosigned By    Initials Name Provider Type    VLADISLAV Lr, PT Physical Therapist                               PT OP Goals       10/24/17 1030       Time Calculation    PT Goal Re-Cert Due Date 01/05/18  -BD       User Key  (r) = Recorded By, (t) = Taken By, (c) = Cosigned By    Initials Name Provider Type    VLADISLAV Lr PT Physical Therapist                Therapy Education       10/24/17 1030          Therapy Education    Education Details HEP progressed with \"clam shell\" exercises, H S stretches and all exercises independent with supervision. Transfers to DR and kitchen chairs reviewed.  -BD      Given HEP;Posture/body mechanics;Mobility training  -BD      Program Progressed  -BD      How Provided Verbal;Demonstration  -BD      Provided to Patient  -BD      Level of Understanding Verbalized  -BD        User Key  (r) = Recorded By, (t) = Taken By, (c) = Cosigned By    Initials Name Provider Type    VLADISLAV rL, MICHEAL Physical " Therapist                Time Calculation:   Start Time: 1030  Total Timed Code Minutes- PT: 45 minute(s)    Therapy Charges for Today     Code Description Service Date Service Provider Modifiers Qty    [id number]  GAIT TRAINING EA 15 MIN 10/24/2017 Lizzette Lr, PT GP 1    [id number]  PT THER PROC EA 15 MIN 10/24/2017 Lizzette Lr, PT GP 2                    Lizzette Lr, PT  10/24/2017

## 2017-10-27 ENCOUNTER — HOSPITAL ENCOUNTER (OUTPATIENT)
Dept: PHYSICAL THERAPY | Facility: HOSPITAL | Age: 72
Setting detail: THERAPIES SERIES
Discharge: HOME | End: 2017-10-27

## 2017-10-27 PROCEDURE — 97110 THERAPEUTIC EXERCISES: CPT

## 2017-10-27 PROCEDURE — 97116 GAIT TRAINING THERAPY: CPT

## 2017-10-27 NOTE — THERAPY TREATMENT NOTE
Outpatient Physical Therapy Ortho Treatment Note   [place]     Patient Name: Jacques Yeboah  : 1945  MRN: [id number]  Today's Date: 10/27/2017      Visit Date: 10/27/2017    Visit Dx:  No diagnosis found.    Patient Active Problem List   Diagnosis   • Hyperlipidemia   • Arthritis of right hip   • Status post total replacement of right hip   • Acute blood loss anemia, mild, asymptomatic        Past Medical History:   Diagnosis Date   • Arthritis    • H/O bone density study    • Hip pain    • History of colonoscopy    • History of mammogram    • Impacted cerumen of right ear     Ears needs rinsed out, feels clogged two days.    • Inflammatory arthritis    • Lumbago    • Lumbar degenerative disc disease    • Metatarsalgia, left foot    • Osteoarthritis of knees, bilateral    • Pelvic fracture    • Primary osteoarthritis of both hips    • Sciatica    • Thoracic spondylosis    • Wears glasses         Past Surgical History:   Procedure Laterality Date   • COLONOSCOPY     • EYE SURGERY     • FRACTURE SURGERY      left wrist; pelvis   • TONSILLECTOMY     • TOTAL HIP ARTHROPLASTY Right 10/3/2017    Procedure: TOTAL HIP ARTHROPLASTY RIGHT;  Surgeon: Prem Hodges MD;  Location: [facility];  Service:    • TUBAL ABDOMINAL LIGATION               PT Ortho       10/27/17 1045    Subjective Comments    Subjective Comments PT states she is doing much better, walking some with out cane, in the house.  -BD    Precautions and Contraindications    Precautions/Limitations hip precautions- right  -BD    Subjective Pain    Able to rate subjective pain? yes  -BD    Pre-Treatment Pain Level 0  -BD    Post-Treatment Pain Level 0  -BD    Pain Assessment    Pain Assessment No/denies pain  -BD    Balance Skills Training    Sitting-Level of Assistance Independent  -BD    Sitting-Balance Support Feet supported  -BD    Standing-Level of Assistance Independent  -BD    Static Standing Balance Support assistive device   some  static standing independently without AD  -BD    Standing-Balance Activities Weight Shift A-P;Weight Shift R-L  -BD    Standing Balance # of Minutes 18  -BD    Gait Balance-Level of Assistance Independent  -BD    Gait Balance Support assistive device   minimnal gt without AD in the house.  -BD    Gait Balance Activities side-stepping;backwards  -BD    Gait Balance # of Minutes 5  -BD    Transfers    Transfers, Sit-Stand [place] independent  -BD    Transfers, Stand-Sit [place] independent  -BD    Transfers, Sit-Stand-Sit, Assist Device straight cane;other (see comments)   and some transfers independent of AD  -BD    Gait Assessment/Treatment    Gait, [place] Level conditional independence  -BD    Gait, Distance (Feet) 150  -BD    Gait, Gait Pattern Analysis swing-through gait  -BD    Gait, Safety Issues step length decreased  -BD    Gait, Impairments strength decreased  -BD    Gait, Comment Pt doing well with independent gt, short distances at home.  -BD      User Key  (r) = Recorded By, (t) = Taken By, (c) = Cosigned By    Initials Name Provider Type    BD Lizzette Lr, PT Physical Therapist                            PT Assessment/Plan       10/27/17 1242       PT Assessment    Functional Limitations Limitations in community activities;Impaired locomotion;Impaired gait  -BD     Impairments Gait;Impaired muscle endurance;Muscle strength;Range of motion;Impaired muscle power  -BD     Assessment Comments Pt is beginning to plan community activity parataicipation. Pt has improved movement technique, biomechanics and endurance. Pt to continue working on improving areas of weakness at OP at [facility] next week.  -BD     Rehab Potential Excellent  -BD     Patient/caregiver participated in establishment of treatment plan and goals Yes  -BD     Patient would benefit from skilled therapy intervention Yes  -BD     PT Plan    PT Frequency Other (comment)   Pt to transition to [facility] to  complete OP PT services.  -BD     Predicted Duration of Therapy Intervention (days/wks) Pt to transition to [facility] OP PT services on Monday.  -BD     Planned CPT's? PT THER PROC EA 15 MIN: 47680;PT GAIT TRAINING EA 15 MIN: 77906  -BD     Physical Therapy Interventions (Optional Details) ROM (Range of Motion);bed mobility training;strengthening;stretching;transfer training;gait training;home exercise program;patient/family education  -BD     PT Plan Comments PT is confident with HEP and will transition to OP services at [facility] [place] Monday.  -BD       User Key  (r) = Recorded By, (t) = Taken By, (c) = Cosigned By    Initials Name Provider Type    VLADISLAV Lr, PT Physical Therapist                    Exercises       10/27/17 1045          Subjective Comments    Subjective Comments PT states she is doing much better, walking some with out cane, in the house.  -BD      Subjective Pain    Able to rate subjective pain? yes  -BD      Pre-Treatment Pain Level 0  -BD      Post-Treatment Pain Level 0  -BD      Exercise 1    Additional Comments See paper list in pt's chart.  -BD        User Key  (r) = Recorded By, (t) = Taken By, (c) = Cosigned By    Initials Name Provider Type    VLADISLAV Lr, PT Physical Therapist                               PT OP Goals       10/27/17 1248       Time Calculation    PT Goal Re-Cert Due Date 01/05/18  -BD       User Key  (r) = Recorded By, (t) = Taken By, (c) = Cosigned By    Initials Name Provider Type    VLADISLAV Lr, PT Physical Therapist                Therapy Education       10/27/17 1240          Therapy Education    Education Details HEP reviewed with several propgressions, body mechanics emphasized for best venefit. Transitions from low furniture reviewed and demonstrated.  -BD      Given HEP;Mobility training;Posture/body mechanics  -BD      Program Reinforced  -BD      How Provided Verbal;Demonstration  -BD      Provided to Patient  -BD       Level of Understanding Verbalized;Demonstrated  -VLADISLAV        User Key  (r) = Recorded By, (t) = Taken By, (c) = Cosigned By    Initials Name Provider Type    VLADISLAV Lr, PT Physical Therapist                Time Calculation:   Start Time: 1045  Total Timed Code Minutes- PT: 40 minute(s)    Therapy Charges for Today     Code Description Service Date Service Provider Modifiers Qty    43457325399  GAIT TRAINING EA 15 MIN 10/27/2017 Lizzette Lr, PT GP 1    [id number]  PT THER PROC EA 15 MIN 10/27/2017 Lizzette Lr, PT GP 2                    Lizzette Lr, PT  10/27/2017

## 2017-10-30 ENCOUNTER — HOSPITAL ENCOUNTER (OUTPATIENT)
Dept: PHYSICAL THERAPY | Facility: HOSPITAL | Age: 72
Setting detail: THERAPIES SERIES
Discharge: HOME | End: 2017-10-30

## 2017-10-30 DIAGNOSIS — Z74.09 IMPAIRED FUNCTIONAL MOBILITY AND ACTIVITY TOLERANCE: Primary | ICD-10-CM

## 2017-10-30 DIAGNOSIS — Z96.641 STATUS POST TOTAL REPLACEMENT OF RIGHT HIP: ICD-10-CM

## 2017-10-30 PROCEDURE — 97110 THERAPEUTIC EXERCISES: CPT | Performed by: PHYSICAL THERAPIST

## 2017-11-03 ENCOUNTER — HOSPITAL ENCOUNTER (OUTPATIENT)
Dept: PHYSICAL THERAPY | Facility: HOSPITAL | Age: 72
Setting detail: THERAPIES SERIES
Discharge: HOME | End: 2017-11-03

## 2017-11-03 DIAGNOSIS — Z74.09 IMPAIRED FUNCTIONAL MOBILITY AND ACTIVITY TOLERANCE: Primary | ICD-10-CM

## 2017-11-03 DIAGNOSIS — Z96.641 STATUS POST TOTAL REPLACEMENT OF RIGHT HIP: ICD-10-CM

## 2017-11-03 PROCEDURE — G8978 MOBILITY CURRENT STATUS: HCPCS | Performed by: PHYSICAL THERAPIST

## 2017-11-03 PROCEDURE — G8979 MOBILITY GOAL STATUS: HCPCS | Performed by: PHYSICAL THERAPIST

## 2017-11-03 PROCEDURE — 97110 THERAPEUTIC EXERCISES: CPT | Performed by: PHYSICAL THERAPIST

## 2017-11-03 NOTE — THERAPY PROGRESS REPORT/RE-CERT
Outpatient Physical Therapy Ortho Re-Assessment  [facility]     Patient Name: Jacques Yeboah  : 1945  MRN: [id number]  Today's Date: 11/3/2017      Visit Date: 2017    Patient Active Problem List   Diagnosis   • Hyperlipidemia   • Arthritis of right hip   • Status post total replacement of right hip   • Acute blood loss anemia, mild, asymptomatic        Past Medical History:   Diagnosis Date   • Arthritis    • H/O bone density study    • Hip pain    • History of colonoscopy    • History of mammogram    • Impacted cerumen of right ear     Ears needs rinsed out, feels clogged two days.    • Inflammatory arthritis    • Lumbago    • Lumbar degenerative disc disease    • Metatarsalgia, left foot    • Osteoarthritis of knees, bilateral    • Pelvic fracture    • Primary osteoarthritis of both hips    • Sciatica    • Thoracic spondylosis    • Wears glasses         Past Surgical History:   Procedure Laterality Date   • COLONOSCOPY     • EYE SURGERY     • FRACTURE SURGERY      left wrist; pelvis   • TONSILLECTOMY     • TOTAL HIP ARTHROPLASTY Right 10/3/2017    Procedure: TOTAL HIP ARTHROPLASTY RIGHT;  Surgeon: Prem Hodges MD;  Location: [facility];  Service:    • TUBAL ABDOMINAL LIGATION         Visit Dx:     ICD-10-CM ICD-9-CM   1. Impaired functional mobility and activity tolerance Z74.09 V49.89   2. Status post total replacement of right hip Z96.641 V43.64                 PT Ortho       17 1100    Subjective Comments    Subjective Comments Pt reports that her hip is slightly sore after sitting for a long time yesterday. Otherwise feels that overall she is doing well. She is still somewhat limited w/ bed mobility and car t/f due to concern of breaking hip precautions.  -RB    Subjective Pain    Able to rate subjective pain? yes  -RB    Pre-Treatment Pain Level 0  -RB    Post-Treatment Pain Level 0  -RB    Posture/Observations    Posture/Observations Comments Pt presents ambulating w/ SPC on  L w/ mild antalgic gait.  -RB    General LE Assessment    ROM Detail R hip flexion AROM to 90 deg, abd 30 deg, ER WFL  -RB    MMT (Manual Muscle Testing)    General MMT Assessment lower extremity strength deficits identified  -RB    Right Hip    Hip Flexion Gross Movement (4/5) good  -RB    Hip Extension Gross Movement (4/5) good  -RB    Hip ABduction Gross Movement (4-/5) good minus  -RB    Hip Ext (Lat) Rotation Gross Movement (4-/5) good minus  -RB    Lower Extremity    Lower Ext Manual Muscle Testing right hip strength deficit;right knee strength deficit  -RB    Right Knee    Knee Extension Gross Movement (4+/5) good plus  -RB    Knee Flexion Gross Movement (5/5) normal  -RB    Stairs Assessment/Treatment    Stairs, Comment Pt ascends/descends stairs 1 at a time, tends to depend on LLE for WBing and HR/SPC for balance.   -RB      User Key  (r) = Recorded By, (t) = Taken By, (c) = Cosigned By    Initials Name Provider Type    ILIR Fowler, PT Physical Therapist                            Therapy Education       11/03/17 1604          Therapy Education    Education Details updated HEP to include hamstring stretching, clamshells, SL hip abd, mini squats, forward/lateral step ups  -RB      Given HEP  -RB      Program Progressed  -RB      How Provided Verbal;Demonstration;Written  -RB      Provided to Patient  -RB      Level of Understanding Teach back education performed  -RB        User Key  (r) = Recorded By, (t) = Taken By, (c) = Cosigned By    Initials Name Provider Type    ILIR Fowler, PT Physical Therapist                PT OP Goals       11/03/17 1610 11/03/17 1100    PT Short Term Goals    STG Date to Achieve  11/24/17  -RB    STG 1  Pt to be compliant w/ progressed HEP for strengthening, flexibility  -RB    STG 1 Progress  New  -RB    STG 2  Pt to ambulate household and community distances w/o AD w/ minimal to no deviation.  -RB    STG 2 Progress  New  -RB    STG 3  Pt to improve R quad strength  to 5/5.  -RB    STG 3 Progress  New  -RB    STG 4  --  -RB    Long Term Goals    LTG Date to Achieve  12/15/17  -RB    LTG 1  Pt to be independent w/ long term HEP for strengthening  -RB    LTG 1 Progress  New  -RB    LTG 2  Pt to ascend/descend flight of stairs reciprocally w/ minimal deviation, with or without use of HR  -RB    LTG 2 Progress  New  -RB    LTG 3  Pt to improve R hip strength by at least 1/2 grade in all planes.  -RB    LTG 3 Progress  New  -RB    LTG 4  Pt to improve LEFS score to at least 55/80 to reflect improved strength and functional mobility.  -RB    LTG 4 Progress  New  -RB    Time Calculation    PT Goal Re-Cert Due Date 01/05/18  -RB 01/05/18  -RB      User Key  (r) = Recorded By, (t) = Taken By, (c) = Cosigned By    Initials Name Provider Type    RB Courtney Fowler, PT Physical Therapist                PT Assessment/Plan       11/03/17 3788       PT Assessment    Functional Limitations Limitations in community activities;Impaired locomotion;Impaired gait  -RB     Impairments Gait;Impaired muscle endurance;Muscle strength;Range of motion;Impaired muscle power  -RB     Assessment Comments Pt progressing well w/ PT. She is making good improvements w/ hip/knee strength and functional mobility.  She continues to demonstrate deficits in R hip and quad strength, balance, and flexibility limiting gait, stair navigation, t/fs and daily activities. She would benefit from continued skilled PT services to address remaining deficits and maximize function.  -RB     Please refer to paper survey for additional self-reported information Yes  -RB     Rehab Potential Good  -RB     Patient/caregiver participated in establishment of treatment plan and goals Yes  -RB     Patient would benefit from skilled therapy intervention Yes  -RB     PT Plan    PT Frequency 2x/week  -RB     Predicted Duration of Therapy Intervention (days/wks) 6-8 visits  -RB     Planned CPT's? PT RE-EVAL: 03212;PT THER PROC EA 15 MIN:  02963;PT MANUAL THERAPY EA 15 MIN: 53968;PT NEUROMUSC RE-EDUCATION EA 15 MIN: 73635;PT GAIT TRAINING EA 15 MIN: 61959;PT HOT OR COLD PACK TREAT MCARE  -RB     PT Plan Comments Cont PT POC w/ emphasis on improving hip/knee strength, flexibility and optimize mechanics w/ gait and stair navigation.  -RB       User Key  (r) = Recorded By, (t) = Taken By, (c) = Cosigned By    Initials Name Provider Type    ILIR Fowler, PT Physical Therapist                  Exercises       11/03/17 1100          Subjective Comments    Subjective Comments Pt reports that her hip is slightly sore after sitting for a long time yesterday. Otherwise feels that overall she is doing well. She is still somewhat limited w/ bed mobility and car t/f due to concern of breaking hip precautions.  -RB      Subjective Pain    Able to rate subjective pain? yes  -RB      Pre-Treatment Pain Level 0  -RB      Post-Treatment Pain Level 0  -RB      Exercise 1    Exercise Name 1 Performed reassessment, reviewed HEP additions, and performed ther ex in clinic as per flow sheet.  -RB      Time (Minutes) 1 30  -RB        User Key  (r) = Recorded By, (t) = Taken By, (c) = Cosigned By    Initials Name Provider Type    ILIR Fowler, PT Physical Therapist                              Outcome Measures       11/03/17 1100          Lower Extremity Functional Index    Any of your usual work, housework or school activities 3  -RB      Your usual hobbies, recreational or sporting activities 4  -RB      Getting into or out of the bath 4  -RB      Walking between rooms 4  -RB      Putting on your shoes or socks 2  -RB      Squatting 1  -RB      Lifting an object, like a bag of groceries from the floor 3  -RB      Performing light activities around your home 4  -RB      Performing heavy activities around your home 2  -RB      Getting into or out of a car 4  -RB      Walking 2 blocks 2  -RB      Walking a mile 1  -RB      Going up or down 10 stairs (about 1 flight of  stairs) 0  -RB      Standing for 1 hour 3  -RB      Sitting for 1 hour 4  -RB      Running on even ground 0  -RB      Running on uneven ground 0  -RB      Making sharp turns while running fast 0  -RB      Hopping 0  -RB      Rolling over in bed 2  -RB      Total 43  -RB      Functional Assessment    Outcome Measure Options Lower Extremity Functional Scale (LEFS)  -RB        User Key  (r) = Recorded By, (t) = Taken By, (c) = Cosigned By    Initials Name Provider Type    RB Courtney Fowler, PT Physical Therapist            Time Calculation:   Start Time: 1130  Total Timed Code Minutes- PT: 30 minute(s)     Therapy Charges for Today     Code Description Service Date Service Provider Modifiers Qty    46158495678 HC PT MOBILITY CURRENT 11/3/2017 Courtney Fowler, PT GP, CK 1    36723800365 HC PT MOBILITY PROJECTED 11/3/2017 Courtney Fowler, PT GP, CI 1    01700143432 HC PT THER PROC EA 15 MIN 11/3/2017 Courtney Fowler, PT GP 2          PT G-Codes  PT Professional Judgement Used?: Yes  Outcome Measure Options: Lower Extremity Functional Scale (LEFS)  Score: 43/80  Functional Limitation: Mobility: Walking and moving around  Mobility: Walking and Moving Around Current Status (): At least 40 percent but less than 60 percent impaired, limited or restricted  Mobility: Walking and Moving Around Goal Status (): At least 1 percent but less than 20 percent impaired, limited or restricted         Courtney Fowler, PT  11/3/2017

## 2017-11-06 ENCOUNTER — HOSPITAL ENCOUNTER (OUTPATIENT)
Dept: PHYSICAL THERAPY | Facility: HOSPITAL | Age: 72
Setting detail: THERAPIES SERIES
Discharge: HOME | End: 2017-11-06

## 2017-11-06 DIAGNOSIS — Z96.641 STATUS POST TOTAL REPLACEMENT OF RIGHT HIP: ICD-10-CM

## 2017-11-06 DIAGNOSIS — Z74.09 IMPAIRED FUNCTIONAL MOBILITY AND ACTIVITY TOLERANCE: Primary | ICD-10-CM

## 2017-11-06 PROCEDURE — 97110 THERAPEUTIC EXERCISES: CPT | Performed by: PHYSICAL THERAPIST

## 2017-11-06 NOTE — THERAPY TREATMENT NOTE
Outpatient Physical Therapy Ortho Treatment Note  [facility]     Patient Name: Jacques Yeboah  : 1945  MRN: [id number]  Today's Date: 2017      Visit Date: 2017    Visit Dx:    ICD-10-CM ICD-9-CM   1. Impaired functional mobility and activity tolerance Z74.09 V49.89   2. Status post total replacement of right hip Z96.641 V43.64       Patient Active Problem List   Diagnosis   • Hyperlipidemia   • Arthritis of right hip   • Status post total replacement of right hip   • Acute blood loss anemia, mild, asymptomatic        Past Medical History:   Diagnosis Date   • Arthritis    • H/O bone density study    • Hip pain    • History of colonoscopy    • History of mammogram    • Impacted cerumen of right ear     Ears needs rinsed out, feels clogged two days.    • Inflammatory arthritis    • Lumbago    • Lumbar degenerative disc disease    • Metatarsalgia, left foot    • Osteoarthritis of knees, bilateral    • Pelvic fracture    • Primary osteoarthritis of both hips    • Sciatica    • Thoracic spondylosis    • Wears glasses         Past Surgical History:   Procedure Laterality Date   • COLONOSCOPY     • EYE SURGERY     • FRACTURE SURGERY      left wrist; pelvis   • TONSILLECTOMY     • TOTAL HIP ARTHROPLASTY Right 10/3/2017    Procedure: TOTAL HIP ARTHROPLASTY RIGHT;  Surgeon: Prem Hodges MD;  Location: [facility];  Service:    • TUBAL ABDOMINAL LIGATION               PT Ortho       17 1100    Subjective Comments    Subjective Comments Pt states she was very sore over the weekend after sitting throughout a [employer] function and then performing her exercises. However she has no pn or soreness today.  -RB    Subjective Pain    Able to rate subjective pain? yes  -RB    Pre-Treatment Pain Level 0  -RB    Post-Treatment Pain Level 0  -RB      User Key  (r) = Recorded By, (t) = Taken By, (c) = Cosigned By    Initials Name Provider Type    RB Courtney Fowler, PT Physical Therapist                             PT Assessment/Plan       11/06/17 1134       PT Assessment    Assessment Comments Pt tolerated ther ex progressions well. Discussed decreasing HEP frequency to 1x/day to prevent soreness from more progressed exercises and allow adequate muscle recovery.  -RB     PT Plan    PT Plan Comments Cont POC- add lateral step and reach  -RB       User Key  (r) = Recorded By, (t) = Taken By, (c) = Cosigned By    Initials Name Provider Type    ILIR Fowler, PT Physical Therapist                    Exercises       11/06/17 1100          Subjective Comments    Subjective Comments Pt states she was very sore over the weekend after sitting throughout a [employer] function and then performing her exercises. However she has no pn or soreness today.  -RB      Subjective Pain    Able to rate subjective pain? yes  -RB      Pre-Treatment Pain Level 0  -RB      Post-Treatment Pain Level 0  -RB      Exercise 1    Exercise Name 1 Ther ex in clinic as per flow sheet. Progressed to include squats on total gym, balance activities on foam pad, increased repetitions w/ step ups, and added RTB to clamshells in supine.  -RB      Time (Minutes) 1 45  -RB        User Key  (r) = Recorded By, (t) = Taken By, (c) = Cosigned By    Initials Name Provider Type    ILIR Fowler, PT Physical Therapist                               PT OP Goals       11/06/17 1136       Time Calculation    PT Goal Re-Cert Due Date 01/05/18  -RB       User Key  (r) = Recorded By, (t) = Taken By, (c) = Cosigned By    Initials Name Provider Type    ILIR Fowler, PT Physical Therapist                Therapy Education       11/06/17 1134          Therapy Education    Education Details provided RTB for clamshells  -RB      Given HEP  -RB      Program Progressed  -RB      How Provided Verbal  -RB      Provided to Patient  -RB      Level of Understanding Teach back education performed  -RB        User Key  (r) = Recorded By, (t) = Taken By, (c) =  Cosigned By    Initials Name Provider Type    RB Courtney Fowler, PT Physical Therapist                Time Calculation:   Start Time: 1045  Total Timed Code Minutes- PT: 45 minute(s)    Therapy Charges for Today     Code Description Service Date Service Provider Modifiers Qty    96927903209 HC PT THER PROC EA 15 MIN 11/6/2017 Courtney Fowler, PT GP 3                    Courtney Fowler, PT  11/6/2017

## 2017-11-09 ENCOUNTER — HOSPITAL ENCOUNTER (OUTPATIENT)
Dept: PHYSICAL THERAPY | Facility: HOSPITAL | Age: 72
Setting detail: THERAPIES SERIES
Discharge: HOME | End: 2017-11-09

## 2017-11-09 DIAGNOSIS — Z74.09 IMPAIRED FUNCTIONAL MOBILITY AND ACTIVITY TOLERANCE: Primary | ICD-10-CM

## 2017-11-09 DIAGNOSIS — Z96.641 STATUS POST TOTAL REPLACEMENT OF RIGHT HIP: ICD-10-CM

## 2017-11-09 PROCEDURE — 97110 THERAPEUTIC EXERCISES: CPT | Performed by: PHYSICAL THERAPIST

## 2017-11-09 NOTE — THERAPY TREATMENT NOTE
Outpatient Physical Therapy Ortho Treatment Note  [facility]     Patient Name: Jacques Yeboah  : 1945  MRN: [id number]  Today's Date: 2017      Visit Date: 2017    Visit Dx:    ICD-10-CM ICD-9-CM   1. Impaired functional mobility and activity tolerance Z74.09 V49.89   2. Status post total replacement of right hip Z96.641 V43.64       Patient Active Problem List   Diagnosis   • Hyperlipidemia   • Arthritis of right hip   • Status post total replacement of right hip   • Acute blood loss anemia, mild, asymptomatic        Past Medical History:   Diagnosis Date   • Arthritis    • H/O bone density study    • Hip pain    • History of colonoscopy    • History of mammogram    • Impacted cerumen of right ear     Ears needs rinsed out, feels clogged two days.    • Inflammatory arthritis    • Lumbago    • Lumbar degenerative disc disease    • Metatarsalgia, left foot    • Osteoarthritis of knees, bilateral    • Pelvic fracture    • Primary osteoarthritis of both hips    • Sciatica    • Thoracic spondylosis    • Wears glasses         Past Surgical History:   Procedure Laterality Date   • COLONOSCOPY     • EYE SURGERY     • FRACTURE SURGERY      left wrist; pelvis   • TONSILLECTOMY     • TOTAL HIP ARTHROPLASTY Right 10/3/2017    Procedure: TOTAL HIP ARTHROPLASTY RIGHT;  Surgeon: Prem Hodges MD;  Location: [facility];  Service:    • TUBAL ABDOMINAL LIGATION               PT Ortho       17 1100    Subjective Comments    Subjective Comments Pt denies soreness after last visit. HEP going well, doing 30 reps of everything except for step ups. Walking around the house without the cane, occasionally feels the need to hold onto furniture/walls for balance.  -RB    Subjective Pain    Able to rate subjective pain? yes  -RB    Pre-Treatment Pain Level 0  -RB    Post-Treatment Pain Level 0  -RB      User Key  (r) = Recorded By, (t) = Taken By, (c) = Cosigned By    Initials Name Provider Type    ILIR Valdez  BONI Fowler, PT Physical Therapist                            PT Assessment/Plan       11/09/17 1128       PT Assessment    Assessment Comments Pt initially vaulting when performing step ups, required use of bar for balance, but w/ repetition able to perform w/o UE support and w/o compensation.   -RB     PT Plan    PT Plan Comments Cont POC, progressing strengthening, balance activities as tolerated.  -RB       User Key  (r) = Recorded By, (t) = Taken By, (c) = Cosigned By    Initials Name Provider Type    ILIR Fowler, PT Physical Therapist                    Exercises       11/09/17 1100          Subjective Comments    Subjective Comments Pt denies soreness after last visit. HEP going well, doing 30 reps of everything except for step ups. Walking around the house without the cane, occasionally feels the need to hold onto furniture/walls for balance.  -RB      Subjective Pain    Able to rate subjective pain? yes  -RB      Pre-Treatment Pain Level 0  -RB      Post-Treatment Pain Level 0  -RB      Exercise 1    Exercise Name 1 Ther ex in clinic as per flow sheet. Practiced stepping over obstacles of different widths/heights w/o use of AD, added straddle steps, standing abd w/ TB, step up and over.  -RB      Time (Minutes) 1 40  -RB        User Key  (r) = Recorded By, (t) = Taken By, (c) = Cosigned By    Initials Name Provider Type    ILIR Fowler, PT Physical Therapist                               PT OP Goals       11/09/17 1131       Time Calculation    PT Goal Re-Cert Due Date 01/05/18  -RB       User Key  (r) = Recorded By, (t) = Taken By, (c) = Cosigned By    Initials Name Provider Type    ILIR Fowler, PT Physical Therapist                Therapy Education       11/09/17 1128          Therapy Education    Education Details issued [facility] [employer]  -RB      Program Progressed  -RB      How Provided Verbal  -RB      Provided to Patient  -RB      Level of Understanding Verbalized  -RB        User Key   (r) = Recorded By, (t) = Taken By, (c) = Cosigned By    Initials Name Provider Type    RB Courtney Fowler, PT Physical Therapist                Time Calculation:   Start Time: 1045  Total Timed Code Minutes- PT: 40 minute(s)    Therapy Charges for Today     Code Description Service Date Service Provider Modifiers Qty    [id number]  PT THER PROC EA 15 MIN 11/9/2017 Courtney Fowler, PT GP 3                    Courtney Fowler, PT  11/9/2017

## 2017-11-14 ENCOUNTER — HOSPITAL ENCOUNTER (OUTPATIENT)
Dept: PHYSICAL THERAPY | Facility: HOSPITAL | Age: 72
Setting detail: THERAPIES SERIES
Discharge: HOME | End: 2017-11-14

## 2017-11-14 ENCOUNTER — OFFICE VISIT (OUTPATIENT)
Dept: FAMILY MEDICINE CLINIC | Facility: CLINIC | Age: 72
End: 2017-11-14

## 2017-11-14 VITALS
WEIGHT: 150 LBS | SYSTOLIC BLOOD PRESSURE: 116 MMHG | BODY MASS INDEX: 25.27 KG/M2 | HEART RATE: 84 BPM | DIASTOLIC BLOOD PRESSURE: 70 MMHG | TEMPERATURE: 98.4 F

## 2017-11-14 DIAGNOSIS — Z74.09 IMPAIRED FUNCTIONAL MOBILITY AND ACTIVITY TOLERANCE: Primary | ICD-10-CM

## 2017-11-14 DIAGNOSIS — Z96.641 STATUS POST TOTAL REPLACEMENT OF RIGHT HIP: ICD-10-CM

## 2017-11-14 DIAGNOSIS — H61.23 BILATERAL IMPACTED CERUMEN: Primary | ICD-10-CM

## 2017-11-14 PROCEDURE — 99213 OFFICE O/P EST LOW 20 MIN: CPT | Performed by: FAMILY MEDICINE

## 2017-11-14 PROCEDURE — 97110 THERAPEUTIC EXERCISES: CPT | Performed by: PHYSICAL THERAPIST

## 2017-11-14 NOTE — THERAPY TREATMENT NOTE
Outpatient Physical Therapy Ortho Treatment Note   [facility]     Patient Name: Jacques Yeboah  : 1945  MRN: [id number]  Today's Date: 2017      Visit Date: 2017    Visit Dx:    ICD-10-CM ICD-9-CM   1. Impaired functional mobility and activity tolerance Z74.09 V49.89   2. Status post total replacement of right hip Z96.641 V43.64       Patient Active Problem List   Diagnosis   • Hyperlipidemia   • Arthritis of right hip   • Status post total replacement of right hip   • Acute blood loss anemia, mild, asymptomatic        Past Medical History:   Diagnosis Date   • Arthritis    • H/O bone density study    • Hip pain    • History of colonoscopy    • History of mammogram    • Impacted cerumen of right ear     Ears needs rinsed out, feels clogged two days.    • Inflammatory arthritis    • Lumbago    • Lumbar degenerative disc disease    • Metatarsalgia, left foot    • Osteoarthritis of knees, bilateral    • Pelvic fracture    • Primary osteoarthritis of both hips    • Sciatica    • Thoracic spondylosis    • Wears glasses         Past Surgical History:   Procedure Laterality Date   • COLONOSCOPY     • EYE SURGERY     • FRACTURE SURGERY      left wrist; pelvis   • TONSILLECTOMY     • TOTAL HIP ARTHROPLASTY Right 10/3/2017    Procedure: TOTAL HIP ARTHROPLASTY RIGHT;  Surgeon: Prem Hodges MD;  Location: [facility];  Service:    • TUBAL ABDOMINAL LIGATION               PT Ortho       17 1200    Subjective Comments    Subjective Comments Was sore on , but unsure what may have caused it. Still having discomfort sitting long periods, riding in the car for extended periods. Stairs becoming less difficult for her.  -RB    Subjective Pain    Able to rate subjective pain? yes  -RB    Pre-Treatment Pain Level 0  -RB    Post-Treatment Pain Level 0  -RB    Stairs Assessment/Treatment    Stairs, Comment Pt ascends/descends 1 flight reciprocally, occasional use of HR, no significant deviation  noted.  -RB      User Key  (r) = Recorded By, (t) = Taken By, (c) = Cosigned By    Initials Name Provider Type    ILIR Fowler, PT Physical Therapist                            PT Assessment/Plan       11/14/17 1210       PT Assessment    Assessment Comments Pt progressing well w/ strength, balance, and mobility. Today she demonstrated reciprocal stair navigation w/o deviation, and only occasional use of HR for balance.  -RB     PT Plan    PT Plan Comments Reduce frequency to 1x/wk for next 2-3 wks, then plan d/c to HEP.  -RB       User Key  (r) = Recorded By, (t) = Taken By, (c) = Cosigned By    Initials Name Provider Type    ILIR Fowler, PT Physical Therapist                    Exercises       11/14/17 1200          Subjective Comments    Subjective Comments Was sore on Sunday, but unsure what may have caused it. Still having discomfort sitting long periods, riding in the car for extended periods. Stairs becoming less difficult for her.  -RB      Subjective Pain    Able to rate subjective pain? yes  -RB      Pre-Treatment Pain Level 0  -RB      Post-Treatment Pain Level 0  -RB      Exercise 1    Exercise Name 1 Ther ex in clinic as per flow sheet. Progressed to include reciprocal stair training, hip abd/extn w/ RTB, lateral lunges.  -RB      Time (Minutes) 1 45  -RB        User Key  (r) = Recorded By, (t) = Taken By, (c) = Cosigned By    Initials Name Provider Type    ILIR Fowler, PT Physical Therapist                               PT OP Goals       11/14/17 1211       Time Calculation    PT Goal Re-Cert Due Date 01/05/18  -RB       User Key  (r) = Recorded By, (t) = Taken By, (c) = Cosigned By    Initials Name Provider Type    ILIR Fowler, PT Physical Therapist                Therapy Education       11/14/17 1210          Therapy Education    Education Details updated HEP to include st hip abd/ext w/ TB, lateral lunges, and discussed continuing step ups by navigating stairs at home.  -RB       Given HEP  -RB      Program Progressed  -RB      How Provided Verbal;Demonstration;Written  -RB      Provided to Patient  -RB      Level of Understanding Teach back education performed  -RB        User Key  (r) = Recorded By, (t) = Taken By, (c) = Cosigned By    Initials Name Provider Type    RB Courtney Fowler, PT Physical Therapist                Time Calculation:   Start Time: 1015  Total Timed Code Minutes- PT: 45 minute(s)    Therapy Charges for Today     Code Description Service Date Service Provider Modifiers Qty    [id number]  PT THER PROC EA 15 MIN 11/14/2017 Courtney Fowler, PT GP 3                    Courtney Fowler, PT  11/14/2017

## 2017-11-14 NOTE — PROGRESS NOTES
Subjective   Jacques Yeboah is a 72 y.o. female.     History of Present Illness   Six weeks post hip replacement. Mild swelling, continues physical therapy.  Using cane for balance.  Ears obstructed.   The following portions of the patient's history were reviewed and updated as appropriate: allergies, current medications, past family history, past medical history, past social history, past surgical history and problem list.    Review of Systems   Constitutional: Negative.    HENT: Positive for hearing loss.    Respiratory: Negative.    Cardiovascular: Negative.        Objective   Physical Exam   Constitutional: She appears well-developed.   HENT:   Right Ear: Tympanic membrane normal.   Left Ear: Tympanic membrane normal.   Wax plug removed from both canals with combination of lavage and curette.   Pulmonary/Chest: Effort normal.   Neurological: She is alert.   Vitals reviewed.      Assessment/Plan   Jacques was seen today for cerumen impaction.    Diagnoses and all orders for this visit:    Bilateral impacted cerumen

## 2017-11-17 ENCOUNTER — HOSPITAL ENCOUNTER (OUTPATIENT)
Dept: PHYSICAL THERAPY | Facility: HOSPITAL | Age: 72
Setting detail: THERAPIES SERIES
Discharge: HOME | End: 2017-11-17

## 2017-11-17 DIAGNOSIS — Z96.641 STATUS POST TOTAL REPLACEMENT OF RIGHT HIP: ICD-10-CM

## 2017-11-17 DIAGNOSIS — Z74.09 IMPAIRED FUNCTIONAL MOBILITY AND ACTIVITY TOLERANCE: Primary | ICD-10-CM

## 2017-11-17 PROCEDURE — 97110 THERAPEUTIC EXERCISES: CPT | Performed by: PHYSICAL THERAPIST

## 2017-11-17 NOTE — THERAPY TREATMENT NOTE
Outpatient Physical Therapy Ortho Treatment Note  [facility]     Patient Name: Jacques Yeboah  : 1945  MRN: [id number]  Today's Date: 2017      Visit Date: 2017    Visit Dx:    ICD-10-CM ICD-9-CM   1. Impaired functional mobility and activity tolerance Z74.09 V49.89   2. Status post total replacement of right hip Z96.641 V43.64       Patient Active Problem List   Diagnosis   • Hyperlipidemia   • Arthritis of right hip   • Status post total replacement of right hip   • Acute blood loss anemia, mild, asymptomatic        Past Medical History:   Diagnosis Date   • Arthritis    • H/O bone density study    • Hip pain    • History of colonoscopy    • History of mammogram    • Impacted cerumen of right ear     Ears needs rinsed out, feels clogged two days.    • Inflammatory arthritis    • Lumbago    • Lumbar degenerative disc disease    • Metatarsalgia, left foot    • Osteoarthritis of knees, bilateral    • Pelvic fracture    • Primary osteoarthritis of both hips    • Sciatica    • Thoracic spondylosis    • Wears glasses         Past Surgical History:   Procedure Laterality Date   • COLONOSCOPY     • EYE SURGERY     • FRACTURE SURGERY      left wrist; pelvis   • TONSILLECTOMY     • TOTAL HIP ARTHROPLASTY Right 10/3/2017    Procedure: TOTAL HIP ARTHROPLASTY RIGHT;  Surgeon: Prem Hodges MD;  Location: [facility];  Service:    • TUBAL ABDOMINAL LIGATION               PT Ortho       17 1100    Subjective Comments    Subjective Comments Wednesday noticed increased discomfort across her low back into the L hip and occasionally down the R leg.   -RB    Subjective Pain    Able to rate subjective pain? yes  -RB    Pre-Treatment Pain Level 0  -RB    Post-Treatment Pain Level 0  -RB      17 1200    Subjective Comments    Subjective Comments Was sore on , but unsure what may have caused it. Still having discomfort sitting long periods, riding in the car for extended periods. Stairs  becoming less difficult for her.  -RB    Subjective Pain    Able to rate subjective pain? yes  -RB    Pre-Treatment Pain Level 0  -RB    Post-Treatment Pain Level 0  -RB    Stairs Assessment/Treatment    Stairs, Comment Pt ascends/descends 1 flight reciprocally, occasional use of HR, no significant deviation noted.  -RB      User Key  (r) = Recorded By, (t) = Taken By, (c) = Cosigned By    Initials Name Provider Type    ILIR Fowler PT Physical Therapist                            PT Assessment/Plan       11/17/17 1149       PT Assessment    Assessment Comments Pt reports mild discomfort across low back and into L hip occasionally after doing exercises at home. Educated re: exercise regression/progression based on tolerance.   -RB     PT Plan    PT Plan Comments Cont POC as tolerated to maximize strength  -RB       User Key  (r) = Recorded By, (t) = Taken By, (c) = Cosigned By    Initials Name Provider Type    ILIR Fowler, PT Physical Therapist                    Exercises       11/17/17 1100          Subjective Comments    Subjective Comments Wednesday noticed increased discomfort across her low back into the L hip and occasionally down the R leg.   -RB      Subjective Pain    Able to rate subjective pain? yes  -RB      Pre-Treatment Pain Level 0  -RB      Post-Treatment Pain Level 0  -RB      Exercise 1    Exercise Name 1 Ther ex in clinic per flow sheet. Added sit to stand w/ abd using GTB, increased height w/ fwd step ups.  -RB      Time (Minutes) 1 40  -RB        User Key  (r) = Recorded By, (t) = Taken By, (c) = Cosigned By    Initials Name Provider Type    ILIR Fowler, PT Physical Therapist                               PT OP Goals       11/17/17 1150       Time Calculation    PT Goal Re-Cert Due Date 01/05/18  -RB       User Key  (r) = Recorded By, (t) = Taken By, (c) = Cosigned By    Initials Name Provider Type    ILIR Fowler PT Physical Therapist                Therapy Education        11/17/17 1149          Therapy Education    Education Details discussed decreasing HEP frequency to 1x/day vs 2x/day, decreasing reps based on exercise tolerance  -RB      Given HEP  -RB      Program Modified  -RB      How Provided Verbal  -RB      Provided to Patient  -RB      Level of Understanding Verbalized  -RB        User Key  (r) = Recorded By, (t) = Taken By, (c) = Cosigned By    Initials Name Provider Type    RB Courtney Fowler, PT Physical Therapist                Time Calculation:   Start Time: 1100  Total Timed Code Minutes- PT: 40 minute(s)    Therapy Charges for Today     Code Description Service Date Service Provider Modifiers Qty    [id number]  PT THER PROC EA 15 MIN 11/17/2017 Courtney Fowler, PT GP 3                    Courtney Fowler, PT  11/17/2017

## 2017-11-20 ENCOUNTER — HOSPITAL ENCOUNTER (OUTPATIENT)
Dept: PHYSICAL THERAPY | Facility: HOSPITAL | Age: 72
Setting detail: THERAPIES SERIES
Discharge: HOME | End: 2017-11-20

## 2017-11-20 DIAGNOSIS — Z96.641 STATUS POST TOTAL REPLACEMENT OF RIGHT HIP: ICD-10-CM

## 2017-11-20 DIAGNOSIS — Z74.09 IMPAIRED FUNCTIONAL MOBILITY AND ACTIVITY TOLERANCE: Primary | ICD-10-CM

## 2017-11-20 PROCEDURE — 97110 THERAPEUTIC EXERCISES: CPT | Performed by: PHYSICAL THERAPIST

## 2017-11-20 NOTE — THERAPY TREATMENT NOTE
Outpatient Physical Therapy Ortho Treatment Note  [facility]     Patient Name: Jacques Yeboah  : 1945  MRN: [id number]  Today's Date: 2017      Visit Date: 2017    Visit Dx:    ICD-10-CM ICD-9-CM   1. Impaired functional mobility and activity tolerance Z74.09 V49.89   2. Status post total replacement of right hip Z96.641 V43.64       Patient Active Problem List   Diagnosis   • Hyperlipidemia   • Arthritis of right hip   • Status post total replacement of right hip   • Acute blood loss anemia, mild, asymptomatic        Past Medical History:   Diagnosis Date   • Arthritis    • H/O bone density study    • Hip pain    • History of colonoscopy    • History of mammogram    • Impacted cerumen of right ear     Ears needs rinsed out, feels clogged two days.    • Inflammatory arthritis    • Lumbago    • Lumbar degenerative disc disease    • Metatarsalgia, left foot    • Osteoarthritis of knees, bilateral    • Pelvic fracture    • Primary osteoarthritis of both hips    • Sciatica    • Thoracic spondylosis    • Wears glasses         Past Surgical History:   Procedure Laterality Date   • COLONOSCOPY     • EYE SURGERY     • FRACTURE SURGERY      left wrist; pelvis   • TONSILLECTOMY     • TOTAL HIP ARTHROPLASTY Right 10/3/2017    Procedure: TOTAL HIP ARTHROPLASTY RIGHT;  Surgeon: Prem Hodges MD;  Location: [facility];  Service:    • TUBAL ABDOMINAL LIGATION               PT Ortho       17 1000    Subjective Comments    Subjective Comments Hip feeling ok, but continues to have pn in her low back limiting tolerance to standing hip extn/abd and lateral lunges.  -RB    Subjective Pain    Able to rate subjective pain? yes  -RB    Pre-Treatment Pain Level 0  -RB    Post-Treatment Pain Level 0  -RB      17 1100    Subjective Comments    Subjective Comments Wednesday noticed increased discomfort across her low back into the L hip and occasionally down the R leg.   -RB    Subjective Pain    Able  to rate subjective pain? yes  -RB    Pre-Treatment Pain Level 0  -RB    Post-Treatment Pain Level 0  -RB      User Key  (r) = Recorded By, (t) = Taken By, (c) = Cosigned By    Initials Name Provider Type    ILIR Fowler, PT Physical Therapist                            PT Assessment/Plan       11/20/17 1054       PT Assessment    Assessment Comments Pt does well w/ ther ex though progressions limited somewhat by lower back discomfort.   -RB     PT Plan    PT Plan Comments Reassess strength nex visit. May plan d/c to hep  -RB       User Key  (r) = Recorded By, (t) = Taken By, (c) = Cosigned By    Initials Name Provider Type    ILIR Fowler, PT Physical Therapist                    Exercises       11/20/17 1000          Subjective Comments    Subjective Comments Hip feeling ok, but continues to have pn in her low back limiting tolerance to standing hip extn/abd and lateral lunges.  -RB      Subjective Pain    Able to rate subjective pain? yes  -RB      Pre-Treatment Pain Level 0  -RB      Post-Treatment Pain Level 0  -RB      Exercise 1    Exercise Name 1 Ther ex in clinic per flow sheet. Deferred standing hip abd/extn w/ TB, lateral lunges d/t complaint of back pn. Substituted sidestep w/ TB, monsterwalk w/ TB for hip abd strengthening. Ther ex limited by pt request to leave early for personal reasons.  -RB      Time (Minutes) 1 23  -RB        User Key  (r) = Recorded By, (t) = Taken By, (c) = Cosigned By    Initials Name Provider Type    ILIR Fowler, PT Physical Therapist                               PT OP Goals       11/20/17 1056       Time Calculation    PT Goal Re-Cert Due Date 01/05/18  -RB       User Key  (r) = Recorded By, (t) = Taken By, (c) = Cosigned By    Initials Name Provider Type    ILIR Fowler, PT Physical Therapist                Therapy Education       11/20/17 1054          Therapy Education    Education Details recommended deferring standing hip abd/extn w/ TB and reassess  back pn after couple of days  -RB      Given HEP  -RB      Program Modified  -RB      How Provided Verbal  -RB      Provided to Patient  -RB      Level of Understanding Verbalized  -RB        User Key  (r) = Recorded By, (t) = Taken By, (c) = Cosigned By    Initials Name Provider Type    RB Courtney Fowler, PT Physical Therapist                Time Calculation:   Start Time: 1025  Total Timed Code Minutes- PT: 23 minute(s)    Therapy Charges for Today     Code Description Service Date Service Provider Modifiers Qty    [id number]  PT THER PROC EA 15 MIN 11/20/2017 Courtney Fowler, PT GP 2                    Courtney Fowler, PT  11/20/2017

## 2017-11-22 ENCOUNTER — APPOINTMENT (OUTPATIENT)
Dept: PHYSICAL THERAPY | Facility: HOSPITAL | Age: 72
End: 2017-11-22

## 2017-11-27 ENCOUNTER — HOSPITAL ENCOUNTER (OUTPATIENT)
Dept: PHYSICAL THERAPY | Facility: HOSPITAL | Age: 72
Setting detail: THERAPIES SERIES
Discharge: HOME | End: 2017-11-27

## 2017-11-27 DIAGNOSIS — Z74.09 IMPAIRED FUNCTIONAL MOBILITY AND ACTIVITY TOLERANCE: Primary | ICD-10-CM

## 2017-11-27 DIAGNOSIS — Z96.641 STATUS POST TOTAL REPLACEMENT OF RIGHT HIP: ICD-10-CM

## 2017-11-27 PROCEDURE — 97110 THERAPEUTIC EXERCISES: CPT | Performed by: PHYSICAL THERAPIST

## 2017-11-27 PROCEDURE — G8979 MOBILITY GOAL STATUS: HCPCS | Performed by: PHYSICAL THERAPIST

## 2017-11-27 PROCEDURE — G8980 MOBILITY D/C STATUS: HCPCS | Performed by: PHYSICAL THERAPIST

## 2017-11-27 NOTE — THERAPY DISCHARGE NOTE
Outpatient Physical Therapy Ortho Treatment Note/Discharge Summary   [place]     Patient Name: Jacques Yeboah  : 1945  MRN: [id number]  Today's Date: 2017      Visit Date: 2017    Visit Dx:    ICD-10-CM ICD-9-CM   1. Impaired functional mobility and activity tolerance Z74.09 V49.89   2. Status post total replacement of right hip Z96.641 V43.64       Patient Active Problem List   Diagnosis   • Hyperlipidemia   • Arthritis of right hip   • Status post total replacement of right hip   • Acute blood loss anemia, mild, asymptomatic        Past Medical History:   Diagnosis Date   • Arthritis    • H/O bone density study    • Hip pain    • History of colonoscopy    • History of mammogram    • Impacted cerumen of right ear     Ears needs rinsed out, feels clogged two days.    • Inflammatory arthritis    • Lumbago    • Lumbar degenerative disc disease    • Metatarsalgia, left foot    • Osteoarthritis of knees, bilateral    • Pelvic fracture    • Primary osteoarthritis of both hips    • Sciatica    • Thoracic spondylosis    • Wears glasses         Past Surgical History:   Procedure Laterality Date   • COLONOSCOPY     • EYE SURGERY     • FRACTURE SURGERY      left wrist; pelvis   • TONSILLECTOMY     • TOTAL HIP ARTHROPLASTY Right 10/3/2017    Procedure: TOTAL HIP ARTHROPLASTY RIGHT;  Surgeon: Prem Hodges MD;  Location: [facility];  Service:    • TUBAL ABDOMINAL LIGATION               PT Ortho       17 1100    Subjective Comments    Subjective Comments Has returned to walking for exercise which seems to have helped her lower back discomfort. Able to do most activities w/o limitation, but must modify how she picks things up from the floor and how she dons/doffs shoes due to her hip precautions.  -RB    Subjective Pain    Able to rate subjective pain? yes  -RB    Pre-Treatment Pain Level 0  -RB    Post-Treatment Pain Level 0  -RB    Posture/Observations    Posture/Observations Comments  ambulates independently w/o apparent deviation  -RB    Right Hip    Hip Flexion Gross Movement (4+/5) good plus  -RB    Hip Extension Gross Movement (4+/5) good plus  -RB    Hip ABduction Gross Movement (4+/5) good plus  -RB    Hip Ext (Lat) Rotation Gross Movement (4+/5) good plus  -RB    Right Knee    Knee Extension Gross Movement (5/5) normal  -RB    Knee Flexion Gross Movement (5/5) normal  -RB      User Key  (r) = Recorded By, (t) = Taken By, (c) = Cosigned By    Initials Name Provider Type    ILIR Fowler, PT Physical Therapist                            PT Assessment/Plan       11/27/17 1136       PT Assessment    Assessment Comments Pt progressed well w/ PT.  She has made significant progress w/ hip/knee strength, mobility, balance, gait and stair navigation. She has met all goals set for PT and is independent w/ long term HEP for continued strengthening and maintenance.  -RB     Please refer to paper survey for additional self-reported information Yes  -RB     Rehab Potential Good  -RB     Patient/caregiver participated in establishment of treatment plan and goals Yes  -RB     PT Plan    PT Plan Comments d/c to HEP.  -RB       User Key  (r) = Recorded By, (t) = Taken By, (c) = Cosigned By    Initials Name Provider Type    ILIR Fowler, PT Physical Therapist                    Exercises       11/27/17 1100          Subjective Comments    Subjective Comments Has returned to walking for exercise which seems to have helped her lower back discomfort. Able to do most activities w/o limitation, but must modify how she picks things up from the floor and how she dons/doffs shoes due to her hip precautions.  -RB      Subjective Pain    Able to rate subjective pain? yes  -RB      Pre-Treatment Pain Level 0  -RB      Post-Treatment Pain Level 0  -RB      Exercise 1    Exercise Name 1 Performed brief d/c assessment, then continued w/ ther ex as per flow sheet and reviewed final HEP.  -RB      Time (Minutes) 1  30  -RB        User Key  (r) = Recorded By, (t) = Taken By, (c) = Cosigned By    Initials Name Provider Type    ILIR Fowler, PT Physical Therapist                               PT OP Goals       11/27/17 1100       PT Short Term Goals    STG Date to Achieve 11/24/17  -RB     STG 1 Pt to be compliant w/ progressed HEP for strengthening, flexibility  -RB     STG 1 Progress Met  -RB     STG 2 Pt to ambulate household and community distances w/o AD w/ minimal to no deviation.  -RB     STG 2 Progress Met  -RB     STG 3 Pt to improve R quad strength to 5/5.  -RB     STG 3 Progress Met  -RB     Long Term Goals    LTG Date to Achieve 12/15/17  -RB     LTG 1 Pt to be independent w/ long term HEP for strengthening  -RB     LTG 1 Progress Met  -RB     LTG 2 Pt to ascend/descend flight of stairs reciprocally w/ minimal deviation, with or without use of HR  -RB     LTG 2 Progress Met  -RB     LTG 3 Pt to improve R hip strength by at least 1/2 grade in all planes.  -RB     LTG 3 Progress Met  -RB     LTG 4 Pt to improve LEFS score to at least 55/80 to reflect improved strength and functional mobility.  -RB     LTG 4 Progress Met  -RB       User Key  (r) = Recorded By, (t) = Taken By, (c) = Cosigned By    Initials Name Provider Type    ILIR Fowler, PT Physical Therapist                Therapy Education       11/27/17 1136          Therapy Education    Education Details issued updated HEP as outlined in chart and discussed safe exercise progressions.  -RB      Given HEP  -RB      Program Progressed  -RB      How Provided Verbal;Demonstration;Written  -RB      Provided to Patient  -RB      Level of Understanding Teach back education performed  -RB        User Key  (r) = Recorded By, (t) = Taken By, (c) = Cosigned By    Initials Name Provider Type    ILIR Fowler, PT Physical Therapist                Outcome Measures       11/27/17 1100          Lower Extremity Functional Index    Any of your usual work, housework or  school activities 4  -RB      Your usual hobbies, recreational or sporting activities 3  -RB      Getting into or out of the bath 4  -RB      Walking between rooms 4  -RB      Putting on your shoes or socks 3  -RB      Squatting 3  -RB      Lifting an object, like a bag of groceries from the floor 3  -RB      Performing light activities around your home 4  -RB      Performing heavy activities around your home 3  -RB      Getting into or out of a car 4  -RB      Walking 2 blocks 4  -RB      Walking a mile 2  -RB      Going up or down 10 stairs (about 1 flight of stairs) 4  -RB      Standing for 1 hour 3  -RB      Sitting for 1 hour 4  -RB      Running on even ground 1  -RB      Running on uneven ground 1  -RB      Making sharp turns while running fast 1  -RB      Hopping 2  -RB      Rolling over in bed 3  -RB      Total 60  -RB      Functional Assessment    Outcome Measure Options Lower Extremity Functional Scale (LEFS)  -RB        User Key  (r) = Recorded By, (t) = Taken By, (c) = Cosigned By    Initials Name Provider Type    RB Courtney Fowler, PT Physical Therapist            Time Calculation:   Start Time: 1045  Total Timed Code Minutes- PT: 30 minute(s)    Therapy Charges for Today     Code Description Service Date Service Provider Modifiers Qty    72280306275 HC PT MOBILITY PROJECTED 11/27/2017 Courtney Fowler, PT GP, CI 1    50282685972 HC PT MOBILITY DISCHARGE 11/27/2017 Courtney Fowler, PT GP, CI 1    95368859008 HC PT THER PROC EA 15 MIN 11/27/2017 Courtney Fowler, PT GP 2          PT G-Codes  PT Professional Judgement Used?: Yes  Outcome Measure Options: Lower Extremity Functional Scale (LEFS)  Score: 60/80  Functional Limitation: Mobility: Walking and moving around  Mobility: Walking and Moving Around Goal Status (): At least 1 percent but less than 20 percent impaired, limited or restricted  Mobility: Walking and Moving Around Discharge Status (): At least 1 percent but less than 20 percent  impaired, limited or restricted     OP PT Discharge Summary  Date of Discharge: 11/27/17  Reason for Discharge: All goals achieved  Outcomes Achieved: Able to achieve all goals within established timeline  Discharge Destination: Home with home program      Courtney Fowler, PT  11/27/2017

## 2017-11-30 ENCOUNTER — APPOINTMENT (OUTPATIENT)
Dept: PHYSICAL THERAPY | Facility: HOSPITAL | Age: 72
End: 2017-11-30

## 2017-12-14 ENCOUNTER — OFFICE VISIT (OUTPATIENT)
Dept: FAMILY MEDICINE CLINIC | Facility: CLINIC | Age: 72
End: 2017-12-14

## 2017-12-14 VITALS
WEIGHT: 150 LBS | TEMPERATURE: 98 F | BODY MASS INDEX: 25.27 KG/M2 | HEART RATE: 80 BPM | RESPIRATION RATE: 16 BRPM | SYSTOLIC BLOOD PRESSURE: 100 MMHG | DIASTOLIC BLOOD PRESSURE: 60 MMHG

## 2017-12-14 DIAGNOSIS — J06.9 ACUTE URI: Primary | ICD-10-CM

## 2017-12-14 PROCEDURE — 99213 OFFICE O/P EST LOW 20 MIN: CPT | Performed by: FAMILY MEDICINE

## 2017-12-14 RX ORDER — AZITHROMYCIN 250 MG/1
TABLET, FILM COATED ORAL
Qty: 5 TABLET | Refills: 0 | OUTPATIENT
Start: 2017-12-14 | End: 2018-02-16

## 2017-12-14 RX ORDER — GUAIFENESIN AND CODEINE PHOSPHATE 100; 10 MG/5ML; MG/5ML
5 SOLUTION ORAL 3 TIMES DAILY PRN
Qty: 118 ML | Refills: 0 | Status: SHIPPED | OUTPATIENT
Start: 2017-12-14 | End: 2018-07-27

## 2017-12-14 NOTE — PROGRESS NOTES
Subjective   Jacques Yeboah is a 72 y.o. female.     History of Present Illness   Three days of raspy voice, cough and head drainage.  Resting poor with cough. No fever.   The following portions of the patient's history were reviewed and updated as appropriate: allergies, current medications, past family history, past medical history, past social history, past surgical history and problem list.    Review of Systems   Constitutional: Positive for fatigue.   HENT: Positive for congestion.    Eyes: Negative.    Respiratory: Positive for cough and wheezing.    Cardiovascular: Negative.    Gastrointestinal: Negative.    Endocrine: Negative.    Musculoskeletal: Positive for arthralgias (hip replacement).   Skin: Negative.        Objective   Physical Exam   Constitutional: She appears well-developed.   HENT:   Mouth/Throat: Oropharynx is clear and moist.   Head congestion.    Neck: Neck supple.   Pulmonary/Chest:   Dry cough.   Lymphadenopathy:     She has no cervical adenopathy.   Vitals reviewed.      Assessment/Plan   Jacques was seen today for uri.    Diagnoses and all orders for this visit:    Acute URI  -     azithromycin (ZITHROMAX) 250 MG tablet; Take 2 tablets the first day, then 1 tablet daily for 4 days.  -     guaifenesin-codeine (GUAIFENESIN AC) 100-10 MG/5ML liquid; Take 5 mL by mouth 3 (Three) Times a Day As Needed for Cough.

## 2017-12-18 ENCOUNTER — OFFICE VISIT (OUTPATIENT)
Dept: ORTHOPEDIC SURGERY | Facility: CLINIC | Age: 72
End: 2017-12-18

## 2017-12-18 DIAGNOSIS — Z96.641 STATUS POST TOTAL REPLACEMENT OF RIGHT HIP: Primary | ICD-10-CM

## 2017-12-18 DIAGNOSIS — Z47.89 ORTHOPEDIC AFTERCARE: ICD-10-CM

## 2017-12-18 PROCEDURE — 99024 POSTOP FOLLOW-UP VISIT: CPT | Performed by: ORTHOPAEDIC SURGERY

## 2017-12-18 NOTE — PROGRESS NOTES
[facility] [place] [facility]opaedic Surgery Clinic Note    Subjective     Chief Complaint   Patient presents with   • Right Hip - Follow-up     8 week follow up, 11 weeks status post: Right Total Hip Arthroplasty 10/03/17        HPI    Jacques Yeboah is a 72 y.o. female. She follows up today status post right total hip arthroplasty.  She is doing well today.  100% improvement compared to her preoperative symptoms.  She is fully ambulatory without external aids.      Patient Active Problem List   Diagnosis   • Hyperlipidemia   • Arthritis of right hip   • Status post total replacement of right hip   • Acute blood loss anemia, mild, asymptomatic     Past Medical History:   Diagnosis Date   • Arthritis    • H/O bone density study    • Hip pain    • History of colonoscopy    • History of mammogram    • Impacted cerumen of right ear     Ears needs rinsed out, feels clogged two days.    • Inflammatory arthritis    • Lumbago    • Lumbar degenerative disc disease    • Metatarsalgia, left foot    • Osteoarthritis of knees, bilateral    • Pelvic fracture    • Primary osteoarthritis of both hips    • Sciatica    • Thoracic spondylosis    • Wears glasses       Past Surgical History:   Procedure Laterality Date   • COLONOSCOPY     • EYE SURGERY     • FRACTURE SURGERY      left wrist; pelvis   • TONSILLECTOMY     • TOTAL HIP ARTHROPLASTY Right 10/3/2017    Procedure: TOTAL HIP ARTHROPLASTY RIGHT;  Surgeon: Prem Hodges MD;  Location: [facility];  Service:    • TUBAL ABDOMINAL LIGATION        Family History   Problem Relation Age of Onset   • Heart failure Mother      CHF   • Osteoarthritis Mother    • Heart attack Father      Acute MI   • Stroke Father      Stroke syndrome     Social History     Social History   • Marital status:      Spouse name: N/A   • Number of children: N/A   • Years of education: N/A     Occupational History   • Not on file.     Social History Main Topics   • Smoking status: Never Smoker   • Smokeless  tobacco: Never Used   • Alcohol use No   • Drug use: No   • Sexual activity: Defer     Other Topics Concern   • Not on file     Social History Narrative      Current Outpatient Prescriptions on File Prior to Visit   Medication Sig Dispense Refill   • aspirin  MG EC tablet Take 1 tablet by mouth Daily. For 1 month 30 tablet 0   • azithromycin (ZITHROMAX) 250 MG tablet Take 2 tablets the first day, then 1 tablet daily for 4 days. 5 tablet 0   • calcium carbonate (OS-SKYLER) 600 MG tablet Take 600 mg by mouth 2 (Two) Times a Day With Meals.     • doxycycline (MONODOX) 100 MG capsule Take 1 capsule by mouth 2 (Two) Times a Day for 10 days. 20 capsule 0   • guaifenesin-codeine (GUAIFENESIN AC) 100-10 MG/5ML liquid Take 5 mL by mouth 3 (Three) Times a Day As Needed for Cough. 118 mL 0   • predniSONE (DELTASONE) 50 MG tablet Take 1 tablet by mouth Daily for 5 days. 5 tablet 0   • simvastatin (ZOCOR) 20 MG tablet Take 20 mg by mouth Every Night.       No current facility-administered medications on file prior to visit.       No Known Allergies     Review of Systems     Objective      Physical Exam  There were no vitals taken for this visit.    There is no height or weight on file to calculate BMI.    General:   Mental Status:  Alert   Appearance: Cooperative, in no acute distress   Build and Nutrition: Well-nourished and well developed female   Orientation: Alert and oriented to person, place and time   Posture: Normal   Gait: Normal    Integument:   Right hip: Wound is well-healed    Lower Extremity:   Right Hip:    Tenderness:  None    Swelling:  None    Crepitus:  None    Range of motion:  External Rotation: 30°       Internal Rotation: 30°       Flexion:  100°       Extension:  0°    Deformities:  None  Functional testing: [facility]    No leg length discrepancy        Assessment and Plan     Jacques was seen today for follow-up.    Diagnoses and all orders for this visit:    Status post total replacement  of right hip    Orthopedic aftercare        I reviewed my findings with patient today.  Her left total hip arthroplasties functioning well.  I will see her back in 4 months with x-rays, but sooner for any problems.    Return in about 4 months (around 4/18/2018) for Recheck with X-Rays.        Prem Hodges MD  12/18/17  11:00 AM

## 2017-12-23 RX ORDER — SIMVASTATIN 20 MG
TABLET ORAL
Qty: 90 TABLET | Refills: 0 | Status: SHIPPED | OUTPATIENT
Start: 2017-12-23 | End: 2017-12-27 | Stop reason: SDUPTHER

## 2017-12-27 ENCOUNTER — TELEPHONE (OUTPATIENT)
Dept: FAMILY MEDICINE CLINIC | Facility: CLINIC | Age: 72
End: 2017-12-27

## 2017-12-27 RX ORDER — SIMVASTATIN 20 MG
20 TABLET ORAL NIGHTLY
Qty: 90 TABLET | Refills: 2 | Status: SHIPPED | OUTPATIENT
Start: 2017-12-27 | End: 2018-03-30 | Stop reason: SDUPTHER

## 2017-12-27 NOTE — TELEPHONE ENCOUNTER
----- Message from Lala Carrion sent at 12/27/2017  9:13 AM EST -----  Contact: [phone]  Pt would like a refill on Simvastatin 20 mg 90 day supply sent to [facility] on [address].    Rx has been sent in.  SELENE Marie

## 2018-01-08 ENCOUNTER — TRANSCRIBE ORDERS (OUTPATIENT)
Dept: ADMINISTRATIVE | Facility: HOSPITAL | Age: 73
End: 2018-01-08

## 2018-01-08 DIAGNOSIS — Z12.31 VISIT FOR SCREENING MAMMOGRAM: Primary | ICD-10-CM

## 2018-02-15 ENCOUNTER — APPOINTMENT (OUTPATIENT)
Dept: OTHER | Facility: HOSPITAL | Age: 73
End: 2018-02-15

## 2018-02-15 ENCOUNTER — HOSPITAL ENCOUNTER (OUTPATIENT)
Dept: MAMMOGRAPHY | Facility: HOSPITAL | Age: 73
Discharge: HOME | End: 2018-02-15
Admitting: FAMILY MEDICINE

## 2018-02-15 DIAGNOSIS — Z12.31 VISIT FOR SCREENING MAMMOGRAM: ICD-10-CM

## 2018-02-15 PROCEDURE — 77063 BREAST TOMOSYNTHESIS BI: CPT

## 2018-02-15 PROCEDURE — 77063 BREAST TOMOSYNTHESIS BI: CPT | Performed by: RADIOLOGY

## 2018-02-15 PROCEDURE — 77067 SCR MAMMO BI INCL CAD: CPT | Performed by: RADIOLOGY

## 2018-02-15 PROCEDURE — 77067 SCR MAMMO BI INCL CAD: CPT

## 2018-03-01 ENCOUNTER — HOSPITAL ENCOUNTER (OUTPATIENT)
Dept: ULTRASOUND IMAGING | Facility: HOSPITAL | Age: 73
Discharge: HOME | End: 2018-03-01
Admitting: FAMILY MEDICINE

## 2018-03-01 DIAGNOSIS — R92.8 ABNORMAL MAMMOGRAM: ICD-10-CM

## 2018-03-01 PROCEDURE — 76642 ULTRASOUND BREAST LIMITED: CPT | Performed by: RADIOLOGY

## 2018-03-01 PROCEDURE — 76642 ULTRASOUND BREAST LIMITED: CPT

## 2018-03-26 RX ORDER — SIMVASTATIN 20 MG
TABLET ORAL
Qty: 90 TABLET | Refills: 0 | Status: SHIPPED | OUTPATIENT
Start: 2018-03-26 | End: 2018-06-27 | Stop reason: SDUPTHER

## 2018-03-30 RX ORDER — SIMVASTATIN 20 MG
20 TABLET ORAL NIGHTLY
Qty: 90 TABLET | Refills: 2 | Status: SHIPPED | OUTPATIENT
Start: 2018-03-30 | End: 2019-04-09 | Stop reason: SDUPTHER

## 2018-04-16 ENCOUNTER — OFFICE VISIT (OUTPATIENT)
Dept: ORTHOPEDIC SURGERY | Facility: CLINIC | Age: 73
End: 2018-04-16

## 2018-04-16 VITALS
BODY MASS INDEX: 25.23 KG/M2 | HEART RATE: 73 BPM | WEIGHT: 151.46 LBS | SYSTOLIC BLOOD PRESSURE: 129 MMHG | DIASTOLIC BLOOD PRESSURE: 74 MMHG | HEIGHT: 65 IN

## 2018-04-16 DIAGNOSIS — Z09 POSTOPERATIVE EXAMINATION: ICD-10-CM

## 2018-04-16 DIAGNOSIS — Z96.641 STATUS POST TOTAL REPLACEMENT OF RIGHT HIP: Primary | ICD-10-CM

## 2018-04-16 PROCEDURE — 99213 OFFICE O/P EST LOW 20 MIN: CPT | Performed by: ORTHOPAEDIC SURGERY

## 2018-04-16 NOTE — PROGRESS NOTES
[facility] [place] [facility] [place]    Subjective     Chief Complaint   Patient presents with   • Follow-up     4 months f/u; 6 months s/p Right Total Hip Arthroplasty 10/03/17        HPI    Jacques Yeboah is a 73 y.o. female. She follows up today for her right total hip arthroplasty.  She is doing well today.  Ambulating without external aids.  99.5% improvement compared to her preoperative symptoms.      Patient Active Problem List   Diagnosis   • Hyperlipidemia   • Arthritis of right hip   • Status post total replacement of right hip   • Acute blood loss anemia, mild, asymptomatic     Past Medical History:   Diagnosis Date   • Arthritis    • H/O bone density study    • Hip pain    • History of colonoscopy    • History of mammogram    • Impacted cerumen of right ear     Ears needs rinsed out, feels clogged two days.    • Inflammatory arthritis    • Lumbago    • Lumbar degenerative disc disease    • Metatarsalgia, left foot    • Osteoarthritis of knees, bilateral    • Pelvic fracture    • Primary osteoarthritis of both hips    • Sciatica    • Thoracic spondylosis    • Wears glasses       Past Surgical History:   Procedure Laterality Date   • COLONOSCOPY     • EYE SURGERY     • FRACTURE SURGERY      left wrist; pelvis   • TONSILLECTOMY     • TOTAL HIP ARTHROPLASTY Right 10/3/2017    Procedure: TOTAL HIP ARTHROPLASTY RIGHT;  Surgeon: Prem Hodges MD;  Location: [facility];  Service:    • TUBAL ABDOMINAL LIGATION        Family History   Problem Relation Age of Onset   • Heart failure Mother      CHF   • Osteoarthritis Mother    • Heart attack Father      Acute MI   • Stroke Father      Stroke syndrome   • Ovarian cancer Neg Hx      Social History     Social History   • Marital status:      Spouse name: N/A   • Number of children: N/A   • Years of education: N/A     Occupational History   • Not on file.     Social History Main Topics   • Smoking status: Never Smoker   • Smokeless tobacco: Never Used    • Alcohol use No   • Drug use: No   • Sexual activity: Defer     Other Topics Concern   • Not on file     Social History Narrative   • No narrative on file      Current Outpatient Prescriptions on File Prior to Visit   Medication Sig Dispense Refill   • aspirin  MG EC tablet Take 1 tablet by mouth Daily. For 1 month 30 tablet 0   • calcium carbonate (OS-SKYLER) 600 MG tablet Take 600 mg by mouth 2 (Two) Times a Day With Meals.     • guaifenesin-codeine (GUAIFENESIN AC) 100-10 MG/5ML liquid Take 5 mL by mouth 3 (Three) Times a Day As Needed for Cough. 118 mL 0   • simvastatin (ZOCOR) 20 MG tablet Take 20 mg by mouth Every Night.     • simvastatin (ZOCOR) 20 MG tablet TAKE 1 TABLET BY MOUTH EVERY NIGHT 90 tablet 0   • simvastatin (ZOCOR) 20 MG tablet Take 1 tablet by mouth Every Night. 90 tablet 2     No current facility-administered medications on file prior to visit.       No Known Allergies     Review of Systems   Constitutional: Negative for activity change, appetite change, chills, diaphoresis, fatigue, fever and unexpected weight change.   HENT: Negative for congestion, dental problem, drooling, ear discharge, ear pain, facial swelling, hearing loss, mouth sores, nosebleeds, postnasal drip, rhinorrhea, sinus pressure, sneezing, sore throat, tinnitus, trouble swallowing and voice change.    Eyes: Negative for photophobia, pain, discharge, redness, itching and visual disturbance.   Respiratory: Negative for apnea, cough, choking, chest tightness, shortness of breath, wheezing and stridor.    Cardiovascular: Negative for chest pain, palpitations and leg swelling.   Gastrointestinal: Negative for abdominal distention, abdominal pain, anal bleeding, blood in stool, constipation, diarrhea, nausea, rectal pain and vomiting.   Endocrine: Negative for cold intolerance, heat intolerance, polydipsia, polyphagia and polyuria.   Genitourinary: Negative for decreased urine volume, difficulty urinating, dysuria, enuresis,  "flank pain, frequency, genital sores, hematuria and urgency.   Musculoskeletal: Positive for arthralgias. Negative for back pain, gait problem, joint swelling, myalgias, neck pain and neck stiffness.        S/p total hip arthroplasty   Skin: Negative for color change, pallor, rash and wound.   Allergic/Immunologic: Negative for environmental allergies, food allergies and immunocompromised state.   Neurological: Negative for dizziness, tremors, seizures, syncope, facial asymmetry, speech difficulty, weakness, light-headedness, numbness and headaches.   Hematological: Negative for adenopathy. Does not bruise/bleed easily.   Psychiatric/Behavioral: Negative for agitation, behavioral problems, confusion, decreased concentration, dysphoric mood, hallucinations, self-injury, sleep disturbance and suicidal ideas. The patient is not nervous/anxious and is not hyperactive.         Objective      Physical Exam  /74   Pulse 73   Ht 165 cm (64.96\")   Wt 68.7 kg (151 lb 7.3 oz)   BMI 25.23 kg/m²     Body mass index is 25.23 kg/m².    General:   Mental Status:  Alert   Appearance: Cooperative, in no acute distress   Build and Nutrition: Well-nourished and well developed female   Orientation: Alert and oriented to person, place and time   Posture: Normal   Gait: Normal    Integument:   Right hip: Wound is well-healed with no signs of infection    Lower Extremity:   Right Hip:    Tenderness:  None    Swelling:  None    Crepitus:  None    Range of motion:  External Rotation: 30°       Internal Rotation: 30°       Flexion:  100°       Extension:  0°    Deformities:  None  Functional testing: Negative [facility]    No leg length discrepancy      Imaging/Studies  Imaging Results (last 24 hours)     Procedure Component Value Units Date/Time    XR Hip With or Without Pelvis 1 View Right [[id number]] Resulted:  04/16/18 1005     Updated:  04/16/18 1007    Narrative:       Right Hip Radiographs  Indication: status-post right " total hip arthroplasty  Views: low AP pelvis and lateral of the right hip    Comparison: no change compared to prior study, 10/23/2017     Findings:   The components are well aligned, with no signs of loosening or failure.            Assessment and Plan     Jacques was seen today for follow-up.    Diagnoses and all orders for this visit:    Status post total replacement of right hip  -     XR Hip With or Without Pelvis 1 View Right    Postoperative examination        I reviewed my findings with patient today.  Her right total hip arthroplasty is functioning well.  I will see her back in 6 months with an x-ray, but sooner for any problems.  She is pleased with results.    Return in about 6 months (around 10/16/2018) for Recheck with X-Rays.      Medical Decision Making  Data/Risk: radiology tests and independent visualization of imaging, lab tests, or EMG/NCV      Prem Hodges MD  04/16/18  10:11 AM

## 2018-06-28 RX ORDER — SIMVASTATIN 20 MG
20 TABLET ORAL NIGHTLY
Qty: 90 TABLET | Refills: 2 | Status: SHIPPED | OUTPATIENT
Start: 2018-06-28 | End: 2018-07-27 | Stop reason: SDUPTHER

## 2018-06-29 RX ORDER — SIMVASTATIN 20 MG
TABLET ORAL
Qty: 90 TABLET | Refills: 0 | Status: SHIPPED | OUTPATIENT
Start: 2018-06-29 | End: 2018-07-27 | Stop reason: SDUPTHER

## 2018-07-27 ENCOUNTER — OFFICE VISIT (OUTPATIENT)
Dept: FAMILY MEDICINE CLINIC | Facility: CLINIC | Age: 73
End: 2018-07-27

## 2018-07-27 VITALS
BODY MASS INDEX: 24.49 KG/M2 | OXYGEN SATURATION: 99 % | DIASTOLIC BLOOD PRESSURE: 86 MMHG | RESPIRATION RATE: 16 BRPM | SYSTOLIC BLOOD PRESSURE: 140 MMHG | WEIGHT: 147 LBS | HEART RATE: 70 BPM | TEMPERATURE: 97.9 F

## 2018-07-27 DIAGNOSIS — E78.5 HYPERLIPIDEMIA, UNSPECIFIED HYPERLIPIDEMIA TYPE: Primary | ICD-10-CM

## 2018-07-27 LAB
ALBUMIN SERPL-MCNC: 4.33 G/DL (ref 3.2–4.8)
ALBUMIN/GLOB SERPL: 1.7 G/DL (ref 1.5–2.5)
ALP SERPL-CCNC: 67 U/L (ref 25–100)
ALT SERPL W P-5'-P-CCNC: 17 U/L (ref 7–40)
ANION GAP SERPL CALCULATED.3IONS-SCNC: 6 MMOL/L (ref 3–11)
ARTICHOKE IGE QN: 97 MG/DL (ref 0–130)
AST SERPL-CCNC: 21 U/L (ref 0–33)
BILIRUB SERPL-MCNC: 0.5 MG/DL (ref 0.3–1.2)
BUN BLD-MCNC: 22 MG/DL (ref 9–23)
BUN/CREAT SERPL: 30.1 (ref 7–25)
CALCIUM SPEC-SCNC: 9.7 MG/DL (ref 8.7–10.4)
CHLORIDE SERPL-SCNC: 106 MMOL/L (ref 99–109)
CHOLEST SERPL-MCNC: 160 MG/DL (ref 0–200)
CO2 SERPL-SCNC: 31 MMOL/L (ref 20–31)
CREAT BLD-MCNC: 0.73 MG/DL (ref 0.6–1.3)
GFR SERPL CREATININE-BSD FRML MDRD: 78 ML/MIN/1.73
GLOBULIN UR ELPH-MCNC: 2.6 GM/DL
GLUCOSE BLD-MCNC: 92 MG/DL (ref 70–100)
HDLC SERPL-MCNC: 56 MG/DL (ref 40–60)
POTASSIUM BLD-SCNC: 4.6 MMOL/L (ref 3.5–5.5)
PROT SERPL-MCNC: 6.9 G/DL (ref 5.7–8.2)
SODIUM BLD-SCNC: 143 MMOL/L (ref 132–146)
TRIGL SERPL-MCNC: 77 MG/DL (ref 0–150)

## 2018-07-27 PROCEDURE — 80053 COMPREHEN METABOLIC PANEL: CPT | Performed by: FAMILY MEDICINE

## 2018-07-27 PROCEDURE — 99213 OFFICE O/P EST LOW 20 MIN: CPT | Performed by: FAMILY MEDICINE

## 2018-07-27 PROCEDURE — 80061 LIPID PANEL: CPT | Performed by: FAMILY MEDICINE

## 2018-07-27 PROCEDURE — 36415 COLL VENOUS BLD VENIPUNCTURE: CPT | Performed by: FAMILY MEDICINE

## 2018-07-27 NOTE — PROGRESS NOTES
Subjective   Jacques Yeboah is a 73 y.o. female.     History of Present Illness   Here for cholesterol check.  No problems.    discovered to have groin adenopathy (lymphoma).   The following portions of the patient's history were reviewed and updated as appropriate: allergies, current medications, past family history, past medical history, past social history, past surgical history and problem list.    Review of Systems   Constitutional: Negative.    Respiratory: Negative.    Cardiovascular: Negative.    Gastrointestinal: Negative.        Objective   Physical Exam   Constitutional: She appears well-developed.   Pulmonary/Chest: Effort normal.   Musculoskeletal: She exhibits no edema.   Neurological: She is alert.   Skin: Skin is dry.   Vitals reviewed.      Assessment/Plan   Jacques was seen today for hyperlipidemia.    Diagnoses and all orders for this visit:    Hyperlipidemia, unspecified hyperlipidemia type  -     Lipid Panel  -     Comprehensive Metabolic Panel

## 2018-10-15 ENCOUNTER — OFFICE VISIT (OUTPATIENT)
Dept: ORTHOPEDIC SURGERY | Facility: CLINIC | Age: 73
End: 2018-10-15

## 2018-10-15 VITALS — OXYGEN SATURATION: 98 % | WEIGHT: 145.5 LBS | BODY MASS INDEX: 24.24 KG/M2 | HEART RATE: 80 BPM | HEIGHT: 65 IN

## 2018-10-15 DIAGNOSIS — Z09 POSTOPERATIVE EXAMINATION: ICD-10-CM

## 2018-10-15 DIAGNOSIS — Z96.641 STATUS POST RIGHT HIP REPLACEMENT: Primary | ICD-10-CM

## 2018-10-15 PROCEDURE — 99213 OFFICE O/P EST LOW 20 MIN: CPT | Performed by: ORTHOPAEDIC SURGERY

## 2018-10-15 NOTE — PROGRESS NOTES
[facility] [place] [facility] Note    Subjective     Chief Complaint   Patient presents with   • Right Hip - Follow-up     6 month f/u  1 year post Right Total Hip Arthroplasty 10/03/17        HPI    Jacques Yeboah is a 73 y.o. female.  She follows up today for her right total hip arthroplasty.  She's doing well today, fully ambulatory without external aids.  100% relief compared to her preoperative symptoms.  She is pleased with results.      Patient Active Problem List   Diagnosis   • Hyperlipidemia   • Arthritis of right hip   • Status post total replacement of right hip   • Acute blood loss anemia, mild, asymptomatic     Past Medical History:   Diagnosis Date   • Arthritis    • H/O bone density study    • Hip pain    • History of colonoscopy    • History of mammogram    • Impacted cerumen of right ear     Ears needs rinsed out, feels clogged two days.    • Inflammatory arthritis    • Lumbago    • Lumbar degenerative disc disease    • Metatarsalgia, left foot    • Osteoarthritis of knees, bilateral    • Pelvic fracture (CMS/HCC)    • Primary osteoarthritis of both hips    • Sciatica    • Thoracic spondylosis    • Wears glasses       Past Surgical History:   Procedure Laterality Date   • COLONOSCOPY     • EYE SURGERY     • FRACTURE SURGERY      left wrist; pelvis   • TONSILLECTOMY     • TOTAL HIP ARTHROPLASTY Right 10/3/2017    Procedure: TOTAL HIP ARTHROPLASTY RIGHT;  Surgeon: Prem Hodges MD;  Location: [facility] OR;  Service:    • TUBAL ABDOMINAL LIGATION        Family History   Problem Relation Age of Onset   • Heart failure Mother         CHF   • Osteoarthritis Mother    • Heart attack Father         Acute MI   • Stroke Father         Stroke syndrome   • Ovarian cancer Neg Hx      Social History     Social History   • Marital status:      Spouse name: N/A   • Number of children: N/A   • Years of education: N/A     Occupational History   • Not on file.     Social History Main Topics   •  Smoking status: Never Smoker   • Smokeless tobacco: Never Used   • Alcohol use No   • Drug use: No   • Sexual activity: Defer     Other Topics Concern   • Not on file     Social History Narrative   • No narrative on file      Current Outpatient Prescriptions on File Prior to Visit   Medication Sig Dispense Refill   • aspirin  MG EC tablet Take 1 tablet by mouth Daily. For 1 month 30 tablet 0   • calcium carbonate (OS-SKYLER) 600 MG tablet Take 600 mg by mouth 2 (Two) Times a Day With Meals.     • simvastatin (ZOCOR) 20 MG tablet Take 1 tablet by mouth Every Night. 90 tablet 2     No current facility-administered medications on file prior to visit.       No Known Allergies     Review of Systems   Constitutional: Negative for activity change, appetite change, chills, diaphoresis, fatigue, fever and unexpected weight change.   HENT: Negative for congestion, dental problem, drooling, ear discharge, ear pain, facial swelling, hearing loss, mouth sores, nosebleeds, postnasal drip, rhinorrhea, sinus pressure, sneezing, sore throat, tinnitus, trouble swallowing and voice change.    Eyes: Negative for photophobia, pain, discharge, redness, itching and visual disturbance.   Respiratory: Negative for apnea, cough, choking, chest tightness, shortness of breath, wheezing and stridor.    Cardiovascular: Negative for chest pain, palpitations and leg swelling.   Gastrointestinal: Negative for abdominal distention, abdominal pain, anal bleeding, blood in stool, constipation, diarrhea, nausea, rectal pain and vomiting.   Endocrine: Negative for cold intolerance, heat intolerance, polydipsia, polyphagia and polyuria.   Genitourinary: Negative for decreased urine volume, difficulty urinating, dysuria, enuresis, flank pain, frequency, genital sores, hematuria and urgency.   Musculoskeletal: Positive for arthralgias and back pain. Negative for gait problem, joint swelling, myalgias, neck pain and neck stiffness.   Skin: Negative for  "color change, pallor, rash and wound.   Allergic/Immunologic: Negative for environmental allergies, food allergies and immunocompromised state.   Neurological: Negative for dizziness, tremors, seizures, syncope, facial asymmetry, speech difficulty, weakness, light-headedness, numbness and headaches.   Hematological: Negative for adenopathy. Does not bruise/bleed easily.   Psychiatric/Behavioral: Negative for agitation, behavioral problems, confusion, decreased concentration, dysphoric mood, hallucinations, self-injury, sleep disturbance and suicidal ideas. The patient is not nervous/anxious and is not hyperactive.         Objective      Physical Exam  Pulse 80   Ht 165 cm (64.96\")   Wt 66 kg (145 lb 8.1 oz)   SpO2 98%   BMI 24.24 kg/m²     Body mass index is 24.24 kg/m².    General:   Mental Status:  Alert   Appearance: Cooperative, in no acute distress   Build and Nutrition: Well-nourished and well developed female   Orientation: Alert and oriented to person, place and time   Posture: Normal   Gait: Normal    Integument:   Right hip: Wound is well-healed with no signs of infection    Lower Extremity:   Right Hip:    Tenderness:  None    Swelling:  None    Crepitus:  None    Range of motion:  External Rotation: 30°       Internal Rotation: 30°       Flexion:  100°       Extension:  0°    Deformities:  None  Functional testing: Negative Stinchfield    No leg length discrepancy      Imaging/Studies      Imaging Results (last 24 hours)     Procedure Component Value Units Date/Time    XR Hip With or Without Pelvis 1 View Right [[id number]] Resulted:  10/15/18 1032     Updated:  10/15/18 1032    Narrative:       Right Hip Radiographs  Indication: status-post right total hip arthroplasty  Views: low AP pelvis and lateral of the right hip    Comparison: no change compared to prior study, 4/6/2018    Findings:   The components are well aligned, with no signs of loosening or failure.          Assessment and Plan "     Jacques was seen today for follow-up.    Diagnoses and all orders for this visit:    Status post right hip replacement  -     XR Hip With or Without Pelvis 1 View Right    Postoperative examination        I reviewed my findings with patient today.  Her right total hip arthroplasty is functioning well, and she is pleased with results.  I will see her back in 4 years with an x-ray, but sooner for any problems.    Return in about 4 years (around 10/15/2022).      Medical Decision Making  Data/Risk: radiology tests and independent visualization of imaging, lab tests, or EMG/NCV      Prem Hodges MD  10/15/18  10:37 AM

## 2019-01-18 ENCOUNTER — TRANSCRIBE ORDERS (OUTPATIENT)
Dept: ADMINISTRATIVE | Facility: HOSPITAL | Age: 74
End: 2019-01-18

## 2019-01-18 DIAGNOSIS — Z12.31 VISIT FOR SCREENING MAMMOGRAM: Primary | ICD-10-CM

## 2019-03-06 ENCOUNTER — APPOINTMENT (OUTPATIENT)
Dept: MAMMOGRAPHY | Facility: HOSPITAL | Age: 74
End: 2019-03-06
Attending: OBSTETRICS & GYNECOLOGY

## 2019-03-26 ENCOUNTER — OFFICE VISIT (OUTPATIENT)
Dept: FAMILY MEDICINE CLINIC | Facility: CLINIC | Age: 74
End: 2019-03-26

## 2019-03-26 VITALS
WEIGHT: 154.2 LBS | TEMPERATURE: 96.6 F | OXYGEN SATURATION: 98 % | DIASTOLIC BLOOD PRESSURE: 78 MMHG | SYSTOLIC BLOOD PRESSURE: 128 MMHG | HEIGHT: 65 IN | HEART RATE: 68 BPM | BODY MASS INDEX: 25.69 KG/M2 | RESPIRATION RATE: 18 BRPM

## 2019-03-26 DIAGNOSIS — Z13.220 LIPID SCREENING: ICD-10-CM

## 2019-03-26 DIAGNOSIS — Z00.00 ANNUAL PHYSICAL EXAM: ICD-10-CM

## 2019-03-26 DIAGNOSIS — E78.2 MIXED HYPERLIPIDEMIA: Chronic | ICD-10-CM

## 2019-03-26 DIAGNOSIS — R53.83 FATIGUE, UNSPECIFIED TYPE: Primary | ICD-10-CM

## 2019-03-26 DIAGNOSIS — R94.31 ELECTROCARDIOGRAM SHOWING T WAVE ABNORMALITIES: ICD-10-CM

## 2019-03-26 PROBLEM — M81.0 OSTEOPOROSIS: Status: ACTIVE | Noted: 2019-03-26

## 2019-03-26 PROBLEM — N95.9 MENOPAUSAL AND POSTMENOPAUSAL DISORDER: Status: ACTIVE | Noted: 2019-03-26

## 2019-03-26 LAB
ALBUMIN SERPL-MCNC: 4.37 G/DL (ref 3.2–4.8)
ALBUMIN/GLOB SERPL: 2 G/DL (ref 1.5–2.5)
ALP SERPL-CCNC: 68 U/L (ref 25–100)
ALT SERPL W P-5'-P-CCNC: 21 U/L (ref 7–40)
ANION GAP SERPL CALCULATED.3IONS-SCNC: 8 MMOL/L (ref 3–11)
AST SERPL-CCNC: 22 U/L (ref 0–33)
BASOPHILS # BLD AUTO: 0.01 10*3/MM3 (ref 0–0.2)
BASOPHILS NFR BLD AUTO: 0.2 % (ref 0–1)
BILIRUB SERPL-MCNC: 0.5 MG/DL (ref 0.3–1.2)
BUN BLD-MCNC: 20 MG/DL (ref 9–23)
BUN/CREAT SERPL: 28.2 (ref 7–25)
CALCIUM SPEC-SCNC: 9.5 MG/DL (ref 8.7–10.4)
CHLORIDE SERPL-SCNC: 104 MMOL/L (ref 99–109)
CHOLEST SERPL-MCNC: 174 MG/DL (ref 0–200)
CO2 SERPL-SCNC: 29 MMOL/L (ref 20–31)
CREAT BLD-MCNC: 0.71 MG/DL (ref 0.6–1.3)
DEPRECATED RDW RBC AUTO: 48.3 FL (ref 37–54)
EOSINOPHIL # BLD AUTO: 0.1 10*3/MM3 (ref 0–0.3)
EOSINOPHIL NFR BLD AUTO: 2.4 % (ref 0–3)
ERYTHROCYTE [DISTWIDTH] IN BLOOD BY AUTOMATED COUNT: 13.7 % (ref 11.3–14.5)
FOLATE SERPL-MCNC: 9.76 NG/ML (ref 3.2–20)
GFR SERPL CREATININE-BSD FRML MDRD: 80 ML/MIN/1.73
GLOBULIN UR ELPH-MCNC: 2.2 GM/DL
GLUCOSE BLD-MCNC: 91 MG/DL (ref 70–100)
HCT VFR BLD AUTO: 43.4 % (ref 34.5–44)
HDLC SERPL QL: 3.05
HDLC SERPL-MCNC: 57 MG/DL (ref 40–60)
HGB BLD-MCNC: 13.8 G/DL (ref 11.5–15.5)
IMM GRANULOCYTES # BLD AUTO: 0 10*3/MM3 (ref 0–0.05)
IMM GRANULOCYTES NFR BLD AUTO: 0 % (ref 0–0.6)
LDLC SERPL CALC-MCNC: 96 MG/DL (ref 0–100)
LYMPHOCYTES # BLD AUTO: 1.17 10*3/MM3 (ref 0.6–4.8)
LYMPHOCYTES NFR BLD AUTO: 28.4 % (ref 24–44)
MCH RBC QN AUTO: 30.6 PG (ref 27–31)
MCHC RBC AUTO-ENTMCNC: 31.8 G/DL (ref 32–36)
MCV RBC AUTO: 96.2 FL (ref 80–99)
MONOCYTES # BLD AUTO: 0.33 10*3/MM3 (ref 0–1)
MONOCYTES NFR BLD AUTO: 8 % (ref 0–12)
NEUTROPHILS # BLD AUTO: 2.51 10*3/MM3 (ref 1.5–8.3)
NEUTROPHILS NFR BLD AUTO: 61 % (ref 41–71)
PLATELET # BLD AUTO: 239 10*3/MM3 (ref 150–450)
PMV BLD AUTO: 10.5 FL (ref 6–12)
POTASSIUM BLD-SCNC: 4.3 MMOL/L (ref 3.5–5.5)
PROT SERPL-MCNC: 6.6 G/DL (ref 5.7–8.2)
RBC # BLD AUTO: 4.51 10*6/MM3 (ref 3.89–5.14)
SODIUM BLD-SCNC: 141 MMOL/L (ref 132–146)
TRIGL SERPL-MCNC: 105 MG/DL (ref 0–150)
TSH SERPL DL<=0.05 MIU/L-ACNC: 1.88 MIU/ML (ref 0.35–5.35)
VIT B12 BLD-MCNC: 873 PG/ML (ref 211–911)
VLDLC SERPL-MCNC: 21 MG/DL
WBC NRBC COR # BLD: 4.12 10*3/MM3 (ref 3.5–10.8)

## 2019-03-26 PROCEDURE — 80053 COMPREHEN METABOLIC PANEL: CPT | Performed by: NURSE PRACTITIONER

## 2019-03-26 PROCEDURE — 93000 ELECTROCARDIOGRAM COMPLETE: CPT | Performed by: NURSE PRACTITIONER

## 2019-03-26 PROCEDURE — 36415 COLL VENOUS BLD VENIPUNCTURE: CPT | Performed by: NURSE PRACTITIONER

## 2019-03-26 PROCEDURE — 82746 ASSAY OF FOLIC ACID SERUM: CPT | Performed by: NURSE PRACTITIONER

## 2019-03-26 PROCEDURE — 82652 VIT D 1 25-DIHYDROXY: CPT | Performed by: NURSE PRACTITIONER

## 2019-03-26 PROCEDURE — 82607 VITAMIN B-12: CPT | Performed by: NURSE PRACTITIONER

## 2019-03-26 PROCEDURE — 84443 ASSAY THYROID STIM HORMONE: CPT | Performed by: NURSE PRACTITIONER

## 2019-03-26 PROCEDURE — 80061 LIPID PANEL: CPT | Performed by: NURSE PRACTITIONER

## 2019-03-26 PROCEDURE — 99214 OFFICE O/P EST MOD 30 MIN: CPT | Performed by: NURSE PRACTITIONER

## 2019-03-26 PROCEDURE — 85025 COMPLETE CBC W/AUTO DIFF WBC: CPT | Performed by: NURSE PRACTITIONER

## 2019-03-26 NOTE — PROGRESS NOTES
Jacques Yeboah is a 74 y.o. female who presents today to establish care and hyperlipidemia.    Chief Complaint   Patient presents with   • Establish Care       Patient states that she has been very healthy. She follows a healthy diet and walks briskly for 20 minutes every day. She does have some pain in her lumbar spine L2-3, L1-2, S1-2 at times but takes Aleve and it provides relief. She has no complaints at this time.        The following portions of the patient's history were reviewed and updated as appropriate: allergies, current medications, past family history, past medical history, past social history, past surgical history and problem list.     Past Medical History:   Diagnosis Date   • Arthritis    • H/O bone density study    • Hip pain    • History of colonoscopy    • History of mammogram    • Impacted cerumen of right ear     Ears needs rinsed out, feels clogged two days.    • Inflammatory arthritis    • Lumbago    • Lumbar degenerative disc disease    • Metatarsalgia, left foot    • Osteoarthritis of knees, bilateral    • Pelvic fracture (CMS/HCC)    • Primary osteoarthritis of both hips    • Sciatica    • Thoracic spondylosis    • Wears glasses        Past Surgical History:   Procedure Laterality Date   • COLONOSCOPY     • EYE SURGERY     • FRACTURE SURGERY      left wrist; pelvis   • TONSILLECTOMY     • TOTAL HIP ARTHROPLASTY Right 10/3/2017    Procedure: TOTAL HIP ARTHROPLASTY RIGHT;  Surgeon: Prem Hodges MD;  Location: [facility];  Service:    • TUBAL ABDOMINAL LIGATION         Family History   Problem Relation Age of Onset   • Heart failure Mother         CHF   • Osteoarthritis Mother    • Heart attack Father         Acute MI   • Stroke Father         Stroke syndrome   • Ovarian cancer Neg Hx        Social History     Socioeconomic History   • Marital status:      Spouse name: Not on file   • Number of children: Not on file   • Years of education: Not on file   • Highest education  "level: Not on file   Tobacco Use   • Smoking status: Never Smoker   • Smokeless tobacco: Never Used   Substance and Sexual Activity   • Alcohol use: No   • Drug use: No   • Sexual activity: Defer       No Known Allergies      Current Outpatient Medications:   •  calcium carbonate (OS-SKYLER) 600 MG tablet, Take 600 mg by mouth 2 (Two) Times a Day With Meals., Disp: , Rfl:   •  simvastatin (ZOCOR) 20 MG tablet, Take 1 tablet by mouth Every Night., Disp: 90 tablet, Rfl: 2    Health Maintenance   Topic Date Due   • TDAP/TD VACCINES (1 - Tdap) 03/01/1963   • ZOSTER VACCINE (1 of 2) 03/01/1994   • MEDICARE ANNUAL WELLNESS  05/26/2016   • PNEUMOCOCCAL VACCINES (65+ LOW/MEDIUM RISK) (2 of 2 - PPSV23) 06/13/2018   • COLONOSCOPY  03/01/2021 (Originally 5/26/2016)   • LIPID PANEL  07/27/2019   • MAMMOGRAM  02/15/2020   • INFLUENZA VACCINE  Completed   • DXA SCAN  Discontinued        ROS    Review of Systems   HENT: Trouble swallowing: ekg.    Eyes: Positive for blurred vision.   Musculoskeletal: Positive for arthralgias and back pain.   All other systems reviewed and are negative.      Visit Vitals  /78   Pulse 68   Temp 96.6 °F (35.9 °C) (Temporal)   Resp 18   Ht 165 cm (64.96\")   Wt 69.9 kg (154 lb 3.2 oz)   SpO2 98%   BMI 25.69 kg/m²       Physical Exam     Physical Exam   Constitutional: She is oriented to person, place, and time. She appears well-developed and well-nourished.   HENT:   Head: Normocephalic and atraumatic.   Cardiovascular: Normal rate, regular rhythm and normal heart sounds. Exam reveals no gallop and no friction rub.   No murmur heard.  Pulmonary/Chest: Effort normal and breath sounds normal. No stridor. No respiratory distress. She has no wheezes. She has no rales.   Musculoskeletal:        Lumbar back: She exhibits decreased range of motion and pain.   Neurological: She is alert and oriented to person, place, and time.   Skin: Skin is warm and dry. Capillary refill takes less than 2 seconds. "   Psychiatric: She has a normal mood and affect. Her behavior is normal. Judgment and thought content normal.   Nursing note and vitals reviewed.      Assessment/Plan    Problem List Items Addressed This Visit        Cardiovascular and Mediastinum    Hyperlipidemia (Chronic)    Overview     Hx of.         Relevant Medications    simvastatin (ZOCOR) 20 MG tablet    Other Relevant Orders    Ambulatory Referral to Cardiology      Other Visit Diagnoses     Fatigue, unspecified type    -  Primary    Relevant Orders    CBC Auto Differential (Completed)    Comprehensive Metabolic Panel (Completed)    TSH (Completed)    Vitamin B12 & Folate (Completed)    Vitamin D 1,25 Dihydroxy    Lipid screening        Relevant Orders    Lipid Panel With / Chol / HDL Ratio (Completed)    Annual physical exam        Relevant Orders    ECG 12 Lead    Electrocardiogram showing T wave abnormalities        Relevant Orders    Ambulatory Referral to Cardiology          ECG 12 Lead  Date/Time: 3/26/2019 9:49 AM  Performed by: Sherice Juan APRN  Authorized by: Sherice Juan APRN   Comparison: compared with previous ECG from 2017  Similar to previous ECG  Comparison to previous EC17 - NSR with sinus arythmia - non-specific T wave abnormaily  Rhythm: sinus rhythm  Rate: normal  BPM: 71  Conduction: conduction normal  ST Segments: ST segments normal  ST Depression: V5 and V6  T inversion: III  T flattening: V3  QRS axis: normal  Other: no other findings  Other findings: non-specific ST-T wave changes and T wave abnormality    Clinical impression: non-specific ECG                XI Schaefer

## 2019-03-28 LAB — 1,25(OH)2D3 SERPL-MCNC: 54.1 PG/ML (ref 19.9–79.3)

## 2019-04-03 ENCOUNTER — OFFICE VISIT (OUTPATIENT)
Dept: ORTHOPEDIC SURGERY | Facility: CLINIC | Age: 74
End: 2019-04-03

## 2019-04-03 VITALS — HEIGHT: 65 IN | HEART RATE: 75 BPM | BODY MASS INDEX: 25.67 KG/M2 | OXYGEN SATURATION: 98 % | WEIGHT: 154.1 LBS

## 2019-04-03 DIAGNOSIS — G89.29 CHRONIC RIGHT SHOULDER PAIN: Primary | ICD-10-CM

## 2019-04-03 DIAGNOSIS — M25.511 CHRONIC RIGHT SHOULDER PAIN: Primary | ICD-10-CM

## 2019-04-03 PROCEDURE — 99213 OFFICE O/P EST LOW 20 MIN: CPT | Performed by: ORTHOPAEDIC SURGERY

## 2019-04-03 NOTE — PROGRESS NOTES
[facility] Note    Subjective     Chief Complaint   Patient presents with   • Right Shoulder - Pain        HPI    Jacques Yeboah is a 74 y.o. female.  She presents today for evaluation of right shoulder pain.  Shoulder has been bothering her for 2-3 months, following no particular injury.  The pain is 3 out of 10, dull in quality, and worse with overhead activities.  The pain is associated with grinding.  No history of trauma.      Patient Active Problem List   Diagnosis   • Hyperlipidemia   • Arthritis of right hip   • Status post total replacement of right hip   • Acute blood loss anemia, mild, asymptomatic   • Menopausal and postmenopausal disorder   • Osteoporosis     Past Medical History:   Diagnosis Date   • Arthritis    • H/O bone density study    • Hip pain    • History of colonoscopy    • History of mammogram    • Impacted cerumen of right ear     Ears needs rinsed out, feels clogged two days.    • Inflammatory arthritis    • Lumbago    • Lumbar degenerative disc disease    • Metatarsalgia, left foot    • Osteoarthritis of knees, bilateral    • Pelvic fracture (CMS/HCC)    • Primary osteoarthritis of both hips    • Sciatica    • Thoracic spondylosis    • Wears glasses       Past Surgical History:   Procedure Laterality Date   • COLONOSCOPY     • EYE SURGERY     • FRACTURE SURGERY      left wrist; pelvis   • TONSILLECTOMY     • TOTAL HIP ARTHROPLASTY Right 10/3/2017    Procedure: TOTAL HIP ARTHROPLASTY RIGHT;  Surgeon: Prem Hodges MD;  Location: [facility];  Service:    • TUBAL ABDOMINAL LIGATION        Family History   Problem Relation Age of Onset   • Heart failure Mother         CHF   • Osteoarthritis Mother    • Heart attack Father         Acute MI   • Stroke Father         Stroke syndrome   • Ovarian cancer Neg Hx      Social History     Socioeconomic History   • Marital status:      Spouse name: Not on file   • Number of children: Not on file   • Years of  education: Not on file   • Highest education level: Not on file   Tobacco Use   • Smoking status: Never Smoker   • Smokeless tobacco: Never Used   Substance and Sexual Activity   • Alcohol use: No   • Drug use: No   • Sexual activity: Defer      Current Outpatient Medications on File Prior to Visit   Medication Sig Dispense Refill   • simvastatin (ZOCOR) 20 MG tablet Take 1 tablet by mouth Every Night. 90 tablet 2   • [DISCONTINUED] calcium carbonate (OS-SKYLER) 600 MG tablet Take 600 mg by mouth 2 (Two) Times a Day With Meals.       No current facility-administered medications on file prior to visit.       No Known Allergies     Review of Systems   Constitutional: Negative for activity change, appetite change, chills, diaphoresis, fatigue, fever and unexpected weight change.   HENT: Negative for congestion, dental problem, drooling, ear discharge, ear pain, facial swelling, hearing loss, mouth sores, nosebleeds, postnasal drip, rhinorrhea, sinus pressure, sneezing, sore throat, tinnitus, trouble swallowing and voice change.    Eyes: Negative for photophobia, pain, discharge, redness, itching and visual disturbance.   Respiratory: Negative for apnea, cough, choking, chest tightness, shortness of breath, wheezing and stridor.    Cardiovascular: Negative for chest pain, palpitations and leg swelling.   Gastrointestinal: Negative for abdominal distention, abdominal pain, anal bleeding, blood in stool, constipation, diarrhea, nausea, rectal pain and vomiting.   Endocrine: Negative for cold intolerance, heat intolerance, polydipsia, polyphagia and polyuria.   Genitourinary: Negative for decreased urine volume, difficulty urinating, dysuria, enuresis, flank pain, frequency, genital sores, hematuria and urgency.   Musculoskeletal: Positive for arthralgias. Negative for back pain, gait problem, joint swelling, myalgias, neck pain and neck stiffness.   Skin: Negative for color change, pallor, rash and wound.  "  Allergic/Immunologic: Negative for environmental allergies, food allergies and immunocompromised state.   Neurological: Negative for dizziness, tremors, seizures, syncope, facial asymmetry, speech difficulty, weakness, light-headedness, numbness and headaches.   Hematological: Negative for adenopathy. Does not bruise/bleed easily.   Psychiatric/Behavioral: Negative for agitation, behavioral problems, confusion, decreased concentration, dysphoric mood, hallucinations, self-injury, sleep disturbance and suicidal ideas. The patient is not nervous/anxious and is not hyperactive.         Objective      Physical Exam  Pulse 75   Ht 165 cm (64.96\")   Wt 69.9 kg (154 lb 1.6 oz)   SpO2 98%   Breastfeeding? No   BMI 25.67 kg/m²     Body mass index is 25.67 kg/m².    General:   Mental Status:  Alert   Appearance: Cooperative, in no acute distress   Build and Nutrition: Well-nourished well-developed female   Orientation: Alert and oriented to person, place and time   Posture: Normal   Gait: Normal    Integument:   Right shoulder: No skin lesions, no rash, no ecchymosis    Neurologic:   Sensation:    Right hand: Intact to light touch in the digits   Motor:  Right upper extremity: 5/5 deltoid, biceps, triceps, wrist flexors, wrist extensors, and interossei    Vascular:   Right upper extremity: 2+ radial pulse, prompt capillary refill    Upper Extremities:   Right Shoulder:    Tenderness:  None    Swelling:  None    Crepitus: None    Atrophy:  None    Range of motion:  External rotation:  80°       Forward flexion:  160°       Abduction:   130°  Instability:  None  Deformities:  None  Functional testing: Negative drop arm, negative lift-off, positive impingement      Imaging/Studies  Imaging Results (last 24 hours)     Procedure Component Value Units Date/Time    XR Shoulder 2+ View Right [[id number]] Resulted:  04/03/19 1325     Updated:  04/03/19 1326    Narrative:       Right Shoulder Radiographs  Indication: right " shoulder pain  Views: AP, outlet and axillary views of the right shoulder    Comparison: no prior studies available for review    Findings:  Inferior osteophyte off the humeral head, with no acute bony   abnormalities, with good alignment.              Assessment and Plan     Jacques was seen today for pain.    Diagnoses and all orders for this visit:    Chronic right shoulder pain  -     XR Shoulder 2+ View Right  -     Ambulatory Referral to Physical Therapy Evaluate and treat        1. Chronic right shoulder pain        I reviewed my findings with the patient today.  I suspect that she has some rotator cuff tendinitis and bursitis, in addition to some mild arthritis in the shoulder.  We will try a course of physical therapy, and a referral was provided.  I will see her back in 6 weeks, but sooner for any problems.  Further imaging may be considered if appropriate in the future.    Return in about 6 weeks (around 5/15/2019).      Medical Decision Making  Management Options : physical/occupational therapy  Data/Risk: radiology tests and independent visualization of imaging, lab tests, or EMG/NCV      Prem Hodges MD  04/03/19  1:42 PM

## 2019-04-04 ENCOUNTER — HOSPITAL ENCOUNTER (OUTPATIENT)
Dept: PHYSICAL THERAPY | Facility: HOSPITAL | Age: 74
Setting detail: THERAPIES SERIES
Discharge: HOME | End: 2019-04-04

## 2019-04-04 DIAGNOSIS — M25.511 CHRONIC RIGHT SHOULDER PAIN: Primary | ICD-10-CM

## 2019-04-04 DIAGNOSIS — G89.29 CHRONIC RIGHT SHOULDER PAIN: Primary | ICD-10-CM

## 2019-04-04 PROCEDURE — 97161 PT EVAL LOW COMPLEX 20 MIN: CPT | Performed by: PHYSICAL THERAPIST

## 2019-04-04 NOTE — THERAPY EVALUATION
Outpatient Physical Therapy Ortho Initial Evaluation  [facility]     Patient Name: Jacques Yeboah  : 1945  MRN: [id number]  Today's Date: 2019      Visit Date: 2019    Patient Active Problem List   Diagnosis   • Hyperlipidemia   • Arthritis of right hip   • Status post total replacement of right hip   • Acute blood loss anemia, mild, asymptomatic   • Menopausal and postmenopausal disorder   • Osteoporosis        Past Medical History:   Diagnosis Date   • Arthritis    • H/O bone density study    • Hip pain    • History of colonoscopy    • History of mammogram    • Impacted cerumen of right ear     Ears needs rinsed out, feels clogged two days.    • Inflammatory arthritis    • Lumbago    • Lumbar degenerative disc disease    • Metatarsalgia, left foot    • Osteoarthritis of knees, bilateral    • Pelvic fracture (CMS/HCC)    • Primary osteoarthritis of both hips    • Sciatica    • Thoracic spondylosis    • Wears glasses         Past Surgical History:   Procedure Laterality Date   • COLONOSCOPY     • EYE SURGERY     • FRACTURE SURGERY      left wrist; pelvis   • TONSILLECTOMY     • TOTAL HIP ARTHROPLASTY Right 10/3/2017    Procedure: TOTAL HIP ARTHROPLASTY RIGHT;  Surgeon: Prem Hodges MD;  Location: [facility];  Service:    • TUBAL ABDOMINAL LIGATION         Visit Dx:     ICD-10-CM ICD-9-CM   1. Chronic right shoulder pain M25.511 719.41    G89.29 338.29         Patient History     Row Name 19 1300             History    Chief Complaint  Pain;Joint stiffness  -      Type of Pain  Shoulder pain right  -      Date Current Problem(s) Began  -- About 2-3 months ago, gradual onset.  -EMILY      Brief Description of Current Complaint  Pt. describes gradual loss of R shoulder motion with intermittent pain.  She denies any injury to her arm.  -EMILY      Patient/Caregiver Goals  Relieve pain;Improve mobility;Return to prior level of function  -EMILY      Hand Dominance  right-handed  -EMILY       Occupation/sports/leisure activities  Retired. Enjoys reading and walking.  -EMILY      What clinical tests have you had for this problem?  X-ray  -EMILY      Results of Clinical Tests  mild degenerative changes  -EMILY         Pain     Pain Location  Shoulder  -EMILY      Pain at Present  0  -EMILY      Pain at Best  0  -EMILY      Pain at Worst  5  -EMILY      Pain Frequency  Intermittent  -EMILY      Pain Description  Dull;Aching  -EMILY      What Performance Factors Make the Current Problem(s) WORSE?  laying on the right side, reaching behind the back, reaching overhead  -EMILY      Is your sleep disturbed?  Yes if laying on right side  -EMILY         Fall Risk Assessment    Any falls in the past year:  Yes  -EMILY      Number of falls reported in the last 12 months  1  -EMILY      Factors that contributed to the fall:  Slippery surface  -EMILY         Daily Activities    Primary Language  English  -EMILY      Are you able to read  Yes  -EMILY      Are you able to write  Yes  -EMILY      Teaching needs identified  Home Exercise Program;Management of Condition  -EMILY      Patient is concerned about/has problems with  Difficulty with self care (i.e. bathing, dressing, toileting:;Flexibility;Performing home management (household chores, shopping, care of dependents);Reaching over head;Repetitive movements of the hand, arm, shoulder  -EMILY      Does patient have problems with the following?  None  -EMILY      Pt Participated in POC and Goals  Yes  -EMILY         Safety    Are you being hurt, hit, or frightened by anyone at home or in your life?  No  -EMILY      Are you being neglected by a caregiver  No  -EMILY        User Key  (r) = Recorded By, (t) = Taken By, (c) = Cosigned By    Initials Name Provider Type    EMILY Alma Vance, PT Physical Therapist          PT Ortho     Row Name 04/04/19 1700       Special Tests/Palpation    Special Tests/Palpation  -- Denies TTP R shoulder.  -EMILY       Shoulder Girdle Accessory Motions    Posterior glide of humerus  Right:;Hypomobile  -EMILY     Inferior glide of humerus  Right:;Hypomobile  -EMILY       Shoulder Impingement/Rotator Cuff Special Tests    Neer Impingement Test (RC Lesion vs. Bursitis)  Right:;Positive  -EMILY    Full Can Test (RC Lesion)  Right:;Positive  -EMILY       Right Upper Ext    Rt Shoulder Abduction AROM  108  -EMILY    Rt Shoulder Flexion AROM  120  -EMILY    Rt Shoulder External Rotation AROM  81  -EMILY    Rt Shoulder Internal Rotation AROM  35  -EMILY       Left Upper Ext    Lt Shoulder Abduction AROM  145  -EMILY    Lt Shoulder Flexion AROM  134  -EMILY    Lt Shoulder External Rotation AROM  80  -EMILY    Lt Shoulder Internal Rotation AROM  71  -EMILY       MMT Right Upper Ext    Rt Shoulder Flexion MMT, Gross Movement  (4+/5) good plus  -EMILY    Rt Shoulder ABduction MMT, Gross Movement  (4/5) good  -EMILY    Rt Shoulder Internal Rotation MMT, Gross Movement  (5/5) normal  -EMILY    Rt Shoulder External Rotation MMT, Gross Movement  (5/5) normal  -EMILY       MMT Left Upper Ext    Lt Shoulder Flexion MMT, Gross Movement  (5/5) normal  -EMILY    Lt Shoulder ABduction MMT, Gross Movement  (5/5) normal  -EMILY    Lt Shoulder Internal Rotation MMT, Gross Movement  (5/5) normal  -EMILY    Lt Shoulder External Rotation MMT, Gross Movement  (5/5) normal  -EMILY       Sensation    Sensation WNL?  WNL  -EMILY      User Key  (r) = Recorded By, (t) = Taken By, (c) = Cosigned By    Initials Name Provider Type    Alma White, PT Physical Therapist                  [unfilled]    Therapy Education  Given: HEP  Program: New  How Provided: Verbal, Demonstration, Written  Provided to: Patient  Level of Understanding: Verbalized, Demonstrated     PT OP Goals     Row Name 04/04/19 1700          PT Short Term Goals    STG Date to Achieve  05/02/19  -EMILY     STG 1  Pt. demonstrates independence in effective HEP.  -EMILY     STG 2  Pt. reports reduction in R shoulder pain with movement to 3/10.  -EMILY     STG 3  R shoudler AROM is improved to 120 degrees abduction and 45 degrees IR.  -EMILY     STG  4  Quick DASH score is improved by 10 points.  -EMILY        Long Term Goals    LTG Date to Achieve  05/16/19  -EMILY     LTG 1  Pt. is independent in advanced HEP and self management for ongoing improvement.  -EMILY     LTG 2  R shoulder pain is occasional and no worse than 3/10.  -EMILY     LTG 3  R shoulder AROM and strength are sufficient for performance of desired activities.  -EMILY        Time Calculation    PT Goal Re-Cert Due Date  07/03/19  -EMILY       User Key  (r) = Recorded By, (t) = Taken By, (c) = Cosigned By    Initials Name Provider Type    Alma White PT Physical Therapist          PT Assessment/Plan     Row Name 04/04/19 9199          PT Assessment    Functional Limitations  Limitations in functional capacity and performance;Performance in self-care ADL;Limitation in home management  -EMILY     Impairments  Impaired flexibility;Muscle strength;Pain;Joint mobility;Range of motion;Posture  -EMILY     Assessment Comments  Pt.  presents with loss of R shoulder motion affecting functional abilities.  Clinical presentation consistent with mild RC strain related to impingement exacerbated by tight posterior capsule, anterior humeral head translation.   -EMILY     Please refer to paper survey for additional self-reported information  Yes  -EMILY     Rehab Potential  Good  -EMILY     Patient/caregiver participated in establishment of treatment plan and goals  Yes  -EMILY     Patient would benefit from skilled therapy intervention  Yes  -EMILY        PT Plan    PT Frequency  2x/week  -EMILY     Predicted Duration of Therapy Intervention (Therapy Eval)  12 visits  -EMILY     Planned CPT's?  PT EVAL LOW COMPLEXITY: 08345;PT MANUAL THERAPY EA 15 MIN: 19002;PT HOT OR COLD PACK TREAT MCARE;PT NEUROMUSC RE-EDUCATION EA 15 MIN: 40889;PT THER PROC EA 15 MIN: 88300  -     PT Plan Comments  PT per POC.  -EMILY       User Key  (r) = Recorded By, (t) = Taken By, (c) = Cosigned By    Initials Name Provider Type    Alma White PT Physical  Therapist                              Outcome Measure Options: Quick DASH  Quick DASH  Open a tight or new jar.: Severe Difficulty  Do heavy household chores (e.g., wash walls, wash floors): Unable  Carry a shopping bag or briefcase: Severe Difficulty  Wash your back: Severe Difficulty  Use a knife to cut food: No Difficulty  Recreational activities in which you take some force or impact through your arm, should or hand (e.g. golf, hammering, tennis, etc.): Severe Difficulty  During the past week, to what extent has your arm, shoulder, or hand problem interfered with your normal social activites with family, friends, neighbors or groups?: Moderately  During the past week, were you limited in your work or other regular daily activities as a result of your arm, shoulder or hand problem?: Slightly Limited  Arm, Shoulder, or hand pain: Mild  Tingling (pins and needles) in your arm, shoulder, or hand: None  During the past week, how much difficulty have you had sleeping because of the pain in your arm, shoulder or hand?: Mild Difficulty  Number of Questions Answered: 11  Quick DASH Score: 47.73         Time Calculation:     Start Time: 1345     Therapy Charges for Today     Code Description Service Date Service Provider Modifiers Qty    78015624525 HC PT EVAL LOW COMPLEXITY 3 4/4/2019 Alma Vance, PT GP 1          PT G-Codes  Outcome Measure Options: Quick DASH  Quick DASH Score: 47.73         Alma Vance, PT  4/4/2019

## 2019-04-09 RX ORDER — SIMVASTATIN 20 MG
20 TABLET ORAL NIGHTLY
Qty: 90 TABLET | Refills: 2 | Status: SHIPPED | OUTPATIENT
Start: 2019-04-09 | End: 2020-01-24 | Stop reason: SDUPTHER

## 2019-04-16 ENCOUNTER — HOSPITAL ENCOUNTER (OUTPATIENT)
Dept: PHYSICAL THERAPY | Facility: HOSPITAL | Age: 74
Setting detail: THERAPIES SERIES
Discharge: HOME | End: 2019-04-16

## 2019-04-16 DIAGNOSIS — G89.29 CHRONIC RIGHT SHOULDER PAIN: Primary | ICD-10-CM

## 2019-04-16 DIAGNOSIS — M25.511 CHRONIC RIGHT SHOULDER PAIN: Primary | ICD-10-CM

## 2019-04-16 PROCEDURE — 97140 MANUAL THERAPY 1/> REGIONS: CPT | Performed by: PHYSICAL THERAPIST

## 2019-04-16 PROCEDURE — 97110 THERAPEUTIC EXERCISES: CPT | Performed by: PHYSICAL THERAPIST

## 2019-04-16 NOTE — THERAPY TREATMENT NOTE
Outpatient Physical Therapy Ortho Treatment Note   [place]     Patient Name: Jacques Yeboah  : 1945  MRN: [id number]  Today's Date: 2019      Visit Date: 2019    Visit Dx:    ICD-10-CM ICD-9-CM   1. Chronic right shoulder pain M25.511 719.41    G89.29 338.29       Patient Active Problem List   Diagnosis   • Hyperlipidemia   • Arthritis of right hip   • Status post total replacement of right hip   • Acute blood loss anemia, mild, asymptomatic   • Menopausal and postmenopausal disorder   • Osteoporosis        Past Medical History:   Diagnosis Date   • Arthritis    • H/O bone density study    • Hip pain    • History of colonoscopy    • History of mammogram    • Impacted cerumen of right ear     Ears needs rinsed out, feels clogged two days.    • Inflammatory arthritis    • Lumbago    • Lumbar degenerative disc disease    • Metatarsalgia, left foot    • Osteoarthritis of knees, bilateral    • Pelvic fracture (CMS/HCC)    • Primary osteoarthritis of both hips    • Sciatica    • Thoracic spondylosis    • Wears glasses         Past Surgical History:   Procedure Laterality Date   • COLONOSCOPY     • EYE SURGERY     • FRACTURE SURGERY      left wrist; pelvis   • TONSILLECTOMY     • TOTAL HIP ARTHROPLASTY Right 10/3/2017    Procedure: TOTAL HIP ARTHROPLASTY RIGHT;  Surgeon: Prem Hodges MD;  Location: [facility];  Service:    • TUBAL ABDOMINAL LIGATION         PT Ortho     Row Name 19 1300       Subjective Comments    Subjective Comments  Pt. notices improvement.  She has no pain at rest, only if she gets her arm in an awkward position.  HEP seems helpful.  It is hard for her to reach behind her back.  -EMILY       Subjective Pain    Able to rate subjective pain?  yes  -EMILY    Pre-Treatment Pain Level  0  -EMILY    Post-Treatment Pain Level  0  -EMILY      User Key  (r) = Recorded By, (t) = Taken By, (c) = Cosigned By    Initials Name Provider Type    Alma White, PT Physical Therapist                       PT Assessment/Plan     Row Name 04/16/19 1300          PT Assessment    Assessment Comments  Pt. notices improvement with exception of reaching behind back.  She domonstrated correct performance of new HEP to address IR limitation.  -EMILY        PT Plan    PT Plan Comments  Continue and progress as tolerated.  Pt. hopes to transition to independent exercise as soon as possible.  -       User Key  (r) = Recorded By, (t) = Taken By, (c) = Cosigned By    Initials Name Provider Type    Alma White, MICHEAL Physical Therapist            Exercises     Row Name 04/16/19 1300             Subjective Comments    Subjective Comments  Pt. notices improvement.  She has no pain at rest, only if she gets her arm in an awkward position.  HEP seems helpful.  It is hard for her to reach behind her back.  -EMILY         Subjective Pain    Able to rate subjective pain?  yes  -EMILY      Pre-Treatment Pain Level  0  -EMILY      Post-Treatment Pain Level  0  -EMILY         Total Minutes    64277 - PT Therapeutic Exercise Minutes  30  -EMILY      37212 - PT Manual Therapy Minutes  10  -EMILY         Exercise 1    Exercise Name 1  Reviewed HEP.  Warmed up on UBE prior to manual techniques.  Practiced wand ex. and towel stretch behind back.  Added these to HEP.  -EMILY        User Key  (r) = Recorded By, (t) = Taken By, (c) = Cosigned By    Initials Name Provider Type    Alma White, PT Physical Therapist                      Manual Rx (last 36 hours)      Manual Treatments     Row Name 04/16/19 1300             Total Minutes    69083 - PT Manual Therapy Minutes  10  -EMILY         Manual Rx 1    Manual Rx 1 Location  R shoulder  -EMILY      Manual Rx 1 Type  GH distraction, inferior and posterior GH glide and MWM.  Passive stretch into ER and IR.  -EMILY        User Key  (r) = Recorded By, (t) = Taken By, (c) = Cosigned By    Initials Name Provider Type    Alma White, PT Physical Therapist                             Time  Calculation:   Start Time: 1030  Total Timed Code Minutes- PT: 40 minute(s)  Therapy Charges for Today     Code Description Service Date Service Provider Modifiers Qty    92598705525  PT THER PROC EA 15 MIN 4/16/2019 Alma Vance, PT GP 2    [id number]  PT MANUAL THERAPY EA 15 MIN 4/16/2019 Alma Vance, PT GP 1                    Alma Vance, PT  4/16/2019

## 2019-04-17 ENCOUNTER — HOSPITAL ENCOUNTER (OUTPATIENT)
Dept: MAMMOGRAPHY | Facility: HOSPITAL | Age: 74
Discharge: HOME | End: 2019-04-17
Attending: OBSTETRICS & GYNECOLOGY | Admitting: OBSTETRICS & GYNECOLOGY

## 2019-04-17 DIAGNOSIS — Z12.31 VISIT FOR SCREENING MAMMOGRAM: ICD-10-CM

## 2019-04-17 PROCEDURE — 77067 SCR MAMMO BI INCL CAD: CPT | Performed by: RADIOLOGY

## 2019-04-17 PROCEDURE — 77063 BREAST TOMOSYNTHESIS BI: CPT | Performed by: RADIOLOGY

## 2019-04-17 PROCEDURE — 77067 SCR MAMMO BI INCL CAD: CPT

## 2019-04-17 PROCEDURE — 77063 BREAST TOMOSYNTHESIS BI: CPT

## 2019-04-23 ENCOUNTER — HOSPITAL ENCOUNTER (OUTPATIENT)
Dept: PHYSICAL THERAPY | Facility: HOSPITAL | Age: 74
Setting detail: THERAPIES SERIES
Discharge: HOME | End: 2019-04-23

## 2019-04-23 DIAGNOSIS — G89.29 CHRONIC RIGHT SHOULDER PAIN: Primary | ICD-10-CM

## 2019-04-23 DIAGNOSIS — M25.511 CHRONIC RIGHT SHOULDER PAIN: Primary | ICD-10-CM

## 2019-04-23 PROCEDURE — 97140 MANUAL THERAPY 1/> REGIONS: CPT | Performed by: PHYSICAL THERAPIST

## 2019-04-23 PROCEDURE — 97110 THERAPEUTIC EXERCISES: CPT | Performed by: PHYSICAL THERAPIST

## 2019-04-23 NOTE — THERAPY TREATMENT NOTE
Outpatient Physical Therapy Ortho Treatment Note   [place]     Patient Name: Jacques Yeboah  : 1945  MRN: [id number]  Today's Date: 2019      Visit Date: 2019    Visit Dx:    ICD-10-CM ICD-9-CM   1. Chronic right shoulder pain M25.511 719.41    G89.29 338.29       Patient Active Problem List   Diagnosis   • Hyperlipidemia   • Arthritis of right hip   • Status post total replacement of right hip   • Acute blood loss anemia, mild, asymptomatic   • Menopausal and postmenopausal disorder   • Osteoporosis        Past Medical History:   Diagnosis Date   • Arthritis    • H/O bone density study    • Hip pain    • History of colonoscopy    • History of mammogram    • Impacted cerumen of right ear     Ears needs rinsed out, feels clogged two days.    • Inflammatory arthritis    • Lumbago    • Lumbar degenerative disc disease    • Metatarsalgia, left foot    • Osteoarthritis of knees, bilateral    • Pelvic fracture (CMS/HCC)    • Primary osteoarthritis of both hips    • Sciatica    • Thoracic spondylosis    • Wears glasses         Past Surgical History:   Procedure Laterality Date   • COLONOSCOPY     • EYE SURGERY     • FRACTURE SURGERY      left wrist; pelvis   • TONSILLECTOMY     • TOTAL HIP ARTHROPLASTY Right 10/3/2017    Procedure: TOTAL HIP ARTHROPLASTY RIGHT;  Surgeon: Prem Hodges MD;  Location: [facility];  Service:    • TUBAL ABDOMINAL LIGATION         PT Ortho     Row Name 19 1100       Subjective Comments    Subjective Comments  Pt. notices improving ability to reach behind her back.  She feels that her exercises are helpful.  She wants to know what else she can do at home.  -EMILY       Subjective Pain    Able to rate subjective pain?  yes  -EMILY    Pre-Treatment Pain Level  0  -EMILY    Post-Treatment Pain Level  0  -EMILY      User Key  (r) = Recorded By, (t) = Taken By, (c) = Cosigned By    Initials Name Provider Type    Alma White, PT Physical Therapist                       PT Assessment/Plan     Row Name 04/23/19 1100          PT Assessment    Assessment Comments  Pt. has tendency to elevate and protract R scapula.  Her shoulder motion is visibly improved and nearly equal to uninvolved shoulder when grossly assessed.  -EMILY        PT Plan    PT Plan Comments  Pt. is unavailable next week.  Re-check in 2 weeks.  -EMILY       User Key  (r) = Recorded By, (t) = Taken By, (c) = Cosigned By    Initials Name Provider Type    Alma White PT Physical Therapist            Exercises     Row Name 04/23/19 1100             Subjective Comments    Subjective Comments  Pt. notices improving ability to reach behind her back.  She feels that her exercises are helpful.  She wants to know what else she can do at home.  -EMILY         Subjective Pain    Able to rate subjective pain?  yes  -EMILY      Pre-Treatment Pain Level  0  -EMILY      Post-Treatment Pain Level  0  -EMILY         Total Minutes    31982 - PT Therapeutic Exercise Minutes  30  -EMILY      56328 - PT Manual Therapy Minutes  10  -EMILY         Exercise 1    Exercise Name 1  Reinforced existing HEP, emphasizing use of correct form without compensation.  Practiced additional exercises in iic today and added these to HEP, including door pectoral stretch, scapular retraction/depression, and resisted ER/scap retraction with ER.  -EMILY        User Key  (r) = Recorded By, (t) = Taken By, (c) = Cosigned By    Initials Name Provider Type    Alma White PT Physical Therapist                      Manual Rx (last 36 hours)      Manual Treatments     Row Name 04/23/19 1100             Total Minutes    38616 - PT Manual Therapy Minutes  10  -EMILY         Manual Rx 1    Manual Rx 1 Location  R shoulder  -EMILY      Manual Rx 1 Type  GH distraction, inferior and posterior GH glide and MWM.  Passive scapular retraction and depression.  -EMILY        User Key  (r) = Recorded By, (t) = Taken By, (c) = Cosigned By    Initials Name Provider Type     Alma White, PT Physical Therapist                             Time Calculation:   Start Time: 1030  Total Timed Code Minutes- PT: 40 minute(s)  Therapy Charges for Today     Code Description Service Date Service Provider Modifiers Qty    [id number]  PT THER PROC EA 15 MIN 4/23/2019 Alma Vance, PT GP 2    [id number] HC PT MANUAL THERAPY EA 15 MIN 4/23/2019 Alma Vance, PT GP 1                    Alma Vance, PT  4/23/2019

## 2019-05-07 ENCOUNTER — HOSPITAL ENCOUNTER (OUTPATIENT)
Dept: PHYSICAL THERAPY | Facility: HOSPITAL | Age: 74
Setting detail: THERAPIES SERIES
Discharge: HOME | End: 2019-05-07

## 2019-05-07 DIAGNOSIS — M25.511 CHRONIC RIGHT SHOULDER PAIN: Primary | ICD-10-CM

## 2019-05-07 DIAGNOSIS — G89.29 CHRONIC RIGHT SHOULDER PAIN: Primary | ICD-10-CM

## 2019-05-07 PROCEDURE — 97110 THERAPEUTIC EXERCISES: CPT | Performed by: PHYSICAL THERAPIST

## 2019-05-07 NOTE — THERAPY DISCHARGE NOTE
Outpatient Physical Therapy Ortho Progress Note/Discharge Summary   [place]     Patient Name: Jacques Yeboah  : 1945  MRN: [id number]  Today's Date: 2019      Visit Date: 2019    Visit Dx:    ICD-10-CM ICD-9-CM   1. Chronic right shoulder pain M25.511 719.41    G89.29 338.29       Patient Active Problem List   Diagnosis   • Hyperlipidemia   • Arthritis of right hip   • Status post total replacement of right hip   • Acute blood loss anemia, mild, asymptomatic   • Menopausal and postmenopausal disorder   • Osteoporosis        Past Medical History:   Diagnosis Date   • Arthritis    • H/O bone density study    • Hip pain    • History of colonoscopy    • History of mammogram    • Impacted cerumen of right ear     Ears needs rinsed out, feels clogged two days.    • Inflammatory arthritis    • Lumbago    • Lumbar degenerative disc disease    • Metatarsalgia, left foot    • Osteoarthritis of knees, bilateral    • Pelvic fracture (CMS/HCC)    • Primary osteoarthritis of both hips    • Sciatica    • Thoracic spondylosis    • Wears glasses         Past Surgical History:   Procedure Laterality Date   • COLONOSCOPY     • EYE SURGERY     • FRACTURE SURGERY      left wrist; pelvis   • TONSILLECTOMY     • TOTAL HIP ARTHROPLASTY Right 10/3/2017    Procedure: TOTAL HIP ARTHROPLASTY RIGHT;  Surgeon: Prem Hodges MD;  Location: [facility];  Service:    • TUBAL ABDOMINAL LIGATION         PT Ortho     Row Name 19 1500       Subjective Comments    Subjective Comments  Pt. reports occasional mild discomfort, but also notices improvement in mobility and functional abilities.  -EMILY       Subjective Pain    Able to rate subjective pain?  yes  -EMILY    Pre-Treatment Pain Level  0  -EMILY    Post-Treatment Pain Level  0  -EMILY       Right Upper Ext    Rt Shoulder Abduction AROM  118  -EMILY    Rt Shoulder Flexion AROM  130  -EMILY    Rt Shoulder Internal Rotation AROM  75  -EMILY       MMT Right Upper Ext    Rt Shoulder  Flexion MMT, Gross Movement  (4+/5) good plus  -EMILY    Rt Shoulder ABduction MMT, Gross Movement  (4+/5) good plus  -EMILY      User Key  (r) = Recorded By, (t) = Taken By, (c) = Cosigned By    Initials Name Provider Type    Alma White, MICHEAL Physical Therapist                      PT Assessment/Plan     Row Name 05/07/19 1500          PT Assessment    Assessment Comments  Pt. has achieved goals and is able to use her right arm to her satisfaction with only occasional, mild discomfort.  -EMILY        PT Plan    PT Plan Comments  DC PT.  -EMILY       User Key  (r) = Recorded By, (t) = Taken By, (c) = Cosigned By    Initials Name Provider Type    Alma White PT Physical Therapist              Exercises     Row Name 05/07/19 1500             Subjective Comments    Subjective Comments  Pt. reports occasional mild discomfort, but also notices improvement in mobility and functional abilities.  -EMILY         Subjective Pain    Able to rate subjective pain?  yes  -EMILY      Pre-Treatment Pain Level  0  -EMILY      Post-Treatment Pain Level  0  -EMILY         Total Minutes    05987 - PT Therapeutic Exercise Minutes  25  -EMILY         Exercise 1    Exercise Name 1  Reassessment completed today.  Advised pt. to continue with HEP.  -EMILY        User Key  (r) = Recorded By, (t) = Taken By, (c) = Cosigned By    Initials Name Provider Type    Alma White PT Physical Therapist                         PT OP Goals     Row Name 05/07/19 1500          PT Short Term Goals    STG Date to Achieve  05/02/19  -EMILY     STG 1  Pt. demonstrates independence in effective HEP.  -EMILY     STG 1 Progress  Met  -EMILY     STG 2  Pt. reports reduction in R shoulder pain with movement to 3/10.  -EMILY     STG 2 Progress  Met  -EMILY     STG 3  R shoudler AROM is improved to 120 degrees abduction and 45 degrees IR.  -EMILY     STG 3 Progress  Met  -EMILY     STG 4  Quick DASH score is improved by 10 points.  -     STG 4 Progress  Met  -EMILY        Long Term Goals     [facility]  [facility]  Pt. is independent in advanced HEP and self management for ongoing improvement.  -     LTG 1 Progress  Met  -     LTG 2  R shoulder pain is occasional and no worse than 3/10.  -     LTG 2 Progress  Met  -     LTG 3  R shoulder AROM and strength are sufficient for performance of desired activities.  -     LTG 3 Progress  Met  -       User Key  (r) = Recorded By, (t) = Taken By, (c) = Cosigned By    Initials Name Provider Type    Alma White, PT Physical Therapist               Outcome Measure Options: Quick DASH  Quick DASH  Open a tight or new jar.: No Difficulty  Do heavy household chores (e.g., wash walls, wash floors): Unable  Carry a shopping bag or briefcase: Moderate Difficulty  Wash your back: Mild Difficulty  Use a knife to cut food: No Difficulty  Recreational activities in which you take some force or impact through your arm, should or hand (e.g. golf, hammering, tennis, etc.): Moderate Difficulty  During the past week, to what extent has your arm, shoulder, or hand problem interfered with your normal social activites with family, friends, neighbors or groups?: Slightly  During the past week, were you limited in your work or other regular daily activities as a result of your arm, shoulder or hand problem?: Not limited at all  Arm, Shoulder, or hand pain: Mild  Tingling (pins and needles) in your arm, shoulder, or hand: None  During the past week, how much difficulty have you had sleeping because of the pain in your arm, shoulder or hand?: No difficulty  Number of Questions Answered: 11  Quick DASH Score: 25         Time Calculation:   Start Time: 1515  Total Timed Code Minutes- PT: 25 minute(s)  Therapy Charges for Today     Code Description Service Date Service Provider Modifiers Qty    [id number] HC PT THER PROC EA 15 MIN 5/7/2019 Alma Vance, PT GP 2          PT G-Codes  Outcome Measure Options: Quick DASH  Quick DASH Score:  25     OP PT Discharge Summary  Date of Discharge: 05/07/19  Reason for Discharge: All goals achieved  Outcomes Achieved: Able to achieve all goals within established timeline  Discharge Destination: Home with home program      Alma Vance, PT  5/7/2019

## 2019-08-09 ENCOUNTER — APPOINTMENT (OUTPATIENT)
Dept: CT IMAGING | Facility: HOSPITAL | Age: 74
End: 2019-08-09

## 2019-08-09 ENCOUNTER — HOSPITAL ENCOUNTER (EMERGENCY)
Facility: HOSPITAL | Age: 74
Discharge: HOME | End: 2019-08-09
Attending: EMERGENCY MEDICINE | Admitting: EMERGENCY MEDICINE

## 2019-08-09 VITALS
DIASTOLIC BLOOD PRESSURE: 63 MMHG | HEART RATE: 65 BPM | BODY MASS INDEX: 24.99 KG/M2 | HEIGHT: 65 IN | RESPIRATION RATE: 18 BRPM | WEIGHT: 150 LBS | TEMPERATURE: 98 F | OXYGEN SATURATION: 99 % | SYSTOLIC BLOOD PRESSURE: 131 MMHG

## 2019-08-09 DIAGNOSIS — K52.9 COLITIS: Primary | ICD-10-CM

## 2019-08-09 DIAGNOSIS — R19.7 ACUTE DIARRHEA: ICD-10-CM

## 2019-08-09 LAB
ALBUMIN SERPL-MCNC: 4 G/DL (ref 3.5–5.2)
ALBUMIN/GLOB SERPL: 1.3 G/DL
ALP SERPL-CCNC: 56 U/L (ref 39–117)
ALT SERPL W P-5'-P-CCNC: 16 U/L (ref 1–33)
ANION GAP SERPL CALCULATED.3IONS-SCNC: 10 MMOL/L (ref 5–15)
AST SERPL-CCNC: 21 U/L (ref 1–32)
BACTERIA UR QL AUTO: ABNORMAL /HPF
BASOPHILS # BLD AUTO: 0.02 10*3/MM3 (ref 0–0.2)
BASOPHILS NFR BLD AUTO: 0.5 % (ref 0–1.5)
BILIRUB SERPL-MCNC: 0.6 MG/DL (ref 0.2–1.2)
BILIRUB UR QL STRIP: NEGATIVE
BUN BLD-MCNC: 25 MG/DL (ref 8–23)
BUN/CREAT SERPL: 31.3 (ref 7–25)
CALCIUM SPEC-SCNC: 9.4 MG/DL (ref 8.6–10.5)
CHLORIDE SERPL-SCNC: 101 MMOL/L (ref 98–107)
CLARITY UR: CLEAR
CO2 SERPL-SCNC: 27 MMOL/L (ref 22–29)
COLOR UR: YELLOW
CREAT BLD-MCNC: 0.8 MG/DL (ref 0.57–1)
DEPRECATED RDW RBC AUTO: 46.4 FL (ref 37–54)
EOSINOPHIL # BLD AUTO: 0.1 10*3/MM3 (ref 0–0.4)
EOSINOPHIL NFR BLD AUTO: 2.4 % (ref 0.3–6.2)
ERYTHROCYTE [DISTWIDTH] IN BLOOD BY AUTOMATED COUNT: 13.1 % (ref 12.3–15.4)
GFR SERPL CREATININE-BSD FRML MDRD: 70 ML/MIN/1.73
GLOBULIN UR ELPH-MCNC: 3.1 GM/DL
GLUCOSE BLD-MCNC: 107 MG/DL (ref 65–99)
GLUCOSE UR STRIP-MCNC: NEGATIVE MG/DL
HCT VFR BLD AUTO: 40.3 % (ref 34–46.6)
HGB BLD-MCNC: 12.7 G/DL (ref 12–15.9)
HGB UR QL STRIP.AUTO: NEGATIVE
HOLD SPECIMEN: NORMAL
HOLD SPECIMEN: NORMAL
HYALINE CASTS UR QL AUTO: ABNORMAL /LPF
IMM GRANULOCYTES # BLD AUTO: 0.01 10*3/MM3 (ref 0–0.05)
IMM GRANULOCYTES NFR BLD AUTO: 0.2 % (ref 0–0.5)
KETONES UR QL STRIP: ABNORMAL
LEUKOCYTE ESTERASE UR QL STRIP.AUTO: ABNORMAL
LIPASE SERPL-CCNC: 40 U/L (ref 13–60)
LYMPHOCYTES # BLD AUTO: 0.9 10*3/MM3 (ref 0.7–3.1)
LYMPHOCYTES NFR BLD AUTO: 21.6 % (ref 19.6–45.3)
MCH RBC QN AUTO: 30.2 PG (ref 26.6–33)
MCHC RBC AUTO-ENTMCNC: 31.5 G/DL (ref 31.5–35.7)
MCV RBC AUTO: 96 FL (ref 79–97)
MONOCYTES # BLD AUTO: 0.41 10*3/MM3 (ref 0.1–0.9)
MONOCYTES NFR BLD AUTO: 9.9 % (ref 5–12)
NEUTROPHILS # BLD AUTO: 2.72 10*3/MM3 (ref 1.7–7)
NEUTROPHILS NFR BLD AUTO: 65.4 % (ref 42.7–76)
NITRITE UR QL STRIP: NEGATIVE
NRBC BLD AUTO-RTO: 0 /100 WBC (ref 0–0.2)
PH UR STRIP.AUTO: <=5 [PH] (ref 5–8)
PLATELET # BLD AUTO: 207 10*3/MM3 (ref 140–450)
PMV BLD AUTO: 9.7 FL (ref 6–12)
POTASSIUM BLD-SCNC: 4.5 MMOL/L (ref 3.5–5.2)
PROT SERPL-MCNC: 7.1 G/DL (ref 6–8.5)
PROT UR QL STRIP: NEGATIVE
RBC # BLD AUTO: 4.2 10*6/MM3 (ref 3.77–5.28)
RBC # UR: ABNORMAL /HPF
REF LAB TEST METHOD: ABNORMAL
SODIUM BLD-SCNC: 138 MMOL/L (ref 136–145)
SP GR UR STRIP: 1.02 (ref 1–1.03)
SQUAMOUS #/AREA URNS HPF: ABNORMAL /HPF
UROBILINOGEN UR QL STRIP: ABNORMAL
WBC NRBC COR # BLD: 4.16 10*3/MM3 (ref 3.4–10.8)
WBC UR QL AUTO: ABNORMAL /HPF
WHOLE BLOOD HOLD SPECIMEN: NORMAL
WHOLE BLOOD HOLD SPECIMEN: NORMAL

## 2019-08-09 PROCEDURE — 80053 COMPREHEN METABOLIC PANEL: CPT | Performed by: EMERGENCY MEDICINE

## 2019-08-09 PROCEDURE — 83690 ASSAY OF LIPASE: CPT | Performed by: EMERGENCY MEDICINE

## 2019-08-09 PROCEDURE — 99283 EMERGENCY DEPT VISIT LOW MDM: CPT

## 2019-08-09 PROCEDURE — 85025 COMPLETE CBC W/AUTO DIFF WBC: CPT | Performed by: EMERGENCY MEDICINE

## 2019-08-09 PROCEDURE — 81001 URINALYSIS AUTO W/SCOPE: CPT | Performed by: EMERGENCY MEDICINE

## 2019-08-09 PROCEDURE — 74176 CT ABD & PELVIS W/O CONTRAST: CPT

## 2019-08-09 RX ORDER — SODIUM CHLORIDE 0.9 % (FLUSH) 0.9 %
10 SYRINGE (ML) INJECTION AS NEEDED
Status: DISCONTINUED | OUTPATIENT
Start: 2019-08-09 | End: 2019-08-09 | Stop reason: HOSPADM

## 2019-08-09 RX ORDER — CIPROFLOXACIN 500 MG/1
500 TABLET, FILM COATED ORAL 2 TIMES DAILY
Qty: 14 TABLET | Refills: 0 | Status: SHIPPED | OUTPATIENT
Start: 2019-08-09 | End: 2020-01-24

## 2019-08-09 RX ORDER — ONDANSETRON 4 MG/1
4 TABLET, FILM COATED ORAL EVERY 8 HOURS PRN
Qty: 20 TABLET | Refills: 0 | Status: SHIPPED | OUTPATIENT
Start: 2019-08-09 | End: 2020-01-24

## 2019-08-09 RX ORDER — METRONIDAZOLE 500 MG/1
500 TABLET ORAL 3 TIMES DAILY
Qty: 21 TABLET | Refills: 0 | Status: SHIPPED | OUTPATIENT
Start: 2019-08-09 | End: 2020-01-24

## 2019-08-09 NOTE — DISCHARGE INSTRUCTIONS
[place] diet, low fiber diet for the next week, increase your fluid intake.  Avoid dairy/fried foods.  Follow-up with Dr. Raya, call his office today to schedule follow-up appointment.  Follow-up with your primary care provider today as well, recheck in 3 to 4 days.  Return to the emergency department immediately if any change or worsening of symptoms

## 2019-08-09 NOTE — ED PROVIDER NOTES
Subjective   74-year-old female presents emergency department complaining of low abdominal pain, intermittent hard stool followed by normal BMs and liquid diarrhea for the past 2 to 3 months intermittently.  Symptoms worsened yesterday with 3-4 diarrheal episodes, low abdominal pain and cramps, particularly in the left lower quadrant.  This morning she states her stool was green and watery.  She did feel some chills and sweats with some fatigue yesterday.  No vomiting.  No dysuria hematuria pyuria or flank pain.  No vaginal discharge or bleeding.  No history of immunocompromise.  Past medical history is otherwise remarkable for arthritis.  Followed by .  She denies drug allergies.            Review of Systems   Constitutional: Positive for appetite change. Negative for activity change, chills, diaphoresis and fever.   HENT: Negative for congestion and sore throat.    Respiratory: Negative for cough, choking, chest tightness, shortness of breath and wheezing.    Cardiovascular: Negative for chest pain and leg swelling.   Gastrointestinal: Positive for abdominal pain. Negative for abdominal distention, anal bleeding, blood in stool, constipation, diarrhea, nausea and vomiting.        Low abd  pain per HPI   Genitourinary: Negative for difficulty urinating, dysuria, flank pain, frequency, hematuria and urgency.   Musculoskeletal: Negative for neck stiffness.   Neurological: Negative for dizziness, seizures, syncope, speech difficulty, weakness, light-headedness, numbness and headaches.   Psychiatric/Behavioral: Negative for confusion.   All other systems reviewed and are negative.      Past Medical History:   Diagnosis Date   • Arthritis    • H/O bone density study    • Hip pain    • History of colonoscopy    • History of mammogram    • Impacted cerumen of right ear     Ears needs rinsed out, feels clogged two days.    • Inflammatory arthritis    • Lumbago    • Lumbar degenerative disc disease    •  Metatarsalgia, left foot    • Osteoarthritis of knees, bilateral    • Pelvic fracture (CMS/HCC)    • Primary osteoarthritis of both hips    • Sciatica    • Thoracic spondylosis    • Wears glasses        No Known Allergies    Past Surgical History:   Procedure Laterality Date   • COLONOSCOPY     • EYE SURGERY     • FRACTURE SURGERY      left wrist; pelvis   • TONSILLECTOMY     • TOTAL HIP ARTHROPLASTY Right 10/3/2017    Procedure: TOTAL HIP ARTHROPLASTY RIGHT;  Surgeon: Prem Hodges MD;  Location: [facility];  Service:    • TUBAL ABDOMINAL LIGATION         Family History   Problem Relation Age of Onset   • Heart failure Mother         CHF   • Osteoarthritis Mother    • Heart attack Father         Acute MI   • Stroke Father         Stroke syndrome   • Ovarian cancer Neg Hx        Social History     Socioeconomic History   • Marital status:      Spouse name: Not on file   • Number of children: Not on file   • Years of education: Not on file   • Highest education level: Not on file   Tobacco Use   • Smoking status: Never Smoker   • Smokeless tobacco: Never Used   Substance and Sexual Activity   • Alcohol use: No   • Drug use: No   • Sexual activity: Defer           Objective   Physical Exam   Constitutional: She is oriented to person, place, and time. She appears well-developed and well-nourished. No distress.   HENT:   Head: Normocephalic and atraumatic.   Right Ear: External ear normal.   Left Ear: External ear normal.   Nose: Nose normal.   Mouth/Throat: Oropharynx is clear and moist. No oropharyngeal exudate.   Eyes: Conjunctivae and EOM are normal. Pupils are equal, round, and reactive to light. Right eye exhibits no discharge. Left eye exhibits no discharge. No scleral icterus.   Neck: Normal range of motion. Neck supple. No JVD present. No tracheal deviation present. No thyromegaly present.   Cardiovascular: Normal rate.   Pulmonary/Chest: Effort normal. No stridor. No respiratory distress.   Abdominal:  Soft. Normal appearance. She exhibits no distension. There is no hepatosplenomegaly. There is tenderness in the left lower quadrant. There is no rigidity, no rebound, no guarding, no CVA tenderness, no tenderness at McBurney's point and negative Emanuel's sign. Hernia confirmed negative in the ventral area.   Musculoskeletal: Normal range of motion. She exhibits no edema, tenderness or deformity.   Neurological: She is alert and oriented to person, place, and time. No cranial nerve deficit. She exhibits normal muscle tone. Coordination normal.   Skin: Skin is warm and dry. No rash noted. She is not diaphoretic. No erythema. No pallor.   Psychiatric: She has a normal mood and affect. Her behavior is normal. Judgment and thought content normal.   Nursing note and vitals reviewed.      Procedures           ED Course          Recent Results (from the past 24 hour(s))   Urinalysis With Microscopic If Indicated (No Culture) - Urine, Clean Catch    Collection Time: 08/09/19  9:00 AM   Result Value Ref Range    Color, UA Yellow Yellow, Straw    Appearance, UA Clear Clear    pH, UA <=5.0 5.0 - 8.0    Specific Gravity, UA 1.019 1.001 - 1.030    Glucose, UA Negative Negative    Ketones, UA Trace (A) Negative    Bilirubin, UA Negative Negative    Blood, UA Negative Negative    Protein, UA Negative Negative    Leuk Esterase, UA Moderate (2+) (A) Negative    Nitrite, UA Negative Negative    Urobilinogen, UA 0.2 E.U./dL 0.2 - 1.0 E.U./dL   Urinalysis, Microscopic Only - Urine, Clean Catch    Collection Time: 08/09/19  9:00 AM   Result Value Ref Range    RBC, UA 0-2 None Seen, 0-2 /HPF    WBC, UA 3-5 (A) None Seen, 0-2 /HPF    Bacteria, UA None Seen None Seen, Trace /HPF    Squamous Epithelial Cells, UA 0-2 None Seen, 0-2 /HPF    Hyaline Casts, UA 7-12 0 - 6 /LPF    Methodology Manual Light Microscopy    Comprehensive Metabolic Panel    Collection Time: 08/09/19  9:25 AM   Result Value Ref Range    Glucose 107 (H) 65 - 99 mg/dL     BUN 25 (H) 8 - 23 mg/dL    Creatinine 0.80 0.57 - 1.00 mg/dL    Sodium 138 136 - 145 mmol/L    Potassium 4.5 3.5 - 5.2 mmol/L    Chloride 101 98 - 107 mmol/L    CO2 27.0 22.0 - 29.0 mmol/L    Calcium 9.4 8.6 - 10.5 mg/dL    Total Protein 7.1 6.0 - 8.5 g/dL    Albumin 4.00 3.50 - 5.20 g/dL    ALT (SGPT) 16 1 - 33 U/L    AST (SGOT) 21 1 - 32 U/L    Alkaline Phosphatase 56 39 - 117 U/L    Total Bilirubin 0.6 0.2 - 1.2 mg/dL    eGFR Non African Amer 70 >60 mL/min/1.73    Globulin 3.1 gm/dL    A/G Ratio 1.3 g/dL    BUN/Creatinine Ratio 31.3 (H) 7.0 - 25.0    Anion Gap 10.0 5.0 - 15.0 mmol/L   Lipase    Collection Time: 08/09/19  9:25 AM   Result Value Ref Range    Lipase 40 13 - 60 U/L   Light Blue Top    Collection Time: 08/09/19  9:25 AM   Result Value Ref Range    Extra Tube hold for add-on    Green Top (Gel)    Collection Time: 08/09/19  9:25 AM   Result Value Ref Range    Extra Tube Hold for add-ons.    Lavender Top    Collection Time: 08/09/19  9:25 AM   Result Value Ref Range    Extra Tube hold for add-on    Gold Top - SST    Collection Time: 08/09/19  9:25 AM   Result Value Ref Range    Extra Tube Hold for add-ons.    CBC Auto Differential    Collection Time: 08/09/19  9:25 AM   Result Value Ref Range    WBC 4.16 3.40 - 10.80 10*3/mm3    RBC 4.20 3.77 - 5.28 10*6/mm3    Hemoglobin 12.7 12.0 - 15.9 g/dL    Hematocrit 40.3 34.0 - 46.6 %    MCV 96.0 79.0 - 97.0 fL    MCH 30.2 26.6 - 33.0 pg    MCHC 31.5 31.5 - 35.7 g/dL    RDW 13.1 12.3 - 15.4 %    RDW-SD 46.4 37.0 - 54.0 fl    MPV 9.7 6.0 - 12.0 fL    Platelets 207 140 - 450 10*3/mm3    Neutrophil % 65.4 42.7 - 76.0 %    Lymphocyte % 21.6 19.6 - 45.3 %    Monocyte % 9.9 5.0 - 12.0 %    Eosinophil % 2.4 0.3 - 6.2 %    Basophil % 0.5 0.0 - 1.5 %    Immature Grans % 0.2 0.0 - 0.5 %    Neutrophils, Absolute 2.72 1.70 - 7.00 10*3/mm3    Lymphocytes, Absolute 0.90 0.70 - 3.10 10*3/mm3    Monocytes, Absolute 0.41 0.10 - 0.90 10*3/mm3    Eosinophils, Absolute 0.10 0.00 -  "0.40 10*3/mm3    Basophils, Absolute 0.02 0.00 - 0.20 10*3/mm3    Immature Grans, Absolute 0.01 0.00 - 0.05 10*3/mm3    nRBC 0.0 0.0 - 0.2 /100 WBC     Note: In addition to lab results from this visit, the labs listed above may include labs taken at another facility or during a different encounter within the last 24 hours. Please correlate lab times with ED admission and discharge times for further clarification of the services performed during this visit.    CT Abdomen Pelvis Without Contrast   Preliminary Result   1. Nonspecific fluid-filled loops of small bowel with scattered   air-fluid levels identified. The small bowel is not dilated and no   evidence of small bowel obstruction identified at this time. These   findings may relate to underlying early ileus versus enteritis in the   setting of reported diarrhea.       2. Nonspecific enlarged mesenteric lymph nodes which may be related to   this process or possibly mesenteric adenitis.       3. Small hiatal hernia.       D:  08/09/2019   E:  08/09/2019            Vitals:    08/09/19 0850   BP: 131/63   BP Location: Left arm   Patient Position: Sitting   Pulse: 65   Resp: 18   Temp: 98 °F (36.7 °C)   TempSrc: Oral   SpO2: 99%   Weight: 68 kg (150 lb)   Height: 165.1 cm (65\")     Medications   sodium chloride 0.9 % flush 10 mL (not administered)     ECG/EMG Results (last 24 hours)     ** No results found for the last 24 hours. **        No orders to display               MDM      Final diagnoses:   Colitis   Acute diarrhea            Angelito Dunaway PA-C  08/09/19 1157    "

## 2019-10-03 DIAGNOSIS — K90.0 CELIAC DISEASE: Primary | ICD-10-CM

## 2019-10-13 PROBLEM — K90.0 CELIAC DISEASE: Status: ACTIVE | Noted: 2019-10-13

## 2019-10-14 ENCOUNTER — APPOINTMENT (OUTPATIENT)
Dept: NUTRITION | Facility: HOSPITAL | Age: 74
End: 2019-10-14

## 2019-10-17 ENCOUNTER — HOSPITAL ENCOUNTER (OUTPATIENT)
Dept: NUTRITION | Facility: HOSPITAL | Age: 74
Setting detail: RECURRING SERIES
Discharge: HOME | End: 2019-10-17

## 2019-10-17 VITALS — WEIGHT: 146 LBS | BODY MASS INDEX: 24.32 KG/M2 | HEIGHT: 65 IN

## 2019-10-17 PROCEDURE — 97802 MEDICAL NUTRITION INDIV IN: CPT | Performed by: DIETITIAN, REGISTERED

## 2019-10-25 ENCOUNTER — TELEPHONE (OUTPATIENT)
Dept: NUTRITION | Facility: HOSPITAL | Age: 74
End: 2019-10-25

## 2019-10-25 NOTE — PROGRESS NOTES
Patient calls in with questions regarding gluten free diet for celiac disease. Reports she has been doing well with transitioning to gluten free even on her vacation this week in [place]. States she packed extra food/snacks with her that are safe. She questions malt dextrin, flavored yogurts, cheerios, and roasted nuts. RD recommends avoiding malt dextrin, but discussed how the yogurt, cheerios, and plain roasted nuts most likely will be safe. Reviewed ingredients to look for that may contain gluten. Patient verbalizes understanding and has no further questions at this time. Patient is encouraged to call RD as needed.

## 2019-10-27 ENCOUNTER — TELEPHONE (OUTPATIENT)
Dept: FAMILY MEDICINE CLINIC | Facility: CLINIC | Age: 74
End: 2019-10-27

## 2019-10-27 NOTE — TELEPHONE ENCOUNTER
VM left for pt to call me back.     ----- Message from [facility] sent at 10/26/2019  9:21 PM EDT -----  Regarding: RE: HANDICAP PARKING PERMIT FORM  Contact: [phone]  Please call patient and let her know that her parking permit has been mailed out. She should have it any day.  Thanks  ----- Message -----  From: Roxy Harris MA  Sent: 10/22/2019   8:51 AM  To: [facility]  Subject: FW: HANDICAP PARKING PERMIT FORM                     ----- Message -----  From: Marlena Gaston  Sent: 10/18/2019  10:34 AM  To: Roxy Harris MA  Subject: HANDICAP PARKING PERMIT FORM                     Patient dropped off paperwork to be filled out for handicap permit.  She wanted to know if it has been completed by Sherice dumont.  Please call and advise @ [phone].  If not, she will be out of town all next week and needs it finished and mailed back to her so it is there when she gets home because there is a time limit on getting it completed.  Thank you.

## 2019-10-28 ENCOUNTER — TELEPHONE (OUTPATIENT)
Dept: FAMILY MEDICINE CLINIC | Facility: CLINIC | Age: 74
End: 2019-10-28

## 2019-10-28 NOTE — TELEPHONE ENCOUNTER
LVM advising form has been mailed out and if she does not receive it to call us back and follow up.       ----- Message from Vaishnavi Person MA sent at 10/27/2019  3:20 PM EDT -----  Regarding: FW: HANDICAP PARKING PERMIT FORM  Contact: [phone]  Massiel I tried to call pt and left a vm for the pt to call me back. If you dont mind can you try again?   Thank you  ----- Message -----  From: Sherice Juan APRN  Sent: 10/26/2019   9:21 PM  To: Vaihsnavi Person MA  Subject: RE: HANDICAP PARKING PERMIT FORM                 Please call patient and let her know that her parking permit has been mailed out. She should have it any day.  Thanks  ----- Message -----  From: Roxy Harris MA  Sent: 10/22/2019   8:51 AM  To: [facility]  Subject: FW: HANDICAP PARKING PERMIT FORM                     ----- Message -----  From: Marlena Gaston  Sent: 10/18/2019  10:34 AM  To: Roxy Harris MA  Subject: HANDICAP PARKING PERMIT FORM                     Patient dropped off paperwork to be filled out for handicap permit.  She wanted to know if it has been completed by Sherice dumont.  Please call and advise @ [phone].  If not, she will be out of town all next week and needs it finished and mailed back to her so it is there when she gets home because there is a time limit on getting it completed.  Thank you.

## 2019-11-14 ENCOUNTER — HOSPITAL ENCOUNTER (OUTPATIENT)
Dept: NUTRITION | Facility: HOSPITAL | Age: 74
Setting detail: RECURRING SERIES
Discharge: HOME | End: 2019-11-14

## 2019-11-14 VITALS — BODY MASS INDEX: 24.49 KG/M2 | HEIGHT: 65 IN | WEIGHT: 147 LBS

## 2020-01-06 ENCOUNTER — OFFICE VISIT (OUTPATIENT)
Dept: ORTHOPEDIC SURGERY | Facility: CLINIC | Age: 75
End: 2020-01-06

## 2020-01-06 VITALS — WEIGHT: 146.6 LBS | HEART RATE: 90 BPM | OXYGEN SATURATION: 98 % | BODY MASS INDEX: 24.43 KG/M2 | HEIGHT: 65 IN

## 2020-01-06 DIAGNOSIS — M16.12 PRIMARY OSTEOARTHRITIS OF LEFT HIP: Primary | ICD-10-CM

## 2020-01-06 PROCEDURE — 99214 OFFICE O/P EST MOD 30 MIN: CPT | Performed by: ORTHOPAEDIC SURGERY

## 2020-01-06 RX ORDER — MELOXICAM 7.5 MG/1
15 TABLET ORAL ONCE
Status: CANCELLED | OUTPATIENT
Start: 2020-01-06 | End: 2020-01-06

## 2020-01-06 RX ORDER — PREGABALIN 150 MG/1
150 CAPSULE ORAL ONCE
Status: CANCELLED | OUTPATIENT
Start: 2020-01-06 | End: 2020-01-06

## 2020-01-06 RX ORDER — ACETAMINOPHEN 325 MG/1
1000 TABLET ORAL ONCE
Status: CANCELLED | OUTPATIENT
Start: 2020-01-06 | End: 2020-01-06

## 2020-01-06 NOTE — PROGRESS NOTES
[facility] Note    Subjective     Chief Complaint   Patient presents with   • Left Hip - Pain        HPI    Jacques Yeboah is a 74 y.o. female.  She presents for evaluation of left hip pain.  Pain is been present for several months, following no particular injury.  The pain is worsening over time, and is 7-8 out of 10, worse with walking and standing, with no improvement with anti-inflammatories and conservative treatment.  She would like to proceed with hip replacement surgery.  Of note, she did quite well following a right total hip arthroplasty in 2017.      Patient Active Problem List   Diagnosis   • Hyperlipidemia   • Arthritis of right hip   • Status post total replacement of right hip   • Acute blood loss anemia, mild, asymptomatic   • Menopausal and postmenopausal disorder   • Osteoporosis   • Celiac disease   • Primary osteoarthritis of left hip     Past Medical History:   Diagnosis Date   • Arthritis    • H/O bone density study    • Hip pain    • History of colonoscopy    • History of mammogram    • Impacted cerumen of right ear     Ears needs rinsed out, feels clogged two days.    • Inflammatory arthritis    • Lumbago    • Lumbar degenerative disc disease    • Metatarsalgia, left foot    • Osteoarthritis of knees, bilateral    • Pelvic fracture (CMS/HCC)    • Primary osteoarthritis of both hips    • Sciatica    • Thoracic spondylosis    • Wears glasses       Past Surgical History:   Procedure Laterality Date   • COLONOSCOPY     • EYE SURGERY     • FRACTURE SURGERY      left wrist; pelvis   • TONSILLECTOMY     • TOTAL HIP ARTHROPLASTY Right 10/3/2017    Procedure: TOTAL HIP ARTHROPLASTY RIGHT;  Surgeon: Prem Hodges MD;  Location: [facility];  Service:    • TUBAL ABDOMINAL LIGATION        Family History   Problem Relation Age of Onset   • Heart failure Mother         CHF   • Osteoarthritis Mother    • Heart attack Father         Acute MI   • Stroke Father         Stroke  syndrome   • Ovarian cancer Neg Hx      Social History     Socioeconomic History   • Marital status:      Spouse name: Not on file   • Number of children: Not on file   • Years of education: Not on file   • Highest education level: Not on file   Tobacco Use   • Smoking status: Never Smoker   • Smokeless tobacco: Never Used   Substance and Sexual Activity   • Alcohol use: No   • Drug use: No   • Sexual activity: Defer      Current Outpatient Medications on File Prior to Visit   Medication Sig Dispense Refill   • ciprofloxacin (CIPRO) 500 MG tablet Take 1 tablet by mouth 2 (Two) Times a Day. 14 tablet 0   • metroNIDAZOLE (FLAGYL) 500 MG tablet Take 1 tablet by mouth 3 (Three) Times a Day. 21 tablet 0   • ondansetron (ZOFRAN) 4 MG tablet Take 1 tablet by mouth Every 8 (Eight) Hours As Needed for Nausea or Vomiting. 20 tablet 0   • simvastatin (ZOCOR) 20 MG tablet Take 1 tablet by mouth Every Night. 90 tablet 2     No current facility-administered medications on file prior to visit.       No Known Allergies     Review of Systems   Constitutional: Negative.  Negative for activity change, appetite change, chills, diaphoresis, fatigue, fever and unexpected weight change.   HENT: Negative.  Negative for congestion, dental problem, drooling, ear discharge, ear pain, facial swelling, hearing loss, mouth sores, nosebleeds, postnasal drip, rhinorrhea, sinus pressure, sneezing, sore throat, tinnitus, trouble swallowing and voice change.    Eyes: Negative.  Negative for photophobia, pain, discharge, redness, itching and visual disturbance.   Respiratory: Negative.  Negative for apnea, cough, choking, chest tightness, shortness of breath, wheezing and stridor.    Cardiovascular: Negative.  Negative for chest pain, palpitations and leg swelling.   Gastrointestinal: Negative.  Negative for abdominal distention, abdominal pain, anal bleeding, blood in stool, constipation, diarrhea, nausea, rectal pain and vomiting.  "  Endocrine: Negative.  Negative for cold intolerance, heat intolerance, polydipsia, polyphagia and polyuria.   Genitourinary: Negative.  Negative for decreased urine volume, difficulty urinating, dysuria, enuresis, flank pain, frequency, genital sores, hematuria and urgency.   Musculoskeletal: Positive for arthralgias. Negative for back pain, gait problem, joint swelling, myalgias, neck pain and neck stiffness.   Skin: Negative.  Negative for color change, pallor, rash and wound.   Allergic/Immunologic: Negative.  Negative for environmental allergies, food allergies and immunocompromised state.   Neurological: Negative.  Negative for dizziness, tremors, seizures, syncope, facial asymmetry, speech difficulty, weakness, light-headedness, numbness and headaches.   Hematological: Negative.  Negative for adenopathy. Does not bruise/bleed easily.   Psychiatric/Behavioral: Negative.  Negative for agitation, behavioral problems, confusion, decreased concentration, dysphoric mood, hallucinations, self-injury, sleep disturbance and suicidal ideas. The patient is not nervous/anxious and is not hyperactive.         Objective      Physical Exam  Pulse 90   Ht 165.1 cm (65\")   Wt 66.5 kg (146 lb 9.6 oz)   SpO2 98%   BMI 24.40 kg/m²     Body mass index is 24.4 kg/m².    General:   Mental Status:  Alert   Appearance: Cooperative, in no acute distress   Build and Nutrition: Well-nourished well-developed female   Orientation: Alert and oriented to person, place and time   Posture: Normal   Gait: Limping on the left, mild    Integument:   Left hip: No skin lesions, no rash, no ecchymosis    Neurologic:   Sensation:    Left foot: Intact to light touch on the dorsal and plantar aspect   Motor:  Left lower extremity: 5/5 quadriceps, hamstrings, ankle dorsiflexors, and ankle plantar flexors  Vascular:   Left lower extremity: 2+ dorsalis pedis pulse, prompt capillary refill    Lower Extremity:   Left Hip:    Tenderness: "  None    Swelling:  None    Crepitus:  None    Atrophy:  None    Range of motion:  External Rotation: 30°       Internal Rotation: 30°, with pain in the groin       Flexion:  100°       Extension:  0°   Instability:  None  Deformities:  None  Functional testing: Positive Stinchfield    Slightly short on the left compared to the right      Imaging/Studies  Imaging Results (Last 24 Hours)     Procedure Component Value Units Date/Time    XR Hip With or Without Pelvis 2 - 3 View Left [[id number]] Resulted:  01/06/20 1158     Updated:  01/06/20 1159    Narrative:       Left Hip Radiographs  Indication: left hip pain  Views: low AP pelvis and lateral of the left hip    Comparison: AP pelvis from 10/15/2018    Findings:   Arthritic changes, with bone-on-bone contact, no significant change   compared to previous films, with evidence of old healed inferior and   superior rami fractures on the right, with no unusual bony features.            Assessment and Plan     Jacques was seen today for pain.    Diagnoses and all orders for this visit:    Primary osteoarthritis of left hip  -     XR Hip With or Without Pelvis 2 - 3 View Left  -     Case Request; Standing  -     Instructions on coughing, deep breathing, and incentive spirometry.; Future  -     CBC and Differential; Future  -     Basic metabolic panel; Future  -     Protime-INR; Future  -     APTT; Future  -     Hemoglobin A1c; Future  -     Sedimentation rate; Future  -     C-reactive protein; Future  -     Urinalysis With Culture If Indicated -; Future  -     Case Request    Other orders  -     Follow Anesthesia Guidelines / Standing Orders; Future  -     Obtain informed consent  -     Provide instructions to patient regarding NPO status  -     Chlorhexidine Skin Prep - Educate and Review With Patient; Future  -     Provide Patient With ERAS Hydration Instructions  -     Provide Patient With Enhanced Recovery Booklet(s) or Handout  -     Provide Instructions/Handout For  Benzoyl Peroxide 5% Wash If Having Shoulder/Arm Surgery (If Prescribed)  -     Provide Instructions/Handout For Bactroban And Chlorhexidine Shower (If Prescribed)  -     Perform A Memory Screening On All Hip/Knee Replacement Patients >Or Equal To 65 Years Or Older  -     Complete A PROMIS And HOOS Or KOOS Survey If Having Hip Or Knee Replacement  -     Care Order / Instruction for all Female Patients  -     NPO After Midnight  -     Provide Patient With Carbo Loading Instructions  -     Provide Patient With ERAS Booklet(s)/Handout  -     mupirocin (BACTROBAN) 2 % ointment; Apply into the nostril(s) as directed by provider 2 (Two) Times a Day.  -     Chlorhexidine Gluconate 4 % solution; Shower with hibiclens solution as directed for 5 days prior to surgery        1. Primary osteoarthritis of left hip        I reviewed my findings with the patient today.  Her left hip is bothering her to the point where she would like to have hip replacement surgery.  Risk, benefits, and alternatives to the procedure been discussed.  She would like to proceed.  Please see my counseling note for details.    Surgical Counseling     I have informed the patient of the diagnosis and the prognosis.  Exhaustive conservative treatment modalities have not resulted in long term pain relief.  The symptoms have progressed to the point of daily pain and inability to perform activities of daily living without significant pain.  The patient has reached the point of desiring to proceed with total hip arthroplasty after discussing the risks, benefits and alternatives to the procedure.  The surgical procedure itself was discussed in detail.  Risks of the procedure were discussed, which included but are not limited to, bleeding, infection, damage to blood vessels and nerves, incomplete pain relief, loosening of the prosthesis, deep infection, need for further surgery, leg length discrepancy, hip dislocation, loss of limb, deep venous thrombosis,  pulmonary embolus, death, heart attack, stroke, kidney failure, liver failure, and anesthetic complications.  In addition, the potential for deep infection developing in the future was discussed, which could require further surgery.  The hip would have to be re-opened, debrided, and potentially remove the prosthesis, which may or may not be replaced in the future.  Also, the possibility for loosening of the prosthesis has been mentioned.  If the prosthesis loosened, a revision arthroplasty could be performed, with results that are not as predictable compared to the original procedure.  The typical rehabilitative course has also been discussed, and full recovery may take up to a year to see the maximum benefit.  The importance of patient cooperation in the rehabilitative efforts has also been discussed.  No guarantees whatsoever were given.  The patient understands the potential risks versus the benefits and desires to proceed with total hip arthroplasty at a mutually convenient time.    Return for For surgery as planned.      Medical Decision Making  Management Options : major surgery with risk factors  Data/Risk: radiology tests and independent visualization of imaging, lab tests, or EMG/NCV      Prem Hodges MD  01/06/20  5:07 PM

## 2020-01-23 ENCOUNTER — LAB (OUTPATIENT)
Dept: FAMILY MEDICINE CLINIC | Facility: CLINIC | Age: 75
End: 2020-01-23

## 2020-01-23 DIAGNOSIS — Z13.220 LIPID SCREENING: ICD-10-CM

## 2020-01-23 DIAGNOSIS — Z00.00 HEALTHCARE MAINTENANCE: Primary | ICD-10-CM

## 2020-01-23 DIAGNOSIS — M16.12 PRIMARY OSTEOARTHRITIS OF LEFT HIP: ICD-10-CM

## 2020-01-23 LAB
ALBUMIN SERPL-MCNC: 4.3 G/DL (ref 3.5–5.2)
ALBUMIN/GLOB SERPL: 1.7 G/DL
ALP SERPL-CCNC: 65 U/L (ref 39–117)
ALT SERPL W P-5'-P-CCNC: 21 U/L (ref 1–33)
ANION GAP SERPL CALCULATED.3IONS-SCNC: 12.5 MMOL/L (ref 5–15)
AST SERPL-CCNC: 18 U/L (ref 1–32)
BASOPHILS # BLD AUTO: 0.04 10*3/MM3 (ref 0–0.2)
BASOPHILS NFR BLD AUTO: 1.1 % (ref 0–1.5)
BILIRUB SERPL-MCNC: 0.3 MG/DL (ref 0.2–1.2)
BUN BLD-MCNC: 21 MG/DL (ref 8–23)
BUN/CREAT SERPL: 26.6 (ref 7–25)
CALCIUM SPEC-SCNC: 9.4 MG/DL (ref 8.6–10.5)
CHLORIDE SERPL-SCNC: 103 MMOL/L (ref 98–107)
CHOLEST SERPL-MCNC: 146 MG/DL (ref 0–200)
CO2 SERPL-SCNC: 27.5 MMOL/L (ref 22–29)
CREAT BLD-MCNC: 0.79 MG/DL (ref 0.57–1)
DEPRECATED RDW RBC AUTO: 41.2 FL (ref 37–54)
EOSINOPHIL # BLD AUTO: 0.07 10*3/MM3 (ref 0–0.4)
EOSINOPHIL NFR BLD AUTO: 1.9 % (ref 0.3–6.2)
ERYTHROCYTE [DISTWIDTH] IN BLOOD BY AUTOMATED COUNT: 12.1 % (ref 12.3–15.4)
GFR SERPL CREATININE-BSD FRML MDRD: 71 ML/MIN/1.73
GLOBULIN UR ELPH-MCNC: 2.5 GM/DL
GLUCOSE BLD-MCNC: 95 MG/DL (ref 65–99)
HBA1C MFR BLD: 5.83 % (ref 4.8–5.6)
HCT VFR BLD AUTO: 39.8 % (ref 34–46.6)
HDLC SERPL QL: 2.52
HDLC SERPL-MCNC: 58 MG/DL (ref 40–60)
HGB BLD-MCNC: 13.3 G/DL (ref 12–15.9)
IMM GRANULOCYTES # BLD AUTO: 0 10*3/MM3 (ref 0–0.05)
IMM GRANULOCYTES NFR BLD AUTO: 0 % (ref 0–0.5)
LDLC SERPL CALC-MCNC: 75 MG/DL (ref 0–100)
LYMPHOCYTES # BLD AUTO: 1.04 10*3/MM3 (ref 0.7–3.1)
LYMPHOCYTES NFR BLD AUTO: 28.7 % (ref 19.6–45.3)
MCH RBC QN AUTO: 31.1 PG (ref 26.6–33)
MCHC RBC AUTO-ENTMCNC: 33.4 G/DL (ref 31.5–35.7)
MCV RBC AUTO: 93.2 FL (ref 79–97)
MONOCYTES # BLD AUTO: 0.32 10*3/MM3 (ref 0.1–0.9)
MONOCYTES NFR BLD AUTO: 8.8 % (ref 5–12)
NEUTROPHILS # BLD AUTO: 2.16 10*3/MM3 (ref 1.7–7)
NEUTROPHILS NFR BLD AUTO: 59.5 % (ref 42.7–76)
NRBC BLD AUTO-RTO: 0 /100 WBC (ref 0–0.2)
PLATELET # BLD AUTO: 237 10*3/MM3 (ref 140–450)
PMV BLD AUTO: 10.4 FL (ref 6–12)
POTASSIUM BLD-SCNC: 4.2 MMOL/L (ref 3.5–5.2)
PROT SERPL-MCNC: 6.8 G/DL (ref 6–8.5)
RBC # BLD AUTO: 4.27 10*6/MM3 (ref 3.77–5.28)
SODIUM BLD-SCNC: 143 MMOL/L (ref 136–145)
TRIGL SERPL-MCNC: 63 MG/DL (ref 0–150)
TSH SERPL DL<=0.05 MIU/L-ACNC: 2.44 UIU/ML (ref 0.27–4.2)
VIT B12 BLD-MCNC: 869 PG/ML (ref 211–946)
VLDLC SERPL-MCNC: 12.6 MG/DL (ref 5–40)
WBC NRBC COR # BLD: 3.63 10*3/MM3 (ref 3.4–10.8)

## 2020-01-23 PROCEDURE — 85025 COMPLETE CBC W/AUTO DIFF WBC: CPT | Performed by: NURSE PRACTITIONER

## 2020-01-23 PROCEDURE — 82607 VITAMIN B-12: CPT | Performed by: NURSE PRACTITIONER

## 2020-01-23 PROCEDURE — 80053 COMPREHEN METABOLIC PANEL: CPT | Performed by: NURSE PRACTITIONER

## 2020-01-23 PROCEDURE — 80061 LIPID PANEL: CPT | Performed by: NURSE PRACTITIONER

## 2020-01-23 PROCEDURE — 82652 VIT D 1 25-DIHYDROXY: CPT | Performed by: NURSE PRACTITIONER

## 2020-01-23 PROCEDURE — 83036 HEMOGLOBIN GLYCOSYLATED A1C: CPT | Performed by: NURSE PRACTITIONER

## 2020-01-23 PROCEDURE — 84443 ASSAY THYROID STIM HORMONE: CPT | Performed by: NURSE PRACTITIONER

## 2020-01-24 ENCOUNTER — OFFICE VISIT (OUTPATIENT)
Dept: FAMILY MEDICINE CLINIC | Facility: CLINIC | Age: 75
End: 2020-01-24

## 2020-01-24 VITALS
TEMPERATURE: 97.8 F | DIASTOLIC BLOOD PRESSURE: 64 MMHG | WEIGHT: 148 LBS | RESPIRATION RATE: 16 BRPM | OXYGEN SATURATION: 96 % | HEIGHT: 65 IN | BODY MASS INDEX: 24.66 KG/M2 | HEART RATE: 70 BPM | SYSTOLIC BLOOD PRESSURE: 134 MMHG

## 2020-01-24 DIAGNOSIS — E78.2 MIXED HYPERLIPIDEMIA: Primary | Chronic | ICD-10-CM

## 2020-01-24 PROCEDURE — 99214 OFFICE O/P EST MOD 30 MIN: CPT | Performed by: NURSE PRACTITIONER

## 2020-01-24 RX ORDER — SIMVASTATIN 20 MG
20 TABLET ORAL NIGHTLY
Qty: 90 TABLET | Refills: 3 | Status: SHIPPED | OUTPATIENT
Start: 2020-01-24 | End: 2021-01-29 | Stop reason: SDUPTHER

## 2020-01-27 LAB — 1,25(OH)2D3 SERPL-MCNC: 44.6 PG/ML (ref 19.9–79.3)

## 2020-02-11 ENCOUNTER — TELEPHONE (OUTPATIENT)
Dept: FAMILY MEDICINE CLINIC | Facility: CLINIC | Age: 75
End: 2020-02-11

## 2020-02-11 NOTE — TELEPHONE ENCOUNTER
Pt is requesting a copy of her lab results from Jan 23 be mailed to her.  Pt address in chart confirmed.

## 2020-02-12 ENCOUNTER — TELEPHONE (OUTPATIENT)
Dept: ORTHOPEDIC SURGERY | Facility: CLINIC | Age: 75
End: 2020-02-12

## 2020-02-12 NOTE — TELEPHONE ENCOUNTER
----- Message from Prem Hodges MD sent at 2/11/2020  3:53 PM EST -----  Thanks for letting me know.    ----- Message -----  From: Nati Sanders  Sent: 2/11/2020   3:34 PM EST  To: Prem Hodges MD    PATIENT CANCELLED 3/17/20 SURGERY, DUE TO A SICK . HE HAS LYMPHOMA

## 2020-02-18 NOTE — TELEPHONE ENCOUNTER
Pt calling back wanting to know why her lab results have not been mailed to her.  Pt requesting call back to know if they have been mailed out.

## 2020-03-04 ENCOUNTER — TRANSCRIBE ORDERS (OUTPATIENT)
Dept: ADMINISTRATIVE | Facility: HOSPITAL | Age: 75
End: 2020-03-04

## 2020-03-04 DIAGNOSIS — Z12.31 VISIT FOR SCREENING MAMMOGRAM: Primary | ICD-10-CM

## 2020-05-21 ENCOUNTER — APPOINTMENT (OUTPATIENT)
Dept: MAMMOGRAPHY | Facility: HOSPITAL | Age: 75
End: 2020-05-21

## 2020-08-13 ENCOUNTER — HOSPITAL ENCOUNTER (OUTPATIENT)
Dept: MAMMOGRAPHY | Facility: HOSPITAL | Age: 75
Discharge: HOME | End: 2020-08-13
Admitting: INTERNAL MEDICINE

## 2020-08-13 DIAGNOSIS — Z12.31 VISIT FOR SCREENING MAMMOGRAM: ICD-10-CM

## 2020-08-13 PROCEDURE — 77067 SCR MAMMO BI INCL CAD: CPT

## 2020-08-13 PROCEDURE — 77063 BREAST TOMOSYNTHESIS BI: CPT | Performed by: RADIOLOGY

## 2020-08-13 PROCEDURE — 77063 BREAST TOMOSYNTHESIS BI: CPT

## 2020-08-13 PROCEDURE — 77067 SCR MAMMO BI INCL CAD: CPT | Performed by: RADIOLOGY

## 2021-01-28 ENCOUNTER — LAB (OUTPATIENT)
Dept: LAB | Facility: HOSPITAL | Age: 76
End: 2021-01-28

## 2021-01-28 ENCOUNTER — OFFICE VISIT (OUTPATIENT)
Dept: INTERNAL MEDICINE | Facility: CLINIC | Age: 76
End: 2021-01-28

## 2021-01-28 VITALS
WEIGHT: 131.2 LBS | OXYGEN SATURATION: 99 % | SYSTOLIC BLOOD PRESSURE: 110 MMHG | BODY MASS INDEX: 22.4 KG/M2 | DIASTOLIC BLOOD PRESSURE: 62 MMHG | HEIGHT: 64 IN | HEART RATE: 69 BPM | TEMPERATURE: 96.9 F

## 2021-01-28 DIAGNOSIS — R73.09 ELEVATED GLUCOSE: ICD-10-CM

## 2021-01-28 DIAGNOSIS — E78.2 MIXED HYPERLIPIDEMIA: Chronic | ICD-10-CM

## 2021-01-28 DIAGNOSIS — K90.0 CELIAC DISEASE: ICD-10-CM

## 2021-01-28 DIAGNOSIS — Z00.00 ENCOUNTER FOR ANNUAL WELLNESS EXAM IN MEDICARE PATIENT: Primary | ICD-10-CM

## 2021-01-28 LAB
25(OH)D3 SERPL-MCNC: 59.4 NG/ML (ref 30–100)
ALBUMIN SERPL-MCNC: 4.5 G/DL (ref 3.5–5.2)
ALBUMIN/GLOB SERPL: 1.7 G/DL
ALP SERPL-CCNC: 64 U/L (ref 39–117)
ALT SERPL W P-5'-P-CCNC: 19 U/L (ref 1–33)
ANION GAP SERPL CALCULATED.3IONS-SCNC: 6.9 MMOL/L (ref 5–15)
AST SERPL-CCNC: 22 U/L (ref 1–32)
BASOPHILS # BLD AUTO: 0.04 10*3/MM3 (ref 0–0.2)
BASOPHILS NFR BLD AUTO: 0.9 % (ref 0–1.5)
BILIRUB SERPL-MCNC: 0.3 MG/DL (ref 0–1.2)
BUN SERPL-MCNC: 16 MG/DL (ref 8–23)
BUN/CREAT SERPL: 24.2 (ref 7–25)
CALCIUM SPEC-SCNC: 10.3 MG/DL (ref 8.6–10.5)
CHLORIDE SERPL-SCNC: 99 MMOL/L (ref 98–107)
CHOLEST SERPL-MCNC: 151 MG/DL (ref 0–200)
CO2 SERPL-SCNC: 31.1 MMOL/L (ref 22–29)
CREAT SERPL-MCNC: 0.66 MG/DL (ref 0.57–1)
DEPRECATED RDW RBC AUTO: 42.6 FL (ref 37–54)
EOSINOPHIL # BLD AUTO: 0.09 10*3/MM3 (ref 0–0.4)
EOSINOPHIL NFR BLD AUTO: 2 % (ref 0.3–6.2)
ERYTHROCYTE [DISTWIDTH] IN BLOOD BY AUTOMATED COUNT: 12.7 % (ref 12.3–15.4)
GFR SERPL CREATININE-BSD FRML MDRD: 87 ML/MIN/1.73
GLOBULIN UR ELPH-MCNC: 2.7 GM/DL
GLUCOSE SERPL-MCNC: 102 MG/DL (ref 65–99)
HBA1C MFR BLD: 5.8 % (ref 4.8–5.6)
HCT VFR BLD AUTO: 42.6 % (ref 34–46.6)
HDLC SERPL-MCNC: 63 MG/DL (ref 40–60)
HGB BLD-MCNC: 13.8 G/DL (ref 12–15.9)
IMM GRANULOCYTES # BLD AUTO: 0.01 10*3/MM3 (ref 0–0.05)
IMM GRANULOCYTES NFR BLD AUTO: 0.2 % (ref 0–0.5)
LDLC SERPL CALC-MCNC: 73 MG/DL (ref 0–100)
LDLC/HDLC SERPL: 1.14 {RATIO}
LYMPHOCYTES # BLD AUTO: 0.79 10*3/MM3 (ref 0.7–3.1)
LYMPHOCYTES NFR BLD AUTO: 17.5 % (ref 19.6–45.3)
MCH RBC QN AUTO: 30 PG (ref 26.6–33)
MCHC RBC AUTO-ENTMCNC: 32.4 G/DL (ref 31.5–35.7)
MCV RBC AUTO: 92.6 FL (ref 79–97)
MONOCYTES # BLD AUTO: 0.38 10*3/MM3 (ref 0.1–0.9)
MONOCYTES NFR BLD AUTO: 8.4 % (ref 5–12)
NEUTROPHILS NFR BLD AUTO: 3.21 10*3/MM3 (ref 1.7–7)
NEUTROPHILS NFR BLD AUTO: 71 % (ref 42.7–76)
NRBC BLD AUTO-RTO: 0 /100 WBC (ref 0–0.2)
PLATELET # BLD AUTO: 305 10*3/MM3 (ref 140–450)
PMV BLD AUTO: 10 FL (ref 6–12)
POTASSIUM SERPL-SCNC: 4.4 MMOL/L (ref 3.5–5.2)
PROT SERPL-MCNC: 7.2 G/DL (ref 6–8.5)
RBC # BLD AUTO: 4.6 10*6/MM3 (ref 3.77–5.28)
SODIUM SERPL-SCNC: 137 MMOL/L (ref 136–145)
TRIGL SERPL-MCNC: 81 MG/DL (ref 0–150)
TSH SERPL DL<=0.05 MIU/L-ACNC: 1.66 UIU/ML (ref 0.27–4.2)
VIT B12 BLD-MCNC: 1062 PG/ML (ref 211–946)
VLDLC SERPL-MCNC: 15 MG/DL (ref 5–40)
WBC # BLD AUTO: 4.52 10*3/MM3 (ref 3.4–10.8)

## 2021-01-28 PROCEDURE — G0439 PPPS, SUBSEQ VISIT: HCPCS | Performed by: INTERNAL MEDICINE

## 2021-01-28 PROCEDURE — 83036 HEMOGLOBIN GLYCOSYLATED A1C: CPT | Performed by: INTERNAL MEDICINE

## 2021-01-28 PROCEDURE — 82607 VITAMIN B-12: CPT | Performed by: INTERNAL MEDICINE

## 2021-01-28 PROCEDURE — 80061 LIPID PANEL: CPT | Performed by: INTERNAL MEDICINE

## 2021-01-28 PROCEDURE — 36415 COLL VENOUS BLD VENIPUNCTURE: CPT

## 2021-01-28 PROCEDURE — 82306 VITAMIN D 25 HYDROXY: CPT | Performed by: INTERNAL MEDICINE

## 2021-01-28 PROCEDURE — 84443 ASSAY THYROID STIM HORMONE: CPT | Performed by: INTERNAL MEDICINE

## 2021-01-28 PROCEDURE — 80053 COMPREHEN METABOLIC PANEL: CPT | Performed by: INTERNAL MEDICINE

## 2021-01-28 PROCEDURE — 85025 COMPLETE CBC W/AUTO DIFF WBC: CPT | Performed by: INTERNAL MEDICINE

## 2021-01-28 RX ORDER — AMOXICILLIN 500 MG/1
1 CAPSULE ORAL
COMMUNITY
Start: 2021-01-19 | End: 2022-08-21

## 2021-01-28 RX ORDER — SIMVASTATIN 20 MG
20 TABLET ORAL NIGHTLY
Qty: 90 TABLET | Refills: 3 | Status: CANCELLED | OUTPATIENT
Start: 2021-01-28

## 2021-01-29 DIAGNOSIS — E78.2 MIXED HYPERLIPIDEMIA: Chronic | ICD-10-CM

## 2021-01-29 RX ORDER — SIMVASTATIN 20 MG
20 TABLET ORAL NIGHTLY
Qty: 90 TABLET | Refills: 3 | Status: SHIPPED | OUTPATIENT
Start: 2021-01-29 | End: 2021-12-27

## 2021-03-11 ENCOUNTER — TELEPHONE (OUTPATIENT)
Dept: INTERNAL MEDICINE | Facility: CLINIC | Age: 76
End: 2021-03-11

## 2021-03-11 NOTE — TELEPHONE ENCOUNTER
I thought her cholesterol levels and sugar were very good, so I don't think she needs to make any major changes. Would try to exercise daily and eat healthy

## 2021-03-11 NOTE — TELEPHONE ENCOUNTER
PATIENT CALLED TO SEE WHAT SHE CAN DO TO HELP LOWER HER LABS RESULTS.      PATIENT'S CONTACT [phone]

## 2021-06-29 ENCOUNTER — TRANSCRIBE ORDERS (OUTPATIENT)
Dept: ADMINISTRATIVE | Facility: HOSPITAL | Age: 76
End: 2021-06-29

## 2021-06-29 DIAGNOSIS — Z12.31 VISIT FOR SCREENING MAMMOGRAM: Primary | ICD-10-CM

## 2021-08-20 ENCOUNTER — HOSPITAL ENCOUNTER (OUTPATIENT)
Dept: MAMMOGRAPHY | Facility: HOSPITAL | Age: 76
Discharge: HOME | End: 2021-08-20
Admitting: INTERNAL MEDICINE

## 2021-08-20 DIAGNOSIS — Z12.31 VISIT FOR SCREENING MAMMOGRAM: ICD-10-CM

## 2021-08-20 PROCEDURE — 77063 BREAST TOMOSYNTHESIS BI: CPT | Performed by: RADIOLOGY

## 2021-08-20 PROCEDURE — 77067 SCR MAMMO BI INCL CAD: CPT

## 2021-08-20 PROCEDURE — 77063 BREAST TOMOSYNTHESIS BI: CPT

## 2021-08-20 PROCEDURE — 77067 SCR MAMMO BI INCL CAD: CPT | Performed by: RADIOLOGY

## 2021-10-01 ENCOUNTER — TELEPHONE (OUTPATIENT)
Dept: ORTHOPEDIC SURGERY | Facility: CLINIC | Age: 76
End: 2021-10-01

## 2021-10-01 RX ORDER — CLINDAMYCIN HYDROCHLORIDE 300 MG/1
600 CAPSULE ORAL ONCE
Qty: 2 CAPSULE | Refills: 5 | Status: SHIPPED | OUTPATIENT
Start: 2021-10-01 | End: 2021-10-01

## 2021-10-01 NOTE — TELEPHONE ENCOUNTER
Dr Hodges:  It looks like she was taking amoxicillin.  We can change it to clindamycin, and see if that is tolerated better.  I put in a prescription for clindamycin.       I left a message for the patient and told her to call if she has any questions.    Janina Briggs

## 2021-10-01 NOTE — TELEPHONE ENCOUNTER
Provider: BAN  Caller: SANYA MUSE  Relationship to Patient: PATIENT  Pharmacy: N/A  Phone Number: [phone]  Reason for Call: PATIENT TAKES ANTIBIOTIC PRIOR TO EVERY DENTAL VISIT - SHE GOES EVERY 4 MONTHS.  PATIENT STATES THAT THE ANTIBIOTIC MAKES HER SICK FOR A FEW DAYS AFTER TAKING IT; GIVES HER DIAHHREA AND GENERALLY MAKES HER FEEL ILL.  PATIENT WOULD LIKE TO KNOW IF THERE IS AN ALTERNATIVE TO TAKING 4 PILLS AT ONCE; COULD SHE SPACE THEM FARTHER APART?  IE: ONE PER HOUR, ETC  IF YOU MISS PATIENT, PLEASE LEAVE HER VM  When was the patient last seen: 3.17.20    SIDE NOTE: PATIENT WANTED DR CEVALLOS TO KNOW THAT HER  PASSED IN December 2020

## 2021-12-09 ENCOUNTER — TELEPHONE (OUTPATIENT)
Dept: INTERNAL MEDICINE | Facility: CLINIC | Age: 76
End: 2021-12-09

## 2021-12-09 NOTE — TELEPHONE ENCOUNTER
Caller: Jacques Yeboah    Relationship: Self      Best call back number: [phone] OR LEAVE MESSAGE     What medications are you currently taking: simvastatin (ZOCOR) 20 MG tablet     What are your concerns: PATIENT STATES HER INSURANCE WILL START CHARGING HER FOR THE SIMVASTATIN   THEY TOLD HER THERE WOULD BE NO CHARGE FOR THE ATORVASTATIN IF SHE WANTED TO CHANGE MEDICATION   PLEASE ADVISE     PATIENT CALL BACK [phone]    [facility]                                       
PN and verbalized understanding.  She will call about 10-14 days before she runs out of the simvastatin and can send in atorvastatin.  
Patient states it will be the first of the year before they start charging her, but her appointment is not until February 7, 2022.  She will probably be out by then.  
They are very similar so if she wants to switch to Lipitor we can.  Does she want to just call us when she is out of the simvastatin and I will send in the Lipitor?  
no

## 2021-12-26 DIAGNOSIS — E78.2 MIXED HYPERLIPIDEMIA: Chronic | ICD-10-CM

## 2021-12-27 RX ORDER — SIMVASTATIN 20 MG
TABLET ORAL
Qty: 90 TABLET | Refills: 0 | Status: SHIPPED | OUTPATIENT
Start: 2021-12-27 | End: 2022-01-26

## 2022-01-25 ENCOUNTER — TELEPHONE (OUTPATIENT)
Dept: INTERNAL MEDICINE | Facility: CLINIC | Age: 77
End: 2022-01-25

## 2022-01-25 NOTE — TELEPHONE ENCOUNTER
Caller: Jacques Yeboah    Relationship to patient: Self    Best call back number: [phone]    Patient is needing: PATIENT REQUESTING TO SPEAK WITH JAIME IN REGARD TO A NEW MEDICATION. PATIENT STATES THEY DISCUSSED THIS AND JAIME WOULD REMEMBER

## 2022-01-26 RX ORDER — ATORVASTATIN CALCIUM 10 MG/1
10 TABLET, FILM COATED ORAL DAILY
Qty: 90 TABLET | Refills: 0 | Status: SHIPPED | OUTPATIENT
Start: 2022-01-26 | End: 2022-03-29

## 2022-01-26 NOTE — TELEPHONE ENCOUNTER
LMOVEVON for patient to call back.  She is probably calling about switching her cholesterol medicine to atorvastatin.  Which Dr. Ghotra can send to the pharmacy if she needs.

## 2022-01-26 NOTE — TELEPHONE ENCOUNTER
Patient states she is ready for the atorvastatin to be sent to [facility] for [facility] 90 day fill.  If she only gets a 30 will need to be sent to [facility].

## 2022-02-04 NOTE — PROGRESS NOTES
History of Present Illness     Here for medicare wellness exam. No hospitalizations in last year and pt feels health is stable. She feels like mental and [physical health is stable    PHQ-2-0-   Falls: none, balance not great but is very careful and does exercises. She is at [facility].   Pain - 0  Memory:no problems, stays active mentally  Pain Level:0  Living will: pt has and also would like MOST form filled out.  She brings 1 with her today and has thought about it and talked with her power of   Specialists:  Eye - Usama  Cards- Oscar  GI -Rey  Colorectal-ami  Cardiology-Oscar  Ortho-Carroll  Dermatology-brad     Exercise: walks regualrly and does balance exercises and core exercise class  Diet: gluten free. Does ca/vit D. Fairly healthy; milk w/ meals, likes dairy. Will drink boost some     She uses aleve occasionally     Hyperlipidemia-she is on lipitor just for a few days. Had been on zocor but insurance did not cover.     Celiac disease-she follows gluten free diet     MOST form - she would like to fill one out. She has thought a lot about it and talked w/ her health care surrogate,Shirley Camacho, about her wishes      Current Outpatient Medications on File Prior to Visit   Medication Sig Dispense Refill   • atorvastatin (Lipitor) 10 MG tablet Take 1 tablet by mouth Daily. 90 tablet 0   • amoxicillin (AMOXIL) 500 MG capsule 1 capsule. Takes 4 capsules before dental appointment.     • clindamycin (CLEOCIN) 300 MG capsule 2 capsules 1 (One) Time.       No current facility-administered medications on file prior to visit.       The following portions of the patient's history were reviewed and updated as appropriate: allergies, current medications, past family history, past medical history, past social history, past surgical history and problem list.    Review of Systems   Constitutional: Negative for activity change, appetite change, fever, unexpected weight gain and unexpected  weight loss.   HENT: Negative.    Eyes: Negative.    Respiratory: Negative for shortness of breath and wheezing.    Cardiovascular: Negative for chest pain, palpitations and leg swelling.   Gastrointestinal: Positive for constipation (uses miralax daily).   Endocrine: Negative.    Genitourinary: Negative for difficulty urinating and dysuria.   Skin: Negative.    Allergic/Immunologic: Negative for immunocompromised state.   Neurological: Negative for seizures, speech difficulty, memory problem and confusion.   Hematological: Does not bruise/bleed easily.   Psychiatric/Behavioral: Negative for agitation.         Objective    Vitals:    02/07/22 0824   BP: 124/82   Pulse: 71   Temp: 97.1 °F (36.2 °C)   SpO2: 100%     Physical Exam   Physical Exam  Vitals and nursing note reviewed.   Constitutional:       General: She is not in acute distress.     Appearance: She is well-developed. She is not diaphoretic.   HENT:      Head: Normocephalic and atraumatic.      Right Ear: External ear normal.      Left Ear: External ear normal.      Nose: Nose normal.      Mouth/Throat:      Pharynx: No oropharyngeal exudate.   Eyes:      General: No scleral icterus.        Right eye: No discharge.         Left eye: No discharge.      Conjunctiva/sclera: Conjunctivae normal.      Pupils: Pupils are equal, round, and reactive to light.   Neck:      Thyroid: No thyromegaly.   Cardiovascular:      Rate and Rhythm: Normal rate and regular rhythm.      Heart sounds: Normal heart sounds. No murmur heard.  No friction rub. No gallop.    Pulmonary:      Effort: Pulmonary effort is normal. No respiratory distress.      Breath sounds: Normal breath sounds. No wheezing or rales.   Chest:   Breasts:      Right: No mass, nipple discharge, skin change or tenderness.      Left: No mass, nipple discharge, skin change or tenderness.       Abdominal:      General: Bowel sounds are normal. There is no distension.      Palpations: Abdomen is soft. There is no  mass.      Tenderness: There is no abdominal tenderness. There is no guarding or rebound.   Musculoskeletal:         General: No deformity. Normal range of motion.      Cervical back: Normal range of motion and neck supple.   Lymphadenopathy:      Cervical: No cervical adenopathy.   Skin:     General: Skin is warm and dry.      Coloration: Skin is not pale.      Findings: No erythema or rash.   Neurological:      Mental Status: She is alert and oriented to person, place, and time.      Coordination: Coordination normal.      Deep Tendon Reflexes: Reflexes normal.   Psychiatric:         Behavior: Behavior normal.         Thought Content: Thought content normal.         Judgment: Judgment normal.            Assessment/Plan   Diagnoses and all orders for this visit:    1. Encounter for annual wellness exam in Medicare patient (Primary)  Regular exercise/healthy diet. BSE q month. Sunscreen use encouraged.  Miracle due 8/22  Colon due' 29   living will-patient has and is on file  Pneumovax-had  prevnar-had  DEXA due but she declines   shingles-had Shingrix  Does not need paps since age 64 or older and has had normal paps in past  Flu vaccine-she had  covid-booster will be due in April  MOST form reviewed with patient and signed.  We will review it again in a year.  She kept original and we copied it for chart as well    2. Celiac disease-patient follows gluten-free diet    3. Pure hypercholesterolemia-on Lipitor, just recently started this because of insurance reasons-check levels today  -     CBC (No Diff); Future  -     TSH Rfx On Abnormal To Free T4; Future  -     Lipid Panel; Future  -     Comprehensive Metabolic Panel; Future    4. Elevated glucose-check A1c  -     Hemoglobin A1c; Future    5. Need for hepatitis C screening test  -     Hepatitis C Antibody; Future

## 2022-02-07 ENCOUNTER — LAB (OUTPATIENT)
Dept: LAB | Facility: HOSPITAL | Age: 77
End: 2022-02-07

## 2022-02-07 ENCOUNTER — OFFICE VISIT (OUTPATIENT)
Dept: INTERNAL MEDICINE | Facility: CLINIC | Age: 77
End: 2022-02-07

## 2022-02-07 VITALS
HEIGHT: 64 IN | DIASTOLIC BLOOD PRESSURE: 82 MMHG | BODY MASS INDEX: 23.01 KG/M2 | SYSTOLIC BLOOD PRESSURE: 124 MMHG | TEMPERATURE: 97.1 F | OXYGEN SATURATION: 100 % | HEART RATE: 71 BPM | WEIGHT: 134.8 LBS

## 2022-02-07 DIAGNOSIS — K90.0 CELIAC DISEASE: ICD-10-CM

## 2022-02-07 DIAGNOSIS — Z00.00 ENCOUNTER FOR ANNUAL WELLNESS EXAM IN MEDICARE PATIENT: Primary | ICD-10-CM

## 2022-02-07 DIAGNOSIS — Z11.59 NEED FOR HEPATITIS C SCREENING TEST: ICD-10-CM

## 2022-02-07 DIAGNOSIS — Z78.9: ICD-10-CM

## 2022-02-07 DIAGNOSIS — E78.00 PURE HYPERCHOLESTEROLEMIA: ICD-10-CM

## 2022-02-07 DIAGNOSIS — R73.09 ELEVATED GLUCOSE: ICD-10-CM

## 2022-02-07 LAB
ALBUMIN SERPL-MCNC: 4.4 G/DL (ref 3.5–5.2)
ALBUMIN/GLOB SERPL: 1.6 G/DL
ALP SERPL-CCNC: 74 U/L (ref 39–117)
ALT SERPL W P-5'-P-CCNC: 20 U/L (ref 1–33)
ANION GAP SERPL CALCULATED.3IONS-SCNC: 8.1 MMOL/L (ref 5–15)
AST SERPL-CCNC: 21 U/L (ref 1–32)
BILIRUB SERPL-MCNC: 0.4 MG/DL (ref 0–1.2)
BUN SERPL-MCNC: 21 MG/DL (ref 8–23)
BUN/CREAT SERPL: 33.9 (ref 7–25)
CALCIUM SPEC-SCNC: 9.8 MG/DL (ref 8.6–10.5)
CHLORIDE SERPL-SCNC: 102 MMOL/L (ref 98–107)
CHOLEST SERPL-MCNC: 135 MG/DL (ref 0–200)
CO2 SERPL-SCNC: 28.9 MMOL/L (ref 22–29)
CREAT SERPL-MCNC: 0.62 MG/DL (ref 0.57–1)
DEPRECATED RDW RBC AUTO: 41.5 FL (ref 37–54)
ERYTHROCYTE [DISTWIDTH] IN BLOOD BY AUTOMATED COUNT: 12.1 % (ref 12.3–15.4)
GFR SERPL CREATININE-BSD FRML MDRD: 93 ML/MIN/1.73
GLOBULIN UR ELPH-MCNC: 2.7 GM/DL
GLUCOSE SERPL-MCNC: 101 MG/DL (ref 65–99)
HBA1C MFR BLD: 6.24 % (ref 4.8–5.6)
HCT VFR BLD AUTO: 42.8 % (ref 34–46.6)
HCV AB SER DONR QL: NORMAL
HDLC SERPL-MCNC: 61 MG/DL (ref 40–60)
HGB BLD-MCNC: 14 G/DL (ref 12–15.9)
LDLC SERPL CALC-MCNC: 61 MG/DL (ref 0–100)
LDLC/HDLC SERPL: 1.01 {RATIO}
MCH RBC QN AUTO: 30.7 PG (ref 26.6–33)
MCHC RBC AUTO-ENTMCNC: 32.7 G/DL (ref 31.5–35.7)
MCV RBC AUTO: 93.9 FL (ref 79–97)
PLATELET # BLD AUTO: 245 10*3/MM3 (ref 140–450)
PMV BLD AUTO: 10.5 FL (ref 6–12)
POTASSIUM SERPL-SCNC: 4.5 MMOL/L (ref 3.5–5.2)
PROT SERPL-MCNC: 7.1 G/DL (ref 6–8.5)
RBC # BLD AUTO: 4.56 10*6/MM3 (ref 3.77–5.28)
SODIUM SERPL-SCNC: 139 MMOL/L (ref 136–145)
TRIGL SERPL-MCNC: 62 MG/DL (ref 0–150)
TSH SERPL DL<=0.05 MIU/L-ACNC: 1.83 UIU/ML (ref 0.27–4.2)
VLDLC SERPL-MCNC: 13 MG/DL (ref 5–40)
WBC NRBC COR # BLD: 3.95 10*3/MM3 (ref 3.4–10.8)

## 2022-02-07 PROCEDURE — 1126F AMNT PAIN NOTED NONE PRSNT: CPT | Performed by: INTERNAL MEDICINE

## 2022-02-07 PROCEDURE — 83036 HEMOGLOBIN GLYCOSYLATED A1C: CPT | Performed by: INTERNAL MEDICINE

## 2022-02-07 PROCEDURE — 85027 COMPLETE CBC AUTOMATED: CPT | Performed by: INTERNAL MEDICINE

## 2022-02-07 PROCEDURE — G0439 PPPS, SUBSEQ VISIT: HCPCS | Performed by: INTERNAL MEDICINE

## 2022-02-07 PROCEDURE — 1170F FXNL STATUS ASSESSED: CPT | Performed by: INTERNAL MEDICINE

## 2022-02-07 PROCEDURE — 80053 COMPREHEN METABOLIC PANEL: CPT | Performed by: INTERNAL MEDICINE

## 2022-02-07 PROCEDURE — 1160F RVW MEDS BY RX/DR IN RCRD: CPT | Performed by: INTERNAL MEDICINE

## 2022-02-07 PROCEDURE — 80061 LIPID PANEL: CPT | Performed by: INTERNAL MEDICINE

## 2022-02-07 PROCEDURE — 86803 HEPATITIS C AB TEST: CPT | Performed by: INTERNAL MEDICINE

## 2022-02-07 PROCEDURE — 84443 ASSAY THYROID STIM HORMONE: CPT | Performed by: INTERNAL MEDICINE

## 2022-02-07 RX ORDER — CLINDAMYCIN HYDROCHLORIDE 300 MG/1
2 CAPSULE ORAL ONCE
COMMUNITY
Start: 2022-02-05 | End: 2022-08-21

## 2022-02-17 ENCOUNTER — TELEPHONE (OUTPATIENT)
Dept: INTERNAL MEDICINE | Facility: CLINIC | Age: 77
End: 2022-02-17

## 2022-02-17 NOTE — TELEPHONE ENCOUNTER
Caller: Jacques Yeboah    Relationship: Self    Best call back number: [phone]    What form or medical record are you requesting: LAB RESULTS     Who is requesting this form or medical record from you: PATIENT    How would you like to receive the form or medical records (pick-up, mail, fax): MAIL   If fax, what is the fax number:   If mail, what is the address:    [address] [address]    If pick-up, provide patient with address and location details    Timeframe paperwork needed: ASAP    Additional notes: PATIENT IS ASKING FOR A CALL BACK TO ASK QUESTIONS ABOUT HER LAB RESULTS

## 2022-02-21 NOTE — TELEPHONE ENCOUNTER
Spoke with patient and discussed her concerns about her labs.  She would also like a copy mailed to her which I have put in the mail today.

## 2022-03-29 RX ORDER — ATORVASTATIN CALCIUM 10 MG/1
10 TABLET, FILM COATED ORAL DAILY
Qty: 90 TABLET | Refills: 0 | Status: SHIPPED | OUTPATIENT
Start: 2022-03-29 | End: 2022-04-26 | Stop reason: SDUPTHER

## 2022-03-29 NOTE — TELEPHONE ENCOUNTER
Rx Refill Note  Requested Prescriptions     Pending Prescriptions Disp Refills   • atorvastatin (LIPITOR) 10 MG tablet [Pharmacy Med Name: Atorvastatin Calcium 10 MG Oral Tablet] 90 tablet 3     Sig: TAKE 1 TABLET BY MOUTH  DAILY      Last filled:  Last office visit with prescribing clinician: 2/7/2022      Next office visit with prescribing clinician: 2/20/2023 April JASPER Brown MA  03/29/22, 07:36 EDT

## 2022-04-26 RX ORDER — ATORVASTATIN CALCIUM 10 MG/1
10 TABLET, FILM COATED ORAL DAILY
Qty: 90 TABLET | Refills: 0 | Status: SHIPPED | OUTPATIENT
Start: 2022-04-26 | End: 2022-07-25 | Stop reason: SDUPTHER

## 2022-04-26 NOTE — TELEPHONE ENCOUNTER
Caller: Shirin Yeboahálvaro DE LEON    Relationship: Self    Best call back number: [phone]    Requested Prescriptions:   Requested Prescriptions     Pending Prescriptions Disp Refills   • atorvastatin (LIPITOR) 10 MG tablet 90 tablet 0     Sig: Take 1 tablet by mouth Daily.        Pharmacy where request should be sent: [facility] - [address], [address] - [phone] ph - [phone] FX     Additional details provided by patient: PATIENT HAS 11 TABLETS LEFT BUT IT COMES FROM A MAIL ORDER PHARMACY AND SHE IS ALSO GOING OUT OF TOWN THIS WEEKEND     Does the patient have less than a 3 day supply:  [] Yes  [] No    Todd Wilder Rep   04/26/22 13:33 EDT

## 2022-04-27 ENCOUNTER — TELEPHONE (OUTPATIENT)
Dept: INTERNAL MEDICINE | Facility: CLINIC | Age: 77
End: 2022-04-27

## 2022-04-27 NOTE — TELEPHONE ENCOUNTER
----- Message from Jacques Yeboah sent at 4/26/2022  5:32 PM EDT -----  Regarding: Prescription refill   Need a  90 day prescription with several refills called in to [facility]. This is for Atorvastatin 10 mg . My next appointment is Feb 20th. I am doing great. Thank you very much. I would like to know when this is called in.

## 2022-04-27 NOTE — TELEPHONE ENCOUNTER
LV: 2/7/22  NV: 2/20/23  LF: 3/29/22 before I sent in 90 days yesterday.  LL: 2/7/22    She is wanting refills put on script.  Does she come in once a year?

## 2022-05-04 ENCOUNTER — OFFICE VISIT (OUTPATIENT)
Dept: INTERNAL MEDICINE | Facility: CLINIC | Age: 77
End: 2022-05-04

## 2022-05-04 VITALS
HEART RATE: 82 BPM | TEMPERATURE: 97.9 F | SYSTOLIC BLOOD PRESSURE: 118 MMHG | OXYGEN SATURATION: 98 % | RESPIRATION RATE: 16 BRPM | DIASTOLIC BLOOD PRESSURE: 72 MMHG

## 2022-05-04 DIAGNOSIS — J06.9 UPPER RESPIRATORY TRACT INFECTION, UNSPECIFIED TYPE: Primary | ICD-10-CM

## 2022-05-04 LAB
EXPIRATION DATE: NORMAL
FLUAV AG UPPER RESP QL IA.RAPID: NOT DETECTED
FLUBV AG UPPER RESP QL IA.RAPID: NOT DETECTED
INTERNAL CONTROL: NORMAL
Lab: NORMAL
SARS-COV-2 RNA RESP QL NAA+PROBE: NOT DETECTED

## 2022-05-04 PROCEDURE — 99213 OFFICE O/P EST LOW 20 MIN: CPT | Performed by: STUDENT IN AN ORGANIZED HEALTH CARE EDUCATION/TRAINING PROGRAM

## 2022-05-04 PROCEDURE — 87428 SARSCOV & INF VIR A&B AG IA: CPT | Performed by: STUDENT IN AN ORGANIZED HEALTH CARE EDUCATION/TRAINING PROGRAM

## 2022-05-04 RX ORDER — AZITHROMYCIN 250 MG/1
TABLET, FILM COATED ORAL
Qty: 6 TABLET | Refills: 0 | OUTPATIENT
Start: 2022-05-04 | End: 2022-08-21

## 2022-05-04 RX ORDER — BROMPHENIRAMINE MALEATE, PSEUDOEPHEDRINE HYDROCHLORIDE, AND DEXTROMETHORPHAN HYDROBROMIDE 2; 30; 10 MG/5ML; MG/5ML; MG/5ML
5 SYRUP ORAL 4 TIMES DAILY PRN
Qty: 118 ML | Refills: 0 | OUTPATIENT
Start: 2022-05-04 | End: 2022-08-21

## 2022-05-04 NOTE — PROGRESS NOTES
Chief Complaint  Cough (Headache, weakness, started Monday, Home COVID test is negative, hx of bronchitis)    Jacques Yeboah presents to [facility]      Subjective     2 day history of cough, congestion, sore throat. No recent travel or known sick contacts. At home COVID test was negative. Taking tylenol as needed. Denies SOB or chest pain.     The following portions of the patient's history were reviewed and updated as appropriate: allergies, current medications, past family history, past medical history, past social history, past surgical history and problem list.      Health Maintenance   Topic Date Due   • TDAP/TD VACCINES (1 - Tdap) Never done   • COVID-19 Vaccine (3 - Booster for Pfizer series) 03/27/2022   • INFLUENZA VACCINE  08/01/2022   • HEMOGLOBIN A1C  08/07/2022   • DIABETIC EYE EXAM  09/09/2022   • ANNUAL WELLNESS VISIT  02/07/2023   • LIPID PANEL  02/07/2023   • HEPATITIS C SCREENING  Completed   • Pneumococcal Vaccine 65+  Completed   • ZOSTER VACCINE  Completed   • COLORECTAL CANCER SCREENING  Completed   • URINE MICROALBUMIN  Discontinued   • DXA SCAN  Discontinued         Procedures       Past Medical History:   Diagnosis Date   • Arthritis    • Celiac disease    • H/O bone density study    • Hip pain    • History of colonoscopy     CSGA   • History of mammogram 08/13/2020   • Inflammatory arthritis    • Lumbago    • Lumbar degenerative disc disease    • Metatarsalgia, left foot    • Osteoarthritis of knees, bilateral    • Pelvic fracture (HCC)    • Primary osteoarthritis of both hips    • Sciatica    • Thoracic spondylosis       No Known Allergies   Social History     Tobacco Use   • Smoking status: Never Smoker   • Smokeless tobacco: Never Used   Vaping Use   • Vaping Use: Never used   Substance Use Topics   • Alcohol use: No   • Drug use: No     Past Surgical History:   Procedure Laterality Date   • COLONOSCOPY      CSGA   • EYE SURGERY     • FRACTURE SURGERY       left wrist; pelvis   • TONSILLECTOMY     • TOTAL HIP ARTHROPLASTY Right 10/3/2017    Procedure: TOTAL HIP ARTHROPLASTY RIGHT;  Surgeon: Prem Hodges MD;  Location: [facility];  Service:    • TUBAL ABDOMINAL LIGATION        Family History   Problem Relation Age of Onset   • Heart failure Mother         CHF   • Osteoarthritis Mother    • Heart attack Father         Acute MI   • Stroke Father         Stroke syndrome   • Breast cancer Paternal Aunt         MID 70'S   • Ovarian cancer Neg Hx          Current Outpatient Medications:   •  atorvastatin (LIPITOR) 10 MG tablet, Take 1 tablet by mouth Daily., Disp: 90 tablet, Rfl: 0  •  amoxicillin (AMOXIL) 500 MG capsule, 1 capsule. Takes 4 capsules before dental appointment., Disp: , Rfl:   •  azithromycin (Zithromax Z-Romel) 250 MG tablet, Take 2 tablets by mouth on day 1, then 1 tablet daily on days 2-5, Disp: 6 tablet, Rfl: 0  •  brompheniramine-pseudoephedrine-DM 30-2-10 MG/5ML syrup, Take 5 mL by mouth 4 (Four) Times a Day As Needed for Congestion, Cough or Allergies., Disp: 118 mL, Rfl: 0  •  clindamycin (CLEOCIN) 300 MG capsule, 2 capsules 1 (One) Time., Disp: , Rfl:     Objective   Vital Signs  /72   Pulse 82   Temp 97.9 °F (36.6 °C) (Infrared)   Resp 16   SpO2 98%   There is no height or weight on file to calculate BMI.     Physical Exam  Constitutional:       General: She is not in acute distress.     Appearance: She is not toxic-appearing.   HENT:      Nose: Congestion present.      Mouth/Throat:      Pharynx: Posterior oropharyngeal erythema present. No oropharyngeal exudate.   Eyes:      Pupils: Pupils are equal, round, and reactive to light.   Cardiovascular:      Rate and Rhythm: Normal rate and regular rhythm.   Pulmonary:      Effort: Pulmonary effort is normal.      Breath sounds: Normal breath sounds.          Assessment and Plan  Diagnoses and all orders for this visit:    1. Upper respiratory tract infection, unspecified type (Primary)  -      Covid-19 + Flu A&B AG, Veritor  -     Cancel: POCT rapid strep A  -     brompheniramine-pseudoephedrine-DM 30-2-10 MG/5ML syrup; Take 5 mL by mouth 4 (Four) Times a Day As Needed for Congestion, Cough or Allergies.  Dispense: 118 mL; Refill: 0  -     azithromycin (Zithromax Z-Romel) 250 MG tablet; Take 2 tablets by mouth on day 1, then 1 tablet daily on days 2-5  Dispense: 6 tablet; Refill: 0       COVID and flu negative   Advised conservative measures: tylenol, decongestants, cough syrup, good hydration  Abx sent and to use if symptoms do not improve by the end of the week   ED precautions discussed         Follow Up    Return for Next scheduled follow up.    Patient was given instructions and counseling regarding her condition or for health maintenance advice. Please see specific information pulled into the AVS if appropriate.    Electronically signed by:   Ion Francis MD  05/04/2022

## 2022-06-27 RX ORDER — ATORVASTATIN CALCIUM 10 MG/1
10 TABLET, FILM COATED ORAL DAILY
Qty: 90 TABLET | Refills: 3 | OUTPATIENT
Start: 2022-06-27

## 2022-06-27 NOTE — TELEPHONE ENCOUNTER
Rx Refill Note  Requested Prescriptions     Pending Prescriptions Disp Refills   • atorvastatin (LIPITOR) 10 MG tablet [Pharmacy Med Name: Atorvastatin Calcium 10 MG Oral Tablet] 90 tablet 3     Sig: TAKE 1 TABLET BY MOUTH  DAILY      Last filled: 04/26/2022  Last office visit with prescribing clinician: 2/7/2022      Next office visit with prescribing clinician: 2/20/2023 April JASPER Brown MA  06/27/22, 12:41 EDT     Patient has enough medication to last until Dr. Ghotra returns.

## 2022-07-07 ENCOUNTER — TRANSCRIBE ORDERS (OUTPATIENT)
Dept: ADMINISTRATIVE | Facility: HOSPITAL | Age: 77
End: 2022-07-07

## 2022-07-07 DIAGNOSIS — Z12.31 VISIT FOR SCREENING MAMMOGRAM: Primary | ICD-10-CM

## 2022-07-25 RX ORDER — ATORVASTATIN CALCIUM 10 MG/1
10 TABLET, FILM COATED ORAL DAILY
Qty: 90 TABLET | Refills: 1 | Status: SHIPPED | OUTPATIENT
Start: 2022-07-25 | End: 2023-02-01 | Stop reason: SDUPTHER

## 2022-07-25 NOTE — TELEPHONE ENCOUNTER
Caller: Obinna Muralihoward DE LEON    Relationship: Self    Best call back number: [phone]    Requested Prescriptions:   Requested Prescriptions     Pending Prescriptions Disp Refills   • atorvastatin (LIPITOR) 10 MG tablet 90 tablet 0     Sig: Take 1 tablet by mouth Daily.        Pharmacy where request should be sent:      [facility]  ([facility]) - [facility] [address] [facility] [phone] [facility] [phone] FX        Additional details provided by patient: REQUESTING TO BE CALLED WHEN REFILL HAS BE ORDERED    Does the patient have less than a 3 day supply:  [x] Yes  [] No    Todd Harvey Rep   07/25/22 09:48 EDT

## 2022-08-21 ENCOUNTER — APPOINTMENT (OUTPATIENT)
Dept: GENERAL RADIOLOGY | Facility: HOSPITAL | Age: 77
End: 2022-08-21

## 2022-08-21 ENCOUNTER — HOSPITAL ENCOUNTER (EMERGENCY)
Facility: HOSPITAL | Age: 77
Discharge: HOME | End: 2022-08-21
Attending: EMERGENCY MEDICINE | Admitting: EMERGENCY MEDICINE

## 2022-08-21 ENCOUNTER — APPOINTMENT (OUTPATIENT)
Dept: CT IMAGING | Facility: HOSPITAL | Age: 77
End: 2022-08-21

## 2022-08-21 VITALS
HEART RATE: 76 BPM | WEIGHT: 133 LBS | SYSTOLIC BLOOD PRESSURE: 154 MMHG | DIASTOLIC BLOOD PRESSURE: 73 MMHG | TEMPERATURE: 97.7 F | HEIGHT: 65 IN | BODY MASS INDEX: 22.16 KG/M2 | OXYGEN SATURATION: 96 % | RESPIRATION RATE: 18 BRPM

## 2022-08-21 DIAGNOSIS — S80.01XA CONTUSION OF RIGHT KNEE, INITIAL ENCOUNTER: ICD-10-CM

## 2022-08-21 DIAGNOSIS — S09.90XA INJURY OF HEAD, INITIAL ENCOUNTER: ICD-10-CM

## 2022-08-21 DIAGNOSIS — S62.91XA FRACTURED HAND, RIGHT, CLOSED, INITIAL ENCOUNTER: ICD-10-CM
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
## 2022-08-21 DIAGNOSIS — S16.1XXA ACUTE CERVICAL MYOFASCIAL STRAIN, INITIAL ENCOUNTER: ICD-10-CM

## 2022-08-21 PROCEDURE — 90471 IMMUNIZATION ADMIN: CPT | Performed by: PHYSICIAN ASSISTANT

## 2022-08-21 PROCEDURE — 25010000002 TETANUS-DIPHTH-ACELL PERTUSSIS 5-2.5-18.5 LF-MCG/0.5 SUSPENSION PREFILLED SYRINGE: Performed by: PHYSICIAN ASSISTANT

## 2022-08-21 PROCEDURE — 72125 CT NECK SPINE W/O DYE: CPT

## 2022-08-21 PROCEDURE — 90715 TDAP VACCINE 7 YRS/> IM: CPT | Performed by: PHYSICIAN ASSISTANT

## 2022-08-21 PROCEDURE — 73560 X-RAY EXAM OF KNEE 1 OR 2: CPT

## 2022-08-21 PROCEDURE — 73130 X-RAY EXAM OF HAND: CPT

## 2022-08-21 PROCEDURE — 99283 EMERGENCY DEPT VISIT LOW MDM: CPT

## 2022-08-21 PROCEDURE — 70486 CT MAXILLOFACIAL W/O DYE: CPT

## 2022-08-21 PROCEDURE — 70450 CT HEAD/BRAIN W/O DYE: CPT

## 2022-08-21 RX ORDER — BACITRACIN, NEOMYCIN, POLYMYXIN B 400; 3.5; 5 [USP'U]/G; MG/G; [USP'U]/G
1 OINTMENT TOPICAL ONCE
Status: COMPLETED | OUTPATIENT
Start: 2022-08-21 | End: 2022-08-21

## 2022-08-21 RX ADMIN — TETANUS TOXOID, REDUCED DIPHTHERIA TOXOID AND ACELLULAR PERTUSSIS VACCINE, ADSORBED 0.5 ML: 5; 2.5; 8; 8; 2.5 SUSPENSION INTRAMUSCULAR at 17:40

## 2022-08-21 RX ADMIN — BACITRACIN, NEOMYCIN, POLYMYXIN B 1 APPLICATION: 400; 3.5; 5 OINTMENT TOPICAL at 18:20

## 2022-08-23 ENCOUNTER — OFFICE VISIT (OUTPATIENT)
Dept: ORTHOPEDIC SURGERY | Facility: CLINIC | Age: 77
End: 2022-08-23

## 2022-08-23 VITALS
SYSTOLIC BLOOD PRESSURE: 108 MMHG | DIASTOLIC BLOOD PRESSURE: 64 MMHG | WEIGHT: 133 LBS | BODY MASS INDEX: 22.16 KG/M2 | HEIGHT: 65 IN

## 2022-08-23 DIAGNOSIS — M79.641 RIGHT HAND PAIN: Primary | ICD-10-CM
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
## 2022-08-23 DIAGNOSIS — S80.01XA CONTUSION OF RIGHT KNEE, INITIAL ENCOUNTER: ICD-10-CM

## 2022-08-23 DIAGNOSIS — M25.561 RIGHT KNEE PAIN, UNSPECIFIED CHRONICITY: ICD-10-CM

## 2022-08-23 DIAGNOSIS — S62.346A CLOSED NONDISPLACED FRACTURE OF BASE OF FIFTH METACARPAL BONE OF RIGHT HAND, INITIAL ENCOUNTER: ICD-10-CM

## 2022-08-23 PROCEDURE — 99214 OFFICE O/P EST MOD 30 MIN: CPT | Performed by: PHYSICIAN ASSISTANT

## 2022-08-23 NOTE — PROGRESS NOTES
[facility] [place] Note    Subjective     Chief Complaint   Patient presents with   • Right Hand - Pain   DOI.  8/21/2022      HPI  Jacques Yeboah is a 77 y.o. female.  Right-hand-dominant.  Established patient presents for evaluation of right hand pain.  MARIAN: Seen in ED after she tripped and fell resulting in injury to her face as well as her hand.  The ED she was noted to have 1/5 metacarpal fracture and placed in a splint.  The patient was referred to orthopedics for further evaluation and treatment.    Pain scale: 1/10.  Severity of the pain mild to moderate at times.  Quality of the pain dull, aching.  Associated symptoms swelling, popping, grinding, stiffness, giving away/buckling.  Activity related to pain movement.  Pain eased by resting.  No reported numbness or tingling.      Denies fever, chills, night sweats or other constitutional symptoms.      Past Medical History:   Diagnosis Date   • Arthritis    • Celiac disease    • H/O bone density study    • Hip pain    • History of colonoscopy     CSGA   • History of mammogram 08/13/2020   • Inflammatory arthritis    • Lumbago    • Lumbar degenerative disc disease    • Metatarsalgia, left foot    • Osteoarthritis of knees, bilateral    • Pelvic fracture (HCC)    • Primary osteoarthritis of both hips    • Sciatica    • Thoracic spondylosis       Past Surgical History:   Procedure Laterality Date   • COLONOSCOPY      CSGA   • EYE SURGERY     • FRACTURE SURGERY      left wrist; pelvis   • TONSILLECTOMY     • TOTAL HIP ARTHROPLASTY Right 10/3/2017    Procedure: TOTAL HIP ARTHROPLASTY RIGHT;  Surgeon: Prem Hodges MD;  Location: [facility];  Service:    • TUBAL ABDOMINAL LIGATION        Family History   Problem Relation Age of Onset   • Heart failure Mother         CHF   • Osteoarthritis Mother    • Heart attack Father         Acute MI   • Stroke Father         Stroke syndrome   • Breast cancer Paternal Aunt         MID 70'S   • Ovarian  "cancer Neg Hx      Social History     Socioeconomic History   • Marital status:    Tobacco Use   • Smoking status: Never Smoker   • Smokeless tobacco: Never Used   Vaping Use   • Vaping Use: Never used   Substance and Sexual Activity   • Alcohol use: No   • Drug use: No   • Sexual activity: Defer      Current Outpatient Medications on File Prior to Visit   Medication Sig Dispense Refill   • atorvastatin (LIPITOR) 10 MG tablet Take 1 tablet by mouth Daily. 90 tablet 1     No current facility-administered medications on file prior to visit.      No Known Allergies     The following portions of the patient's history were reviewed and updated as appropriate: allergies, current medications, past family history, past medical history, past social history, past surgical history and problem list.    Review of Systems   Constitutional: Negative.    HENT: Negative.    Eyes: Negative.    Respiratory: Negative.    Cardiovascular: Negative.    Gastrointestinal: Negative.    Endocrine: Negative.    Genitourinary: Negative.    Musculoskeletal: Positive for arthralgias.   Skin: Negative.    Allergic/Immunologic: Negative.    Neurological: Negative.    Hematological: Negative.    Psychiatric/Behavioral: Negative.         Objective      Physical Exam  /64   Ht 165.1 cm (65\")   Wt 60.3 kg (133 lb)   LMP  (LMP Unknown)   BMI 22.13 kg/m²     Body mass index is 22.13 kg/m².    GENERAL APPEARANCE: awake, alert & oriented x 3, in no acute distress and well developed, well nourished  PSYCH: normal mood and affect  LUNGS:  breathing nonlabored, no wheezing  EYES: sclera anicteric, pupils equal  CARDIOVASCULAR: palpable pulses. Capillary refill less than 2 seconds  INTEGUMENTARY: skin intact, no clubbing, cyanosis  NEUROLOGIC:  Normal Sensation         Ortho Exam  Right hand  Skin: Grossly intact without any redness, warmth or swelling.  Positive bruising noted in the palm.  Tenderness: Positive tenderness to the lateral " aspect of the hand especially at level of the fifth metacarpal where the known fracture is located.  Alignment, rotation appears satisfactory no evidence of scissoring or rotation quality.  Motor: Grossly intact R/U/M/AIN/PIN  Sensory: Grossly intact LT: R/U/M  Vascular: Brisk capillary refill, 2+ radial pulse.    Right knee  Skin: Healing abrasions noted to the anterior aspect of the knee.  Otherwise no redness, warmth or soft tissue swelling.  Tenderness: Positive anterior aspect of the knee.  Motion: 0-120 degrees.  Varus and valgus stress: Negative, without laxity.  Straight leg raise: Intact.  Motor/sensory: Grossly intact L2-S1.      Imaging/Studies  Reviewed plain film imaging of patient's right hand and right knee from 8/21/2022.  XR HAND 3+ VW RIGHT-     Date of Exam: 8/21/2022 4:57 PM     Indication: fall pain over 5th metacarpal.     Comparison: None available.      Technique: 3 views of the hand were obtained.     FINDINGS:  There is a nondisplaced fracture of the base of the fifth  metacarpal. No additional fracture identified. No evidence of  dislocation.  No soft tissue swelling.  Bone mineral density is normal.  Central erosions present within the second through fifth DIP joints,  most pronounced within the second DIP joint. No osseous erosions.     IMPRESSION:  Nondisplaced fracture of the base of the fifth metacarpal.     This report was finalized on 8/21/2022 5:35 PM by Suzy Velázquez MD.    Examination: XR KNEE 1 OR 2 VW RIGHT-     Date of Exam: 8/21/2022 4:57 PM     Indication: fall pain,.     Comparison: None available.     Technique: Three radiographic views of the knee were obtained.     Findings:  There is no acute fracture or dislocation. No sizable joint effusion  identified. No erosions. No evidence of periostitis. Soft tissues are  unremarkable.  Mild to moderate tricompartmental degenerative changes  are present, most pronounced within the medial compartment.     IMPRESSION:  No acute  osseous abnormality.      This report was finalized on 8/21/2022 5:34 PM by Suzy Velázquez MD.  Assessment/Plan        ICD-10-CM ICD-9-CM   1. Right hand pain  M79.641 729.5   2. Closed nondisplaced fracture of base of fifth metacarpal bone of right hand, initial encounter  S62.346A 815.02   3. Right knee pain, unspecified chronicity  M25.561 719.46   4. Contusion of right knee, initial encounter  S80.01XA 924.11   5. Accidental fall, initial encounter  W19.XXXA E888.9       No orders of the defined types were placed in this encounter.       -Right hand pain due to nondisplaced fracture base of fifth metacarpal secondary to fall.  -Right knee pain due to contusion secondary to fall.  -Placed into a cock-up wrist splint for her hand fracture.  -To remain nonweightbearing to the right hand.  -Recommend OTC NSAIDS/pain medication as needed.  -Follow up in 3 weeks for repeat evaluation, to include pre-clinic imaging of her right hand.  -Questions and concerns answered.    History, exam and imaging discussed with Dr. Ames, who agrees with the above assessment and plan.      Medical Decision Making  Management Options : over-the-counter medicine and close treatment of fracture or dislocation  Data/Risk: radiology tests       Francine Vicente PA-C  08/23/22  22:32 EDT               EMR Dragon/Transcription disclaimer:  Much of this encounter note is an electronic transcription of spoken language to printed text. Electronic transcription of spoken language may permit erroneous, or at times, nonsensical words or phrases to be inadvertently transcribed. Although I have reviewed the note for such errors, some may still exist.

## 2022-09-09 ENCOUNTER — HOSPITAL ENCOUNTER (OUTPATIENT)
Dept: MAMMOGRAPHY | Facility: HOSPITAL | Age: 77
Discharge: HOME | End: 2022-09-09
Admitting: INTERNAL MEDICINE

## 2022-09-09 DIAGNOSIS — Z12.31 VISIT FOR SCREENING MAMMOGRAM: ICD-10-CM

## 2022-09-09 PROCEDURE — 77067 SCR MAMMO BI INCL CAD: CPT

## 2022-09-09 PROCEDURE — 77063 BREAST TOMOSYNTHESIS BI: CPT | Performed by: RADIOLOGY

## 2022-09-09 PROCEDURE — 77063 BREAST TOMOSYNTHESIS BI: CPT

## 2022-09-09 PROCEDURE — 77067 SCR MAMMO BI INCL CAD: CPT | Performed by: RADIOLOGY

## 2022-09-13 ENCOUNTER — OFFICE VISIT (OUTPATIENT)
Dept: ORTHOPEDIC SURGERY | Facility: CLINIC | Age: 77
End: 2022-09-13

## 2022-09-13 VITALS
DIASTOLIC BLOOD PRESSURE: 72 MMHG | SYSTOLIC BLOOD PRESSURE: 117 MMHG | BODY MASS INDEX: 22.16 KG/M2 | HEIGHT: 65 IN | WEIGHT: 133 LBS

## 2022-09-13 DIAGNOSIS — S62.346D CLOSED NONDISPLACED FRACTURE OF BASE OF FIFTH METACARPAL BONE OF RIGHT HAND WITH ROUTINE HEALING, SUBSEQUENT ENCOUNTER: Primary | ICD-10-CM

## 2022-09-13 PROCEDURE — 99213 OFFICE O/P EST LOW 20 MIN: CPT | Performed by: PHYSICIAN ASSISTANT

## 2022-09-13 NOTE — PROGRESS NOTES
"    [facility] [place] [facility] Note        Subjective     CC: Follow-up (3 week follow up nondisplaced fracture base of fifth metacarpal secondary to fall 8/21/22)      DAVID Yeboah is a 77 y.o. female.  Patient returns today for follow-up evaluation right hand fifth metacarpal base fracture.  Treated nonoperatively with cock-up wrist splint.    Pain scale: 1/10.  No reported numbness or tingling.    Overall, patient's symptoms are improved.    ROS:    Constiutional:Pt denies fever, chills, nausea, or vomiting.  MSK:as above        Objective      Past Medical History  Past Medical History:   Diagnosis Date   • Arthritis    • Celiac disease    • H/O bone density study    • Hip pain    • History of colonoscopy     CSGA   • History of mammogram 08/13/2020   • Inflammatory arthritis    • Lumbago    • Lumbar degenerative disc disease    • Metatarsalgia, left foot    • Osteoarthritis of knees, bilateral    • Pelvic fracture (HCC)    • Primary osteoarthritis of both hips    • Sciatica    • Thoracic spondylosis          Physical Exam  /72   Ht 165.1 cm (65\")   Wt 60.3 kg (133 lb)   LMP  (LMP Unknown)   BMI 22.13 kg/m²     Body mass index is 22.13 kg/m².    Patient is well nourished and well developed.        Ortho Exam  Right hand  Skin: Grossly intact without any redness, warmth or swelling.    Previously noted bruising is resolved.  Tenderness:  Only mild discomfort noted base of the fifth metacarpal.  Alignment, rotation appears satisfactory no evidence of scissoring or rotation quality.  Motor/sensory: Grossly intact C5-T1.  Vascular: Brisk capillary refill, 2+ radial pulse.      Imaging/Labs/EMG Reviewed:  Ordered right hand plain films.  Imaging read/interpreted by Dr. Ames.    Right Hand X-Ray     Indication: Pain     Views:  AP, Lateral, and Oblique      Comparison: Right hand 8/21/2022     Findings:  Nondisplaced fracture at the base of the fifth metacarpal is healed " radiographically.  No bony lesion  Normal soft tissues  Severe degenerative changes noted at the DIP joints throughout the hand.  Severe thumb MCP arthritis     Impression:   Severe degenerative changes DIP joints right hand and right thumb MCP with healed fifth metacarpal shaft fracture      Assessment:  1. Closed nondisplaced fracture of base of fifth metacarpal bone of right hand with routine healing, subsequent encounter        Plan:  1. Nondisplaced fracture base of right fifth metacarpal, stable and healed.  2. Patient may wean out of cock-up wrist splint.  3. Encourage range of motion of the wrist and digits.  4. Recommend OTC NSAIDs/pain medication as needed.  5. Follow up in 3-4 weeks for repeat evaluation.  6. Questions and concerns answered.      Francine Vicente PA-C  09/16/22  13:08 EDT      Dictated Utilizing Dragon Dictation.

## 2022-10-10 ENCOUNTER — TELEPHONE (OUTPATIENT)
Dept: INTERNAL MEDICINE | Facility: CLINIC | Age: 77
End: 2022-10-10

## 2022-10-10 NOTE — TELEPHONE ENCOUNTER
Patient called with questions regarding her booster shots. (Patient did not share her questions with me) She requested a call back from Ita trejo if possible.

## 2022-10-10 NOTE — TELEPHONE ENCOUNTER
Patient did get her high dose flu shot.  She was wondering about the covid booster.  I let her know she can just get the bivalent covid booster 2 weeks after getting the flu vaccine.

## 2022-10-17 ENCOUNTER — OFFICE VISIT (OUTPATIENT)
Dept: ORTHOPEDIC SURGERY | Facility: CLINIC | Age: 77
End: 2022-10-17

## 2022-10-17 VITALS
DIASTOLIC BLOOD PRESSURE: 88 MMHG | SYSTOLIC BLOOD PRESSURE: 122 MMHG | BODY MASS INDEX: 22.33 KG/M2 | WEIGHT: 134 LBS | HEIGHT: 65 IN

## 2022-10-17 DIAGNOSIS — S62.346D CLOSED NONDISPLACED FRACTURE OF BASE OF FIFTH METACARPAL BONE OF RIGHT HAND WITH ROUTINE HEALING, SUBSEQUENT ENCOUNTER: Primary | ICD-10-CM

## 2022-10-17 PROCEDURE — 99213 OFFICE O/P EST LOW 20 MIN: CPT | Performed by: PHYSICIAN ASSISTANT

## 2022-10-17 NOTE — PROGRESS NOTES
"    [facility] [place] [facility] Note        Subjective     Follow-up (5 week recheck -nondisplaced fracture base of fifth metacarpal secondary to fall 8/21/22)       DAVID Yeboah is a 77 y.o. female.  Patient returns today for follow-up right hand fifth metacarpal fracture.  Last appointment the bone seem to be well-healed.  She was brought back today just for range of motion check.  States she stopped wearing the splint about 1 week ago.  States when she went to [employer] this past Sunday and should pain she had no pain.  No reported numbness or tingling into the extremity or digits.    Pain scale: 0/10.    Overall, patient's symptoms are improved.    No reported fever, chills, night sweats or other constitutional symptoms.          Objective      Physical Exam  /88   Ht 165.1 cm (65\")   Wt 60.8 kg (134 lb)   LMP  (LMP Unknown)   BMI 22.30 kg/m²     Body mass index is 22.3 kg/m².        Ortho Exam  Right hand  Skin: Grossly intact without any redness, warmth or swelling.      Tenderness:   None.  Alignment, rotation appears satisfactory no evidence of scissoring or rotation quality.  Motor/sensory: Grossly intact C5-T1.      Imaging/Studies  No new imaging today.      Assessment:  1. Closed nondisplaced fracture of base of fifth metacarpal bone of right hand with routine healing, subsequent encounter        Plan:  1. Nondisplaced fracture base of right fifth metacarpal, stable and healed.  2. Continue with range of motion wrist and digits.  3. May increase activity as tolerated.  4. Recommend OTC NSAIDs/pain medication as needed.  5. Follow up as needed.  6. Questions and concerns answered.      Francine Vicente PA-C  10/17/22  13:46 EDT      Dictated Utilizing Dragon Dictation.  "

## 2022-12-01 ENCOUNTER — TELEMEDICINE (OUTPATIENT)
Dept: INTERNAL MEDICINE | Facility: CLINIC | Age: 77
End: 2022-12-01

## 2022-12-01 DIAGNOSIS — J06.9 UPPER RESPIRATORY TRACT INFECTION, UNSPECIFIED TYPE: Primary | ICD-10-CM

## 2022-12-01 LAB
EXPIRATION DATE: NORMAL
EXPIRATION DATE: NORMAL
FLUAV AG UPPER RESP QL IA.RAPID: NOT DETECTED
FLUBV AG UPPER RESP QL IA.RAPID: NOT DETECTED
INTERNAL CONTROL: NORMAL
INTERNAL CONTROL: NORMAL
Lab: NORMAL
Lab: NORMAL
S PYO AG THROAT QL: NEGATIVE
SARS-COV-2 RNA RESP QL NAA+PROBE: NOT DETECTED

## 2022-12-01 PROCEDURE — 87636 SARSCOV2 & INF A&B AMP PRB: CPT | Performed by: PHYSICIAN ASSISTANT

## 2022-12-01 PROCEDURE — 87880 STREP A ASSAY W/OPTIC: CPT | Performed by: PHYSICIAN ASSISTANT

## 2022-12-01 PROCEDURE — 99213 OFFICE O/P EST LOW 20 MIN: CPT | Performed by: PHYSICIAN ASSISTANT

## 2022-12-01 RX ORDER — AZITHROMYCIN 250 MG/1
TABLET, FILM COATED ORAL
Qty: 6 TABLET | Refills: 0 | Status: SHIPPED | OUTPATIENT
Start: 2022-12-01 | End: 2022-12-27

## 2022-12-01 NOTE — PROGRESS NOTES
[facility]  UROLOGY  ________________________________________________    OUTPATIENT NOTE    Patient: Jere Barboza    : 1954  MRN: [id number]        REASON FOR VISIT      3 month PSA f/u     UROLOGIC COMPLAINTS TODAY      denies urologic symptoms      HISTORY OF PRESENT ILLNESS     Jere Barboza is a 67 year old with urologic history of ED and elevated PSA s/p TRUS PNB 21 (surgical pathology: benign; prebiopsy psa 5.1 ng/mL). He is now here for 3 month PSA results and PVR check. He states that he tried sildenafil 100 mg but caused upset stomach. Wishes to trial alternative. No other complaints. No known family hx of PCa. PSA today is 4.53 ng/mL.      Pertinent labs:  Results for orders placed or performed in visit on 21   POCT POST VOID RES URINE/BL BY US   Result Value    BLDR Scan mLs 0 mL     BLDR Scan mLs   Date Value Ref Range Status   2021 0 mL  Final         REVIEW OF SYSTEMS     All systems are negative unless otherwise stated in the HPI  ROS        PAST HISTORY      Past Medical History:   Diagnosis Date   • BPH (benign prostatic hyperplasia)    • ED (erectile dysfunction)    • Elevated PSA    • Prostate disorder    • Prostatic calculi        Past Surgical History:   Procedure Laterality Date   • Mole removal     • Us guided prostate biopsy  2021 for PSA of 5.1    Path= benign (prostate volume=68.2cc's)       Family History   Problem Relation Age of Onset   • Cancer Sister          Tobacco Use: Never             Alcohol Use: Not Asked         Drug Use:    Not Asked             MEDICATIONS AND ALLERGIES        Outpatient Encounter Medications as of 2021   Medication Sig Dispense Refill   • omeprazole (PrilOSEC) 20 MG capsule Take 20 mg by mouth every evening.     • tadalafil (CIALIS) 20 MG tablet Take 1 tablet by mouth as needed for Erectile Dysfunction. On empty stomach 30-60 minutes prior to intended erection 30 tablet 0   • [DISCONTINUED] levoFLOXacin  Mode of Visit: Video  Location of patient: Home address  Location of provider: [facility] office at [address], [place] [address]  You have chosen to receive care through a telehealth visit.  Do you consent to use a audio/video connection for your medical care today? Yes  This was an audio and video enabled telemedicine encounter.  No technical issues occurred during this visit.    Chief Complaint  Cough    Subjective          History of Present Illness  Jacques Yeboah presents to [facility] for   History of Present Illness  Cough/URI:  Sx started 2-3 days ago with cough. She is worried it will develop into bronchitis. No soa or wheeze. No fevers. Cough is not productive. Does not have to use albuterol or steroids for illness. Stays pretty healthy most of the time. Req zpack as this is what has worked well for her in the past.   She had negative covid and flu tests today. No exposure to covid or flu that she knows of.      Review of Systems   Constitutional: Negative for fever and unexpected weight loss.   HENT: Positive for congestion and rhinorrhea. Negative for sinus pressure and sore throat.    Respiratory: Positive for cough. Negative for shortness of breath and wheezing.    Cardiovascular: Negative for chest pain and palpitations.   Gastrointestinal: Negative for abdominal pain, diarrhea, nausea and vomiting.   Musculoskeletal: Negative for myalgias.   Skin: Negative for rash.   Neurological: Negative for headache.       The following portions of the patient's history were reviewed and updated as appropriate: allergies, current medications, past family history, past medical history, past social history, past surgical history and problem list.    No Known Allergies    Current Outpatient Medications:   •  atorvastatin (LIPITOR) 10 MG tablet, Take 1 tablet by mouth Daily., Disp: 90 tablet, Rfl: 1  •  azithromycin (Zithromax Z-Romel) 250 MG tablet, Take  (Levaquin) 750 MG tablet Take one tab po the day before, day of and day after procedure 3 tablet 0   • sildenafil (Viagra) 100 MG tablet Take 1 tablet by mouth as needed for Erectile Dysfunction. 6 tablet 0     No facility-administered encounter medications on file as of 12/29/2021.       Medications were reviewed and updated today.      ALLERGIES  ALLERGIES:  No Known Allergies        PHYSICAL EXAM AND VITAL SIGNS     Physical Exam  Constitutional:       Appearance: He is well-developed.   HENT:      Head: Normocephalic and atraumatic.      Right Ear: External ear normal.      Left Ear: External ear normal.   Eyes:      Pupils: Pupils are equal, round, and reactive to light.   Cardiovascular:      Rate and Rhythm: Normal rate and regular rhythm.      Heart sounds: Normal heart sounds.   Pulmonary:      Effort: Pulmonary effort is normal.      Breath sounds: Normal breath sounds.   Abdominal:      General: There is no distension.      Palpations: Abdomen is soft.      Tenderness: There is no abdominal tenderness. There is no rebound.   Musculoskeletal:         General: No tenderness. Normal range of motion.      Cervical back: Normal range of motion.   Skin:     General: Skin is warm.      Coloration: Skin is not pale.      Findings: No erythema or rash.   Neurological:      Mental Status: He is alert and oriented to person, place, and time.          Visit Vitals  BP (!) 150/80   Pulse 77   Temp 97.3 °F (36.3 °C) (Temporal)   Resp 16   Ht 5' 6\" (1.676 m)   Wt 88.9 kg (196 lb)   SpO2 95%   BMI 31.64 kg/m²          LABORATORY     Results for orders placed or performed in visit on 12/29/21   POCT POST VOID RES URINE/BL BY US   Result Value    BLDR Scan mLs 0 mL       BLDR Scan mLs   Date Value Ref Range Status   12/29/2021 0 mL  Final         Office Visit on 12/29/2021   Component Date Value Ref Range Status   • BLDR Scan mLs 12/29/2021 0 mL   Final         LAST HCT:  No results found for: HCT    LAST CREATININE:  No  2 tablets by mouth on day 1, then 1 tablet daily on days 2-5, Disp: 6 tablet, Rfl: 0  New Medications Ordered This Visit   Medications   • azithromycin (Zithromax Z-Romel) 250 MG tablet     Sig: Take 2 tablets by mouth on day 1, then 1 tablet daily on days 2-5     Dispense:  6 tablet     Refill:  0     Social History     Tobacco Use   Smoking Status Never   Smokeless Tobacco Never        Objective   There were no vitals filed for this visit.  There is no height or weight on file to calculate BMI.    Physical Exam   Constitutional: She appears well-developed and well-nourished. No distress.   HENT:   Head: Normocephalic and atraumatic.   Eyes: Conjunctivae and EOM are normal.   Neck: Neck normal appearance.  Pulmonary/Chest: Effort normal.  No respiratory distress. She no audible wheeze...  Neurological: She is alert.   Psychiatric: She has a normal mood and affect.   Vitals reviewed.      Result Review :   Results for orders placed or performed in visit on 12/01/22   POC Rapid Strep A    Specimen: Swab   Result Value Ref Range    Rapid Strep A Screen Negative Negative, VALID, INVALID, Not Performed    Internal Control Passed Passed    Lot Number [id number]     Expiration Date 5/31/2024    JENNIE FLU + SARS PCR    Specimen: Nasal Cavity; Swab   Result Value Ref Range    COVID19 Not Detected Not Detected - Ref. Range    Influenza A Antigen RAGHAV Not Detected Not Detected    Influenza B Antigen RAGHAV Not Detected Not Detected    Internal Control Passed Passed    Lot Number [id number]     Expiration Date 11/12/2023           Assessment and Plan    Diagnoses and all orders for this visit:    1. Upper respiratory tract infection, unspecified type (Primary)  Assessment & Plan:  Supportive care, f/u if sx worsen or persist. Can use bromfed as previously rxed prn for cold/cough sx. Start zpack.     Orders:  -     azithromycin (Zithromax Z-Romel) 250 MG tablet; Take 2 tablets by mouth on day 1, then 1 tablet daily on days 2-5  Dispense:  6 tablet; Refill: 0  -     POC Rapid Strep A  -     JENNIE FLU + SARS PCR          Follow Up   Return if symptoms worsen or fail to improve.    Follow up if symptoms worsen or persist or has new or concerning symptoms, go to ER for severe symptoms.   I discussed my findings, recommendations, and plan of care with the patient. Reviewed common medication effects and side effects and to report side effects immediately. Encouraged medication compliance and the importance of keeping scheduled follow up appointments. Pt verbalized understanding and agreement.  If a referral was made please contact our office if you have not heard about an appointment in the next 2 weeks.   If labs or images are ordered we will contact you with the results within the next week.  If you have not heard from us after a week please call our office to inquire about the results.     I have reviewed the limitations of a telehealth visit (such as lack of a physical exam and unable to obtain vital signs) and advised the patient that they may need to follow up for an office visit should their symptoms or concerns persist, worsen, or change.    Aimee Emanuel PA-C    * Please note that portions of this note were completed with a voice recognition program.    results found for: CREATININE          PATHOLOGY     8/27/21   Pathologic Diagnosis   A.   Prostate, left base; ultrasound-guided needle biopsy:  -Benign prostatic tissue with focal adenosis  -Supported by positive HMWCK immunostain, [id number] (basal cells present)     B.   Prostate, right base; ultrasound-guided needle biopsy:  -Benign prostatic tissue with focal atrophy  -Supported by positive HMWCK immunostain, [id number] (basal cells present)     C.   Prostate, left mid; ultrasound-guided needle biopsy:  -Benign prostatic tissue  -Supported by positive HMWCK immunostain, [id number] (basal cells present)     D.   Prostate, right mid; ultrasound-guided needle biopsy:  -Benign prostatic tissue with focal atrophy  -Supported by positive HMWCK immunostain, [id number] (basal cells present)     E.   Prostate, left apex; ultrasound-guided needle biopsy:  -Benign prostatic tissue with focal adenosis  -Supported by positive HMWCK immunostain, [id number] (basal cells present)     F.   Prostate, right apex; ultrasound-guided needle biopsy:  -Benign prostatic tissue  -Supported by positive HMWCK immunostain, [id number] (basal cells present)         IMAGING       LAST CT:  No results found for this or any previous visit.    LAST X-RAY:  No results found for this or any previous visit.    LAST U/S:  No results found for this or any previous visit.      ASSESSMENT     1. Elevated PSA    2. History of needle biopsy of prostate with negative result    3. Other male erectile dysfunction    4. BPH with urinary obstruction          Elevated PSA  - PSA today is 4.5 ng/ml  - now status post prostate needle biopsy, tolerated procedure well without complication  - pathology reviewed with patient and reveals benign findings; reliability of biopsy reviewed  - we discussed potential benign causes of elevated psa  - explained importance of continued close psa surveillance; recommend psa in 3 months. If stable, then will monitor every 6 months.  -  patient is comfortable with this plan.        Erectile dysfunction  - sildenafil 100 mg PRN without significant success d/t upset stomach  - we reviewed treatment options including oral medications, vacuum erection device. When oral therapy and/or vacuum erection treatment fails, reasonable to consider treatment with intraurethral suppository, intracavernosal injections. We discussed the respective s/e and benefits as well as administration instructions.   - PDE5s are contraindicated in patients taking nitrates for chest pain.  - he wishes to trial tadalafil 20 mg.       BPH with obstruction  - PVR 0 mL 12/29/21  - grossly asymptomatic               PLAN       Orders Placed This Encounter   • PSA     Order Specific Question:   How should test results be released to the patient's [employer] portal?     Answer:   Automatic Release   • POCT POST VOID RES URINE/BL BY US   • tadalafil (CIALIS) 20 MG tablet     Sig: Take 1 tablet by mouth as needed for Erectile Dysfunction. On empty stomach 30-60 minutes prior to intended erection     Dispense:  30 tablet     Refill:  0        Return for tadalafil follow-up   Return in about 6 months (around 6/29/2022) for PSA.      _____________________________________________________      Scribe Signature:  Devorah SHEPPARD CMA and Yosvany Blank NP personally scribed the services dictated to me by Kris Naik M.D. in this documentation.

## 2022-12-01 NOTE — ASSESSMENT & PLAN NOTE
Supportive care, f/u if sx worsen or persist. Can use bromfed as previously rxed prn for cold/cough sx. Start zpack.

## 2022-12-06 ENCOUNTER — OFFICE VISIT (OUTPATIENT)
Dept: INTERNAL MEDICINE | Facility: CLINIC | Age: 77
End: 2022-12-06

## 2022-12-06 VITALS
HEART RATE: 88 BPM | SYSTOLIC BLOOD PRESSURE: 128 MMHG | RESPIRATION RATE: 20 BRPM | TEMPERATURE: 97.2 F | WEIGHT: 132.94 LBS | HEIGHT: 65 IN | DIASTOLIC BLOOD PRESSURE: 84 MMHG | OXYGEN SATURATION: 98 % | BODY MASS INDEX: 22.15 KG/M2

## 2022-12-06 DIAGNOSIS — J40 BRONCHITIS: Primary | ICD-10-CM

## 2022-12-06 PROCEDURE — 99213 OFFICE O/P EST LOW 20 MIN: CPT | Performed by: PHYSICIAN ASSISTANT

## 2022-12-06 RX ORDER — DOXYCYCLINE 100 MG/1
100 TABLET ORAL 2 TIMES DAILY
Qty: 14 TABLET | Refills: 0 | Status: SHIPPED | OUTPATIENT
Start: 2022-12-06 | End: 2022-12-13

## 2022-12-06 RX ORDER — BROMPHENIRAMINE MALEATE, PSEUDOEPHEDRINE HYDROCHLORIDE, AND DEXTROMETHORPHAN HYDROBROMIDE 2; 30; 10 MG/5ML; MG/5ML; MG/5ML
5 SYRUP ORAL EVERY 6 HOURS PRN
Qty: 120 ML | Refills: 0 | Status: SHIPPED | OUTPATIENT
Start: 2022-12-06 | End: 2022-12-13

## 2022-12-06 RX ORDER — PREDNISONE 20 MG/1
40 TABLET ORAL DAILY
Qty: 10 TABLET | Refills: 0 | Status: SHIPPED | OUTPATIENT
Start: 2022-12-06 | End: 2022-12-11

## 2022-12-06 RX ORDER — BENZONATATE 100 MG/1
100-200 CAPSULE ORAL 3 TIMES DAILY PRN
Qty: 30 CAPSULE | Refills: 0 | Status: SHIPPED | OUTPATIENT
Start: 2022-12-06 | End: 2022-12-27 | Stop reason: SDUPTHER

## 2022-12-06 NOTE — PROGRESS NOTES
Answers for HPI/ROS submitted by the patient on 12/5/2022  What is the primary reason for your visit?: Cough  Chronicity: recurrent  Onset: in the past 7 days  Progression since onset: gradually improving  Frequency: every few hours  Cough characteristics: non-productive  chest pain: No  chills: Yes  ear congestion: No  ear pain: No  fever: No  headaches: Yes  heartburn: No  hemoptysis: No  myalgias: No  nasal congestion: Yes  postnasal drip: No  rash: No  rhinorrhea: No  shortness of breath: No  sore throat: No  sweats: No  wheezing: No  Aggravated by: lying down    Chief Complaint  Cough (Bronchitis )    Subjective          History of Present Illness  Jacques Yeboah presents to [facility] for   History of Present Illness  Cough/URI:  Has been sick for the last week with cough and runny nose. Cough is worse. She completed the zpack but sx are getting worse. No fevers. No vomiting. Has felt wheezy. Has not had pneumonia in the past. Stays pretty healthy most of the time. She had negative covid and flu tests today. No covid/flu exposures that she knows of. Gets bronchitis once a year. Does not use albuterol, does not need steroids for illness often.    Cough  This is a recurrent problem. The current episode started in the past 7 days. The problem has been gradually improving. The problem occurs every few hours. The cough is non-productive. Associated symptoms include chills, headaches and nasal congestion. Pertinent negatives include no chest pain, ear congestion, ear pain, fever, heartburn, hemoptysis, myalgias, postnasal drip, rash, rhinorrhea, sore throat, shortness of breath, sweats or wheezing. The symptoms are aggravated by lying down.       Review of Systems   Constitutional: Positive for chills. Negative for fever.   HENT: Negative for ear pain, postnasal drip, rhinorrhea and sore throat.    Respiratory: Positive for cough. Negative for hemoptysis, shortness of breath and  wheezing.    Cardiovascular: Negative for chest pain.   Musculoskeletal: Negative for myalgias.   Skin: Negative for rash.       The following portions of the patient's history were reviewed and updated as appropriate: allergies, current medications, past family history, past medical history, past social history, past surgical history and problem list.  No Known Allergies    Current Outpatient Medications:   •  atorvastatin (LIPITOR) 10 MG tablet, Take 1 tablet by mouth Daily., Disp: 90 tablet, Rfl: 1  •  azithromycin (Zithromax Z-Romel) 250 MG tablet, Take 2 tablets by mouth on day 1, then 1 tablet daily on days 2-5, Disp: 6 tablet, Rfl: 0  •  benzonatate (Tessalon Perles) 100 MG capsule, Take 1-2 capsules by mouth 3 (Three) Times a Day As Needed for Cough., Disp: 30 capsule, Rfl: 0  •  brompheniramine-pseudoephedrine-DM 30-2-10 MG/5ML syrup, Take 5 mL by mouth Every 6 (Six) Hours As Needed for Congestion or Cough for up to 7 days., Disp: 120 mL, Rfl: 0  •  doxycycline (ADOXA) 100 MG tablet, Take 1 tablet by mouth 2 (Two) Times a Day for 7 days., Disp: 14 tablet, Rfl: 0  •  predniSONE (DELTASONE) 20 MG tablet, Take 2 tablets by mouth Daily for 5 days., Disp: 10 tablet, Rfl: 0  New Medications Ordered This Visit   Medications   • benzonatate (Tessalon Perles) 100 MG capsule     Sig: Take 1-2 capsules by mouth 3 (Three) Times a Day As Needed for Cough.     Dispense:  30 capsule     Refill:  0   • doxycycline (ADOXA) 100 MG tablet     Sig: Take 1 tablet by mouth 2 (Two) Times a Day for 7 days.     Dispense:  14 tablet     Refill:  0   • predniSONE (DELTASONE) 20 MG tablet     Sig: Take 2 tablets by mouth Daily for 5 days.     Dispense:  10 tablet     Refill:  0   • brompheniramine-pseudoephedrine-DM 30-2-10 MG/5ML syrup     Sig: Take 5 mL by mouth Every 6 (Six) Hours As Needed for Congestion or Cough for up to 7 days.     Dispense:  120 mL     Refill:  0     Social History     Tobacco Use   Smoking Status Never  "  Smokeless Tobacco Never        Objective   Vital Signs:   Vitals:    12/06/22 1437   BP: 128/84   Pulse: 88   Resp: 20   Temp: 97.2 °F (36.2 °C)   SpO2: 98%   Weight: 60.3 kg (132 lb 15 oz)   Height: 165.1 cm (65\")      Physical Exam  Vitals reviewed.   Constitutional:       General: She is not in acute distress.     Appearance: Normal appearance.   HENT:      Head: Normocephalic and atraumatic.   Eyes:      General: No scleral icterus.     Extraocular Movements: Extraocular movements intact.      Conjunctiva/sclera: Conjunctivae normal.   Cardiovascular:      Rate and Rhythm: Normal rate and regular rhythm.      Heart sounds: Normal heart sounds. No murmur heard.  Pulmonary:      Effort: Pulmonary effort is normal. No respiratory distress.      Breath sounds: No stridor. Wheezing present. No rhonchi.   Musculoskeletal:      Cervical back: Normal range of motion and neck supple.   Skin:     General: Skin is warm and dry.      Coloration: Skin is not jaundiced.   Neurological:      General: No focal deficit present.      Mental Status: She is alert and oriented to person, place, and time.      Gait: Gait normal.   Psychiatric:         Mood and Affect: Mood normal.         Behavior: Behavior normal.        No LMP recorded (lmp unknown). Patient is postmenopausal.    Result Review :                   Assessment and Plan    Diagnoses and all orders for this visit:    1. Bronchitis (Primary)  Assessment & Plan:  Change abx to doxycycline, rx steroids and bromfed prn, f/u if sx cont to worsen or has new sx. ER for resp distress. If no improvement over the next few days will get xray    Orders:  -     benzonatate (Tessalon Perles) 100 MG capsule; Take 1-2 capsules by mouth 3 (Three) Times a Day As Needed for Cough.  Dispense: 30 capsule; Refill: 0  -     doxycycline (ADOXA) 100 MG tablet; Take 1 tablet by mouth 2 (Two) Times a Day for 7 days.  Dispense: 14 tablet; Refill: 0  -     predniSONE (DELTASONE) 20 MG tablet; " Take 2 tablets by mouth Daily for 5 days.  Dispense: 10 tablet; Refill: 0  -     brompheniramine-pseudoephedrine-DM 30-2-10 MG/5ML syrup; Take 5 mL by mouth Every 6 (Six) Hours As Needed for Congestion or Cough for up to 7 days.  Dispense: 120 mL; Refill: 0      Follow Up   Return if symptoms worsen or fail to improve.    Follow up if symptoms worsen or persist or has new or concerning symptoms, go to ER for severe symptoms.   Reviewed common medication effects and side effects and advised to report side effects immediately.  Encouraged medication compliance and the importance of keeping scheduled follow up appointments with me and any other providers.  If a referral was made please contact our office if you have not heard about an appointment in the next 2 weeks.   If labs or images are ordered we will contact you with the results within the next week.  If you have not heard from us after a week please call our office to inquire about the results.   Patient was given instructions and counseling regarding her condition or for health maintenance advice. Please see specific information pulled into the AVS if appropriate.     Aimee Emanuel PA-C    * Please note that portions of this note were completed with a voice recognition program.

## 2022-12-07 PROBLEM — J40 BRONCHITIS: Status: ACTIVE | Noted: 2022-12-07

## 2022-12-07 NOTE — ASSESSMENT & PLAN NOTE
Change abx to doxycycline, rx steroids and bromfed prn, f/u if sx cont to worsen or has new sx. ER for resp distress. If no improvement over the next few days will get xray

## 2022-12-27 ENCOUNTER — TELEPHONE (OUTPATIENT)
Dept: INTERNAL MEDICINE | Facility: CLINIC | Age: 77
End: 2022-12-27

## 2022-12-27 ENCOUNTER — OFFICE VISIT (OUTPATIENT)
Dept: INTERNAL MEDICINE | Facility: CLINIC | Age: 77
End: 2022-12-27

## 2022-12-27 VITALS
HEART RATE: 88 BPM | WEIGHT: 132 LBS | BODY MASS INDEX: 21.99 KG/M2 | HEIGHT: 65 IN | TEMPERATURE: 97.1 F | OXYGEN SATURATION: 98 % | DIASTOLIC BLOOD PRESSURE: 70 MMHG | SYSTOLIC BLOOD PRESSURE: 110 MMHG

## 2022-12-27 DIAGNOSIS — J40 BRONCHITIS: ICD-10-CM

## 2022-12-27 PROCEDURE — 99213 OFFICE O/P EST LOW 20 MIN: CPT | Performed by: FAMILY MEDICINE

## 2022-12-27 RX ORDER — DOXYCYCLINE 100 MG/1
100 CAPSULE ORAL 2 TIMES DAILY
Qty: 20 CAPSULE | Refills: 0 | Status: SHIPPED | OUTPATIENT
Start: 2022-12-27 | End: 2023-02-06

## 2022-12-27 RX ORDER — BENZONATATE 100 MG/1
100-200 CAPSULE ORAL 3 TIMES DAILY PRN
Qty: 30 CAPSULE | Refills: 0 | Status: SHIPPED | OUTPATIENT
Start: 2022-12-27 | End: 2023-02-06

## 2022-12-27 NOTE — TELEPHONE ENCOUNTER
Caller: Jersey Yeboahbrett MOISES    Relationship: Self    Best call back number: [phone]    What medication are you requesting: COUGH SYRUP-ANTIBIOTIC    What are your current symptoms: HACKY COUGH    How long have you been experiencing symptoms: 3-4 DAYS    Have you had these symptoms before:    [x] Yes  [] No    Have you been treated for these symptoms before:   [x] Yes  [] No    If a prescription is needed, what is your preferred pharmacy and phone number: [id number] [address] AT [facility] - [phone] [facility] [phone]      Additional notes: PATIENT WAS SEEN ON 12/6/22 AND BELIEVES SHE HAS THE SAME THING AGAIN. WOULD LIKE THE SAME MEDICATIONS PRESCRIBED MINUS THE PEARLS.

## 2022-12-27 NOTE — TELEPHONE ENCOUNTER
Notified the patient that it is recommended that she be seen again for this. Scheduled her for an appointment this afternoon

## 2022-12-27 NOTE — PROGRESS NOTES
"Rudy Yeboah is a 77 y.o. female.     Chief Complaint   Patient presents with   • Bronchitis     Chronic cough. Saw Aimee on 12/1/22         Visit Vitals  /70   Pulse 88   Temp 97.1 °F (36.2 °C)   Ht 165.1 cm (65\")   Wt 59.9 kg (132 lb)   LMP  (LMP Unknown)   SpO2 98%   BMI 21.97 kg/m²         Bronchitis  This is a recurrent problem. The current episode started 1 to 4 weeks ago. The problem occurs intermittently. The problem has been gradually worsening. Associated symptoms include coughing. Pertinent negatives include no abdominal pain, anorexia, arthralgias, change in bowel habit, chest pain, chills, congestion, diaphoresis, fatigue, fever, headaches, joint swelling, myalgias, nausea, neck pain, numbness, rash, sore throat, swollen glands, urinary symptoms, vertigo, visual change, vomiting or weakness. Nothing aggravates the symptoms. She has tried nothing for the symptoms. The treatment provided no relief.      Pt had bronchitis the beginning of December and took a Zpak and improved.  This weekend pt started to get sick again.   Pt has been watching her sugar intake.     The following portions of the patient's history were reviewed and updated as appropriate: allergies, current medications, past family history, past medical history, past social history, past surgical history and problem list.    Past Medical History:   Diagnosis Date   • Arthritis    • Celiac disease    • H/O bone density study    • Hip pain    • History of colonoscopy     CSGA   • History of mammogram 08/13/2020   • Inflammatory arthritis    • Lumbago    • Lumbar degenerative disc disease    • Metatarsalgia, left foot    • Osteoarthritis of knees, bilateral    • Pelvic fracture (HCC)    • Primary osteoarthritis of both hips    • Sciatica    • Thoracic spondylosis       Past Surgical History:   Procedure Laterality Date   • COLONOSCOPY      CSGA   • EYE SURGERY     • FRACTURE SURGERY      left wrist; pelvis   • " TONSILLECTOMY     • TOTAL HIP ARTHROPLASTY Right 10/3/2017    Procedure: TOTAL HIP ARTHROPLASTY RIGHT;  Surgeon: Prem Hodges MD;  [facility]  Service:    • TUBAL ABDOMINAL LIGATION        Family History   Problem Relation Age of Onset   • Heart failure Mother         CHF   • Osteoarthritis Mother    • Heart attack Father         Acute MI   • Stroke Father         Stroke syndrome   • Breast cancer Paternal Aunt         MID 70'S   • Ovarian cancer Neg Hx       Social History     Socioeconomic History   • Marital status:    Tobacco Use   • Smoking status: Never   • Smokeless tobacco: Never   Vaping Use   • Vaping Use: Never used   Substance and Sexual Activity   • Alcohol use: No   • Drug use: No   • Sexual activity: Defer      No Known Allergies    Review of Systems   Constitutional: Negative for chills, diaphoresis, fatigue and fever.   HENT: Negative for congestion and sore throat.    Respiratory: Positive for cough.    Cardiovascular: Negative for chest pain.   Gastrointestinal: Negative for abdominal pain, anorexia, change in bowel habit, nausea and vomiting.   Musculoskeletal: Negative for arthralgias, joint swelling, myalgias and neck pain.   Skin: Negative for rash.   Neurological: Negative for vertigo, weakness, numbness and headaches.       Objective   Physical Exam  Vitals and nursing note reviewed.   Constitutional:       Appearance: She is well-developed.   HENT:      Head: Normocephalic.      Right Ear: External ear normal.      Left Ear: External ear normal.      Nose: Nose normal.   Eyes:      General: Lids are normal.      Conjunctiva/sclera: Conjunctivae normal.      Pupils: Pupils are equal, round, and reactive to light.   Neck:      Thyroid: No thyroid mass or thyromegaly.      Vascular: No carotid bruit.      Trachea: Trachea normal.   Cardiovascular:      Rate and Rhythm: Normal rate and regular rhythm.      Heart sounds: No murmur heard.  Pulmonary:      Effort: Pulmonary  effort is normal. No respiratory distress.      Breath sounds: Normal breath sounds. No decreased breath sounds, wheezing, rhonchi or rales.   Chest:      Chest wall: No tenderness.   Abdominal:      General: Bowel sounds are normal.      Palpations: Abdomen is soft.      Tenderness: There is no abdominal tenderness.   Musculoskeletal:         General: Normal range of motion.      Cervical back: Normal range of motion and neck supple.   Skin:     General: Skin is warm and dry.   Neurological:      Mental Status: She is alert and oriented to person, place, and time.   Psychiatric:         Behavior: Behavior normal.         Assessment & Plan   Diagnoses and all orders for this visit:    1. Bronchitis  -     benzonatate (Tessalon Perles) 100 MG capsule; Take 1-2 capsules by mouth 3 (Three) Times a Day As Needed for Cough.  Dispense: 30 capsule; Refill: 0  -     doxycycline (MONODOX) 100 MG capsule; Take 1 capsule by mouth 2 (Two) Times a Day.  Dispense: 20 capsule; Refill: 0      Increase clear liquids             Current Outpatient Medications:   •  atorvastatin (LIPITOR) 10 MG tablet, Take 1 tablet by mouth Daily., Disp: 90 tablet, Rfl: 1  •  benzonatate (Tessalon Perles) 100 MG capsule, Take 1-2 capsules by mouth 3 (Three) Times a Day As Needed for Cough., Disp: 30 capsule, Rfl: 0  •  doxycycline (MONODOX) 100 MG capsule, Take 1 capsule by mouth 2 (Two) Times a Day., Disp: 20 capsule, Rfl: 0    Return if symptoms worsen or fail to improve, for Recheck.

## 2022-12-27 NOTE — TELEPHONE ENCOUNTER
Needs a visit, this would be the 3rd abx this month, may need to go to [facility] if we can't get her in, she probably needs xray.

## 2023-02-01 RX ORDER — ATORVASTATIN CALCIUM 10 MG/1
10 TABLET, FILM COATED ORAL DAILY
Qty: 90 TABLET | Refills: 1 | Status: SHIPPED | OUTPATIENT
Start: 2023-02-01

## 2023-02-01 NOTE — TELEPHONE ENCOUNTER
Caller: Jersey Yeboahbrett MOISES    Relationship: Self    Best call back number: [phone]     Requested Prescriptions:   Requested Prescriptions     Pending Prescriptions Disp Refills   • atorvastatin (LIPITOR) 10 MG tablet 90 tablet 1     Sig: Take 1 tablet by mouth Daily.        Pharmacy where request should be sent: [id number] [address] AT [facility] [facility] & [address] - [phone]  - [phone] FX     Additional details provided by patient: PATIENT HAS CALLED REQUESTING A NEW 90 DAY PRESCRIPTION ON ABOVE MEDICATION    Does the patient have less than a 3 day supply:  [] Yes  [x] No    Would you like a call back once the refill request has been completed: [x] Yes [] No    If the office needs to give you a call back, can they leave a voicemail: [x] Yes [] No    Anne Owusu   02/01/23 15:57 EST

## 2023-02-06 ENCOUNTER — OFFICE VISIT (OUTPATIENT)
Dept: ORTHOPEDIC SURGERY | Facility: CLINIC | Age: 78
End: 2023-02-06
Payer: MEDICARE

## 2023-02-06 VITALS
WEIGHT: 136.8 LBS | HEIGHT: 65 IN | SYSTOLIC BLOOD PRESSURE: 126 MMHG | BODY MASS INDEX: 22.79 KG/M2 | DIASTOLIC BLOOD PRESSURE: 78 MMHG

## 2023-02-06 DIAGNOSIS — Z96.641 STATUS POST TOTAL REPLACEMENT OF RIGHT HIP: ICD-10-CM

## 2023-02-06 DIAGNOSIS — M17.11 OSTEOARTHRITIS OF RIGHT KNEE, UNSPECIFIED OSTEOARTHRITIS TYPE: Primary | ICD-10-CM

## 2023-02-06 PROCEDURE — 99214 OFFICE O/P EST MOD 30 MIN: CPT | Performed by: ORTHOPAEDIC SURGERY

## 2023-02-06 NOTE — PROGRESS NOTES
[facility] Note    Subjective     Chief Complaint   Patient presents with   • Right Knee - Pain     2. Right Hip Check-Total Hip Arthroplasty 10/3/17        HPI    Jacques Yeboah is a 77 y.o. female who presents with new problem of: right knee pain.  Onset: atraumatic and gradual in nature. The issue has been ongoing for 2 week(s). Pain is a 1-8/10 on the pain scale. Pain is described as aching. Associated symptoms include nothing. The pain is worse with sitting; resting and sitting improve the pain. Previous treatments have included: nothing.     Her right total hip arthroplasty is doing well, and she would like to have that checked out today also.  Right total hip replacement in 2017.  100% improvement compared to her preoperative symptoms.    I have reviewed the following portions of the patient's history and agree with: History of Present Illness and Review of Systems    Patient Active Problem List   Diagnosis   • Hyperlipidemia   • Arthritis of right hip   • Status post total replacement of right hip   • Celiac disease   • Primary osteoarthritis of left hip   • Medical orders for life-sustaining treatment (MOLST) form in chart   • Upper respiratory tract infection   • Bronchitis     Past Medical History:   Diagnosis Date   • Arthritis    • Arthritis of back Long history   • Celiac disease    • Fracture of wrist Aug 21 2022    Boxer’s fravture   • H/O bone density study    • Hip pain    • History of colonoscopy     CSGA   • History of mammogram 08/13/2020   • Inflammatory arthritis    • Lumbago    • Lumbar degenerative disc disease    • Metatarsalgia, left foot    • Osteoarthritis of knees, bilateral    • Pelvic fracture (HCC)    • Primary osteoarthritis of both hips    • Sciatica    • Thoracic spondylosis       Past Surgical History:   Procedure Laterality Date   • COLONOSCOPY      CSGA   • EYE SURGERY     • FRACTURE SURGERY      left wrist; pelvis   • JOINT REPLACEMENT  2017   •  TONSILLECTOMY     • TOTAL HIP ARTHROPLASTY Right 10/03/2017    Procedure: TOTAL HIP ARTHROPLASTY RIGHT;  Surgeon: Prem Hodges MD;  Location: [facility];  Service:    • TUBAL ABDOMINAL LIGATION        Family History   Problem Relation Age of Onset   • Heart failure Mother         CHF   • Osteoarthritis Mother    • Heart attack Father         Acute MI   • Stroke Father         Stroke syndrome   • Breast cancer Paternal Aunt         MID 70'S   • Ovarian cancer Neg Hx      Social History     Socioeconomic History   • Marital status:    Tobacco Use   • Smoking status: Never   • Smokeless tobacco: Never   Vaping Use   • Vaping Use: Never used   Substance and Sexual Activity   • Alcohol use: No   • Drug use: No   • Sexual activity: Not Currently     Comment: Pill for 9 years      Current Outpatient Medications on File Prior to Visit   Medication Sig Dispense Refill   • atorvastatin (LIPITOR) 10 MG tablet Take 1 tablet by mouth Daily. 90 tablet 1   • [DISCONTINUED] doxycycline (MONODOX) 100 MG capsule Take 1 capsule by mouth 2 (Two) Times a Day. 20 capsule 0   • [DISCONTINUED] benzonatate (Tessalon Perles) 100 MG capsule Take 1-2 capsules by mouth 3 (Three) Times a Day As Needed for Cough. 30 capsule 0     No current facility-administered medications on file prior to visit.      No Known Allergies     Review of Systems   Constitutional: Negative for activity change, appetite change, chills, diaphoresis, fatigue, fever and unexpected weight change.   HENT: Negative for congestion, dental problem, drooling, ear discharge, ear pain, facial swelling, hearing loss, mouth sores, nosebleeds, postnasal drip, rhinorrhea, sinus pressure, sneezing, sore throat, tinnitus, trouble swallowing and voice change.    Eyes: Negative for photophobia, pain, discharge, redness, itching and visual disturbance.   Respiratory: Negative for apnea, cough, choking, chest tightness, shortness of breath, wheezing and stridor.   "  Cardiovascular: Negative for chest pain, palpitations and leg swelling.   Gastrointestinal: Negative for abdominal distention, abdominal pain, anal bleeding, blood in stool, constipation, diarrhea, nausea, rectal pain and vomiting.   Endocrine: Negative for cold intolerance, heat intolerance, polydipsia, polyphagia and polyuria.   Genitourinary: Negative for decreased urine volume, difficulty urinating, dysuria, enuresis, flank pain, frequency, genital sores, hematuria and urgency.   Musculoskeletal: Positive for arthralgias. Negative for back pain, gait problem, joint swelling, myalgias, neck pain and neck stiffness.   Skin: Negative for color change, pallor, rash and wound.   Allergic/Immunologic: Negative for environmental allergies, food allergies and immunocompromised state.   Neurological: Negative for dizziness, tremors, seizures, syncope, facial asymmetry, speech difficulty, weakness, light-headedness, numbness and headaches.   Hematological: Negative for adenopathy. Does not bruise/bleed easily.   Psychiatric/Behavioral: Negative for agitation, behavioral problems, confusion, decreased concentration, dysphoric mood, hallucinations, self-injury, sleep disturbance and suicidal ideas. The patient is not nervous/anxious and is not hyperactive.         Objective      Physical Exam  /78   Ht 165.1 cm (65\")   Wt 62.1 kg (136 lb 12.8 oz)   LMP  (LMP Unknown)   BMI 22.76 kg/m²     Body mass index is 22.76 kg/m².    General:   Mental Status:  Alert   Appearance: Cooperative, in no acute distress   Build and Nutrition: Well-nourished well-developed female   Orientation: Alert and oriented to person, place and time   Posture: Normal   Gait: Nonantalgic    Integument:   Right hip: Wound is well-healed with no signs of infection   Right knee: No skin lesions, no rash, no ecchymosis    Lower Extremity:   Right Hip:    Tenderness:  None    Swelling:  None    Crepitus:  None    Range of motion:  External " Rotation: 30°       Internal Rotation: 30°       Flexion:  100°       Extension:  0°    Deformities:  None  Functional testing: Negative Stinchfield    No leg length discrepancy    Neurologic:   Sensation:    Right foot: Intact to light touch on the dorsal and plantar aspect   Motor:  Right lower extremity: 5/5 quadriceps, hamstrings, ankle dorsiflexors, and ankle plantar flexors    Vascular:   Right lower extremity: 2+ dorsalis pedis pulse, prompt capillary refill    Lower Extremities:   Right Knee:    Tenderness:  None    Effusion:  None    Swelling:  None    Crepitus:  Positive    Atrophy:  None    Range of motion:  Extension: 0°       Flexion: 120°  Instability:  No varus laxity, no valgus laxity, negative anterior drawer  Deformities:  None      Imaging/Studies      Imaging Results (Last 24 Hours)     Procedure Component Value Units Date/Time    XR Hip With or Without Pelvis 2 - 3 View Right [[id number]] Resulted: 02/06/23 1606     Updated: 02/06/23 1606    Narrative:      Right Hip Radiographs  Indication: status-post right total hip arthroplasty  Views: low AP pelvis and lateral of the right hip    Comparison: no change compared to prior study, 10/15/2018    Findings:   The components are well aligned, with no signs of loosening or failure.    XR Knee 4+ View Right [[id number]] Resulted: 02/06/23 1605     Updated: 02/06/23 1605    Narrative:      Right Knee Radiographs  Indication: right knee pain  Views: Standing AP's and skiers of both knees, with lateral and sunrise   views of the right knee    Comparison: no prior studies available    Findings:   Calcification within the medial lateral menisci, with joint space   narrowing, no acute bony abnormalities.  No unusual bony features.          Assessment and Plan     Diagnoses and all orders for this visit:    1. Osteoarthritis of right knee, unspecified osteoarthritis type (Primary)  -     XR Knee 4+ View Right    2. Status post total replacement of right hip  -      XR Hip With or Without Pelvis 2 - 3 View Right        1. Osteoarthritis of right knee, unspecified osteoarthritis type    2. Status post total replacement of right hip        I reviewed my findings with the patient.  Regarding her right knee, she does have arthritic changes, and she is seeing relief with Aleve, and she will also try ice.  Symptoms have been present for 2 weeks now.  Injection may be considered in the future if appropriate.  I will see her back in 6 weeks, but sooner for any problems.    Regarding her right total hip arthroplasty, that is functioning well and she is pleased with results.  Routine follow-up at 5-year increments.    Return in about 6 weeks (around 3/20/2023).      Prem Hodges MD  02/06/23  16:36 EST

## 2023-03-03 ENCOUNTER — OFFICE VISIT (OUTPATIENT)
Dept: INTERNAL MEDICINE | Facility: CLINIC | Age: 78
End: 2023-03-03
Payer: MEDICARE

## 2023-03-03 ENCOUNTER — LAB (OUTPATIENT)
Dept: INTERNAL MEDICINE | Facility: CLINIC | Age: 78
End: 2023-03-03
Payer: MEDICARE

## 2023-03-03 VITALS
HEART RATE: 72 BPM | HEIGHT: 64 IN | SYSTOLIC BLOOD PRESSURE: 118 MMHG | OXYGEN SATURATION: 98 % | TEMPERATURE: 96.9 F | BODY MASS INDEX: 22.88 KG/M2 | DIASTOLIC BLOOD PRESSURE: 72 MMHG | WEIGHT: 134 LBS

## 2023-03-03 DIAGNOSIS — R73.09 ELEVATED GLUCOSE: ICD-10-CM

## 2023-03-03 DIAGNOSIS — E78.00 PURE HYPERCHOLESTEROLEMIA: Chronic | ICD-10-CM

## 2023-03-03 DIAGNOSIS — Z00.00 MEDICARE ANNUAL WELLNESS VISIT, SUBSEQUENT: Primary | ICD-10-CM

## 2023-03-03 DIAGNOSIS — K90.0 CELIAC DISEASE: ICD-10-CM

## 2023-03-03 LAB
ALBUMIN SERPL-MCNC: 4.4 G/DL (ref 3.5–5.2)
ALBUMIN/GLOB SERPL: 1.6 G/DL
ALP SERPL-CCNC: 71 U/L (ref 39–117)
ALT SERPL W P-5'-P-CCNC: 20 U/L (ref 1–33)
ANION GAP SERPL CALCULATED.3IONS-SCNC: 9.2 MMOL/L (ref 5–15)
AST SERPL-CCNC: 24 U/L (ref 1–32)
BILIRUB SERPL-MCNC: 0.5 MG/DL (ref 0–1.2)
BUN SERPL-MCNC: 18 MG/DL (ref 8–23)
BUN/CREAT SERPL: 25 (ref 7–25)
CALCIUM SPEC-SCNC: 9.7 MG/DL (ref 8.6–10.5)
CHLORIDE SERPL-SCNC: 98 MMOL/L (ref 98–107)
CHOLEST SERPL-MCNC: 156 MG/DL (ref 0–200)
CO2 SERPL-SCNC: 29.8 MMOL/L (ref 22–29)
CREAT SERPL-MCNC: 0.72 MG/DL (ref 0.57–1)
DEPRECATED RDW RBC AUTO: 42.7 FL (ref 37–54)
EGFRCR SERPLBLD CKD-EPI 2021: 85.2 ML/MIN/1.73
ERYTHROCYTE [DISTWIDTH] IN BLOOD BY AUTOMATED COUNT: 12.4 % (ref 12.3–15.4)
GLOBULIN UR ELPH-MCNC: 2.7 GM/DL
GLUCOSE SERPL-MCNC: 109 MG/DL (ref 65–99)
HBA1C MFR BLD: 5.8 % (ref 4.8–5.6)
HCT VFR BLD AUTO: 42 % (ref 34–46.6)
HDLC SERPL-MCNC: 87 MG/DL (ref 40–60)
HGB BLD-MCNC: 13.9 G/DL (ref 12–15.9)
LDLC SERPL CALC-MCNC: 58 MG/DL (ref 0–100)
LDLC/HDLC SERPL: 0.67 {RATIO}
MCH RBC QN AUTO: 31.3 PG (ref 26.6–33)
MCHC RBC AUTO-ENTMCNC: 33.1 G/DL (ref 31.5–35.7)
MCV RBC AUTO: 94.6 FL (ref 79–97)
PLATELET # BLD AUTO: 247 10*3/MM3 (ref 140–450)
PMV BLD AUTO: 10.2 FL (ref 6–12)
POTASSIUM SERPL-SCNC: 4.7 MMOL/L (ref 3.5–5.2)
PROT SERPL-MCNC: 7.1 G/DL (ref 6–8.5)
RBC # BLD AUTO: 4.44 10*6/MM3 (ref 3.77–5.28)
SODIUM SERPL-SCNC: 137 MMOL/L (ref 136–145)
TRIGL SERPL-MCNC: 54 MG/DL (ref 0–150)
TSH SERPL DL<=0.05 MIU/L-ACNC: 1.93 UIU/ML (ref 0.27–4.2)
VLDLC SERPL-MCNC: 11 MG/DL (ref 5–40)
WBC NRBC COR # BLD: 4.4 10*3/MM3 (ref 3.4–10.8)

## 2023-03-03 PROCEDURE — 80061 LIPID PANEL: CPT | Performed by: INTERNAL MEDICINE

## 2023-03-03 PROCEDURE — 85027 COMPLETE CBC AUTOMATED: CPT | Performed by: INTERNAL MEDICINE

## 2023-03-03 PROCEDURE — 83036 HEMOGLOBIN GLYCOSYLATED A1C: CPT | Performed by: INTERNAL MEDICINE

## 2023-03-03 PROCEDURE — 1160F RVW MEDS BY RX/DR IN RCRD: CPT | Performed by: INTERNAL MEDICINE

## 2023-03-03 PROCEDURE — G0439 PPPS, SUBSEQ VISIT: HCPCS | Performed by: INTERNAL MEDICINE

## 2023-03-03 PROCEDURE — 1159F MED LIST DOCD IN RCRD: CPT | Performed by: INTERNAL MEDICINE

## 2023-03-03 PROCEDURE — 36415 COLL VENOUS BLD VENIPUNCTURE: CPT | Performed by: INTERNAL MEDICINE

## 2023-03-03 PROCEDURE — 80053 COMPREHEN METABOLIC PANEL: CPT | Performed by: INTERNAL MEDICINE

## 2023-03-03 PROCEDURE — 1126F AMNT PAIN NOTED NONE PRSNT: CPT | Performed by: INTERNAL MEDICINE

## 2023-03-03 PROCEDURE — 84443 ASSAY THYROID STIM HORMONE: CPT | Performed by: INTERNAL MEDICINE

## 2023-03-03 PROCEDURE — 1170F FXNL STATUS ASSESSED: CPT | Performed by: INTERNAL MEDICINE

## 2023-03-03 RX ORDER — CLINDAMYCIN HYDROCHLORIDE 300 MG/1
2 CAPSULE ORAL ONCE
COMMUNITY
Start: 2023-02-20 | End: 2023-03-20

## 2023-03-10 ENCOUNTER — TELEPHONE (OUTPATIENT)
Dept: INTERNAL MEDICINE | Facility: CLINIC | Age: 78
End: 2023-03-10
Payer: MEDICARE

## 2023-03-10 NOTE — TELEPHONE ENCOUNTER
Her A1C is only 5.8.  Patient was sent a lab letter.  I will go over the letter with her when she calls back.

## 2023-03-10 NOTE — TELEPHONE ENCOUNTER
Caller: Jacques Yeboah    Relationship: Self    Best call back number: [phone]    What test was performed: LABS    When was the test performed: 03/03/23    Where was the test performed: IN-OFFICE      PATIENT STATES A1C WAS DOWN THIS YEAR PER LAST, PATIENT STATES SHE WOULD LIKE TO SEE A DIETITIAN     PATIENT WAS WANTING TO KNOW HOW SHE CAN GET HCL DOWN  PATIENT STATES SHE'S ONLY AVAILABLE BEFORE 12 TODAY.

## 2023-03-10 NOTE — TELEPHONE ENCOUNTER
Went over lab letter with patient discussed eating habits.  She will call back if she decides to see a dietician.

## 2023-03-16 ENCOUNTER — TELEPHONE (OUTPATIENT)
Dept: INTERNAL MEDICINE | Facility: CLINIC | Age: 78
End: 2023-03-16
Payer: MEDICARE

## 2023-03-16 DIAGNOSIS — R73.09 ELEVATED GLUCOSE: Primary | ICD-10-CM

## 2023-03-16 NOTE — TELEPHONE ENCOUNTER
Caller: Jacques Yeboah    Relationship: Self    Best call back number: [phone]    What is the medical concern/diagnosis:A1C AND BLOOD SUGAR CONCERNS    What specialty or service is being requested: NUTRITIONIST     What is the provider, practice or medical service name: NO PREFERENCE     Any additional details: PATIENT WOULD FEEL BETTER WITH SEEING A NUTRITIONIST FOR HER SUGARS. FOR PERSONAL SATISFACTION SHE WOULD LIKE TO GO FORWARD WITH THIS.  SHE IS LIVING IN [facility] AND IS WANTING TO KNOW IF AN APPPOINTMENT WITH A NUTRITIONIST  CAN BE SCHEDULED. PLEASE CALL HER TO LET HER KNOW IF THIS WAS SCHEDULED AND WHEN. IF THERE ARE ANY QUESTIONS PLEASE CALL THE PATIENT.

## 2023-03-20 ENCOUNTER — OFFICE VISIT (OUTPATIENT)
Dept: ORTHOPEDIC SURGERY | Facility: CLINIC | Age: 78
End: 2023-03-20
Payer: MEDICARE

## 2023-03-20 VITALS
WEIGHT: 134 LBS | HEIGHT: 64 IN | DIASTOLIC BLOOD PRESSURE: 72 MMHG | SYSTOLIC BLOOD PRESSURE: 140 MMHG | BODY MASS INDEX: 22.88 KG/M2

## 2023-03-20 DIAGNOSIS — M17.11 OSTEOARTHRITIS OF RIGHT KNEE, UNSPECIFIED OSTEOARTHRITIS TYPE: Primary | ICD-10-CM

## 2023-03-20 PROCEDURE — 1159F MED LIST DOCD IN RCRD: CPT | Performed by: ORTHOPAEDIC SURGERY

## 2023-03-20 PROCEDURE — 1160F RVW MEDS BY RX/DR IN RCRD: CPT | Performed by: ORTHOPAEDIC SURGERY

## 2023-03-20 PROCEDURE — 20610 DRAIN/INJ JOINT/BURSA W/O US: CPT | Performed by: ORTHOPAEDIC SURGERY

## 2023-03-20 RX ORDER — TRIAMCINOLONE ACETONIDE 40 MG/ML
40 INJECTION, SUSPENSION INTRA-ARTICULAR; INTRAMUSCULAR
Status: COMPLETED | OUTPATIENT
Start: 2023-03-20 | End: 2023-03-20

## 2023-03-20 RX ORDER — ROPIVACAINE HYDROCHLORIDE 5 MG/ML
4 INJECTION, SOLUTION EPIDURAL; INFILTRATION; PERINEURAL
Status: COMPLETED | OUTPATIENT
Start: 2023-03-20 | End: 2023-03-20

## 2023-03-20 RX ORDER — NAPROXEN SODIUM 220 MG
220 TABLET ORAL 2 TIMES DAILY PRN
COMMUNITY

## 2023-03-20 RX ADMIN — TRIAMCINOLONE ACETONIDE 40 MG: 40 INJECTION, SUSPENSION INTRA-ARTICULAR; INTRAMUSCULAR at 16:05

## 2023-03-20 RX ADMIN — ROPIVACAINE HYDROCHLORIDE 4 ML: 5 INJECTION, SOLUTION EPIDURAL; INFILTRATION; PERINEURAL at 16:05

## 2023-03-20 NOTE — PROGRESS NOTES
Procedure   Large Joint Arthrocentesis: R knee  Date/Time: 3/20/2023 4:05 PM  Consent given by: patient  Site marked: site marked  Timeout: Immediately prior to procedure a time out was called to verify the correct patient, procedure, equipment, support staff and site/side marked as required   Supporting Documentation  Indications: pain   Procedure Details  Location: knee - R knee  Preparation: Patient was prepped and draped in the usual sterile fashion  Needle size: 22 G  Approach: anterolateral  Medications administered: 4 mL ropivacaine 0.5 %; 40 mg triamcinolone acetonide 40 MG/ML  Patient tolerance: patient tolerated the procedure well with no immediate complications

## 2023-03-20 NOTE — PROGRESS NOTES
[facility] Note    Subjective     Chief Complaint   Patient presents with   • Follow-up     6 week f/u Osteoarthritis of right knee          HPI    It has been 6  week(s) since Ms. Yeboah's last visit. She returns to clinic today for follow-up of right knee arthritis. The issue has been ongoing for 2 month(s). She rates her pain a 3/10 on the pain scale. Previous/current treatments: NSAIDS and ice. Current symptoms: same as prior visit. The pain is worse with rising from seated position and any movement of the joint; resting, ICE, and NSAIDS improve the pain. Overall, she is doing the same.  She would like to have an injection today.  This was discussed as a possibility on her last visit.    I have reviewed the following portions of the patient's history and agree with: History of Present Illness and Review of Systems    Patient Active Problem List   Diagnosis   • Hyperlipidemia   • Arthritis of right hip   • Status post total replacement of right hip   • Celiac disease   • Primary osteoarthritis of left hip   • Medical orders for life-sustaining treatment (MOLST) form in chart   • Upper respiratory tract infection   • Bronchitis     Past Medical History:   Diagnosis Date   • Arthritis    • Arthritis of back Long history   • Celiac disease    • Fracture of wrist Aug 21 2022    Boxer’s fravture   • H/O bone density study    • Hip pain    • History of colonoscopy     CSGA   • History of mammogram 08/13/2020   • Inflammatory arthritis    • Lumbago    • Lumbar degenerative disc disease    • Metatarsalgia, left foot    • Osteoarthritis of knees, bilateral    • Pelvic fracture (HCC)    • Primary osteoarthritis of both hips    • Sciatica    • Thoracic spondylosis       Past Surgical History:   Procedure Laterality Date   • COLONOSCOPY      CSGA   • EYE SURGERY     • FRACTURE SURGERY      left wrist; pelvis   • JOINT REPLACEMENT  2017   • TONSILLECTOMY     • TOTAL HIP ARTHROPLASTY Right  10/03/2017    Procedure: TOTAL HIP ARTHROPLASTY RIGHT;  Surgeon: Prem Hodges MD;  Location: [facility];  Service:    • TUBAL ABDOMINAL LIGATION        Family History   Problem Relation Age of Onset   • Heart failure Mother         CHF   • Osteoarthritis Mother    • Heart attack Father         Acute MI   • Stroke Father         Stroke syndrome   • Breast cancer Paternal Aunt         MID 70'S   • Ovarian cancer Neg Hx      Social History     Socioeconomic History   • Marital status:    Tobacco Use   • Smoking status: Never   • Smokeless tobacco: Never   Vaping Use   • Vaping Use: Never used   Substance and Sexual Activity   • Alcohol use: No   • Drug use: No   • Sexual activity: Not Currently     Comment: Pill for 9 years      Current Outpatient Medications on File Prior to Visit   Medication Sig Dispense Refill   • atorvastatin (LIPITOR) 10 MG tablet Take 1 tablet by mouth Daily. 90 tablet 1   • naproxen sodium (ALEVE) 220 MG tablet Take 1 tablet by mouth 2 (Two) Times a Day As Needed.     • [DISCONTINUED] clindamycin (CLEOCIN) 300 MG capsule 2 capsules 1 (One) Time. Before dental appointments.       No current facility-administered medications on file prior to visit.      No Known Allergies     Review of Systems   Constitutional: Negative for activity change, appetite change, chills, diaphoresis, fatigue, fever and unexpected weight change.   HENT: Negative for congestion, dental problem, drooling, ear discharge, ear pain, facial swelling, hearing loss, mouth sores, nosebleeds, postnasal drip, rhinorrhea, sinus pressure, sneezing, sore throat, tinnitus, trouble swallowing and voice change.    Eyes: Negative for photophobia, pain, discharge, redness, itching and visual disturbance.   Respiratory: Negative for apnea, cough, choking, chest tightness, shortness of breath, wheezing and stridor.    Cardiovascular: Negative for chest pain, palpitations and leg swelling.   Gastrointestinal: Negative for abdominal  "distention, abdominal pain, anal bleeding, blood in stool, constipation, diarrhea, nausea, rectal pain and vomiting.   Endocrine: Negative for cold intolerance, heat intolerance, polydipsia, polyphagia and polyuria.   Genitourinary: Negative for decreased urine volume, difficulty urinating, dysuria, enuresis, flank pain, frequency, genital sores, hematuria and urgency.   Musculoskeletal: Positive for arthralgias. Negative for back pain, gait problem, joint swelling, myalgias, neck pain and neck stiffness.   Skin: Negative for color change, pallor, rash and wound.   Allergic/Immunologic: Negative for environmental allergies, food allergies and immunocompromised state.   Neurological: Negative for dizziness, tremors, seizures, syncope, facial asymmetry, speech difficulty, weakness, light-headedness, numbness and headaches.   Hematological: Negative for adenopathy. Does not bruise/bleed easily.   Psychiatric/Behavioral: Negative for agitation, behavioral problems, confusion, decreased concentration, dysphoric mood, hallucinations, self-injury, sleep disturbance and suicidal ideas. The patient is not nervous/anxious and is not hyperactive.         Objective      Physical Exam  /72   Ht 161.5 cm (63.58\")   Wt 60.8 kg (134 lb)   LMP  (LMP Unknown)   BMI 23.30 kg/m²     Body mass index is 23.3 kg/m².    General:   Mental Status:  Alert   Appearance: Cooperative, in no acute distress   Build and Nutrition: Well-nourished female   Orientation: Alert and oriented to person, place and time   Posture: Normal   Gait: Nonantalgic    Lower Extremities:              Right Knee:                          Tenderness:    Lateral joint line tenderness                          Effusion:          None                          Swelling:          None                          Crepitus:          Positive                          Atrophy:           None                          Range of motion:        Extension:       0°             "                                                  Flexion:           120°  Instability:        No varus laxity, no valgus laxity, negative anterior drawer  Deformities:     None    Imaging/Studies  Imaging Results (Last 24 Hours)     ** No results found for the last 24 hours. **        No new imaging today.      Assessment and Plan     Diagnoses and all orders for this visit:    1. Osteoarthritis of right knee, unspecified osteoarthritis type (Primary)  -     Large Joint Arthrocentesis: R knee        1. Osteoarthritis of right knee, unspecified osteoarthritis type        I reviewed my findings with the patient.  She decided to proceed with an injection, and this was provided today.  I will see her back in 3 months, but sooner for any problems.    Procedure Note:  The potential benefits of performing a therapeutic right knee joint injection, as well as potential risks (including, but not limited to infection, swelling, pain, bleeding, bruising, nerve/blood vessel damage, skin color changes, transient elevation in blood glucose levels, and fat atrophy) were discussed with the patient.  After informed consent, timeout procedure was performed, and the skin on the right knee was prepped with chlorhexidine soap and alcohol, after which ethyl chloride was applied to the skin at the injection site. Via the anterolateral approach, 1ml of Kenalog 40mg/ml mixed with 4ml 0.5% ropivacaine plain was injected into the knee joint.  The patient tolerated the procedure well, experiencing 100% improvement a few minutes following the injection. There were no complications.  Band-Aid was applied to the injection site. Post-procedural instructions were given to the patient and/or their caregiver.      Return in about 3 months (around 6/20/2023).      Prem Hodges MD  03/20/23  16:35 EDT

## 2023-05-22 ENCOUNTER — HOSPITAL ENCOUNTER (OUTPATIENT)
Dept: NUTRITION | Facility: HOSPITAL | Age: 78
Setting detail: RECURRING SERIES
Discharge: HOME | End: 2023-05-22

## 2023-05-22 PROCEDURE — 97802 MEDICAL NUTRITION INDIV IN: CPT

## 2023-05-25 NOTE — CONSULTS
[facility] Nutrition Services          Initial 60 Minute Nutrition Visit    Date: 2023   Patient Name: Jacques Yeboah  : 1945   MRN: [id number]   Referring Provider: Zulma Ghotra MD    Reason for Visit: Elevated Blood Glucose  Visit Format: In Person    Nutrition Assessment       Social History:   Social History     Socioeconomic History   • Marital status:    Tobacco Use   • Smoking status: Never   • Smokeless tobacco: Never   Vaping Use   • Vaping Use: Never used   Substance and Sexual Activity   • Alcohol use: No   • Drug use: No   • Sexual activity: Not Currently     Comment: Pill for 9 years     Active Problem List:   Patient Active Problem List    Diagnosis    • Bronchitis [J40]    • Upper respiratory tract infection [J06.9]    • Medical orders for life-sustaining treatment (MOLST) form in chart [Z78.9]    • Primary osteoarthritis of left hip [M16.12]    • Celiac disease [K90.0]    • Arthritis of right hip [M16.11]    • Status post total replacement of right hip [Z96.641]    • Hyperlipidemia [E78.5]       Current Medications:   Current Outpatient Medications:   •  atorvastatin (LIPITOR) 10 MG tablet, Take 1 tablet by mouth Daily., Disp: 90 tablet, Rfl: 1  •  naproxen sodium (ALEVE) 220 MG tablet, Take 1 tablet by mouth 2 (Two) Times a Day As Needed., Disp: , Rfl:     Labs: 3/3/23: MrtW5W-2.8, Na-137, TRI-54, HDL-87 high, LDL-58, TP-7.1, Albumin-4.4, Chol-156, Tri-54, HDL-87 high, LDL-58    Hunger Vital Sign Food Insecurity Assessment:  Within the past 12 months I/we worried whether our food would run out before I/we got money to buy more: Not disclosed   Within the past 12 months the food I/we bought just didn't last and I/we didn't have money to get more: Not disclosed   Use of food assistance programs (WIC, food stamps, food art) None       Food & Nutrition Related History       Food Allergies: Sensitive to Gluten but not diagnosed w/ Celiac disease  Food  "Intolerances: Gluten sensitive  Details at home: Resident lives at an Assisted Living Facility  Who prepares most meals: Meals are prepared by staff  How many meals are purchased from fast food/sit down restaurants per week: Doesn't eat out often  Difficulty chewing: None  Difficulty swallowing: None  History of eating disorder/disordered eating habits: None  Language/communication details: English  Barriers to learning: No barriers identified at this time    24 Hour Recall:   Time Food/beverages consumed   7 am Gluten free cereal, yogurt w/ no sugar, fresh fruit   12 pm Broccoli soup w/ onions/carrots   Dinner meal Chicken sandwich, cucumbers, watermelon, french fries                         Additional comments: Patient drinks most water w/ sugar free flavoring, 1% milk, Coke Zero or diet coke    Anthropometrics      Height:   Ht Readings from Last 1 Encounters:   03/20/23 161.5 cm (63.58\")     Weight:   Wt Readings from Last 3 Encounters:   03/20/23 60.8 kg (134 lb)   03/03/23 60.8 kg (134 lb)   02/06/23 62.1 kg (136 lb 12.8 oz)     BMI: There is no height or weight on file to calculate BMI.   Weight Change: Not disclosed    Physical Activity     Activity  Frequency Duration                                         Barriers to physical activity: None    Physical activity comments: Walks at least 20 mins/day and exercises with assisted living accommodations-3 lb weight lifting    Estimated Needs     Estimated Energy Needs: ~ 1,481 to 1,696 kcals depending on activity level    Estimated Protein Needs: 49 to 61 gms    Discussion / Education      Patient was seen today for referral for elevated glucose. Patient was a very pleasant female that had a good base knowledge of good nutrition and activity. She is very eager and motivated to keep her blood sugars and HgbA1C within normal limits. She does not want to take any medication. Patient lives in an assisted living facility that has set menus that patient's can choose " or change items based upon their preference. Patient reports she eliminates items that she shouldn't have and replaces them with healthier items. Patient brought several food items that she enjoys on a regular basis along with a sample menu from her facility for me to analyze and provide feedback. RD discussed Prediabetes basics, CHO  Counting, eating more portion controlled balanced meals and used The Plate Method tool for a foundation. RD discussed the importance of eating 3 healthy, balanced meals that are portion controlled and healthy snacks 1 to 2 times per day that are high in fiber and low in fat and CHO's. RD discussed increasing more protein into diet and snacks. RD discussed physical activity and incorporating more light weights into daily activity as long as MD improved.     Assessment of patient engagement: Engaged    Measurement of understanding: Patient verbalized understanding    Resources Provided:     Prediabetes Basics Booklet  [employer] Planning Healthy meals (CHO counting)  Healthy Snack Ideas  High Fiber Snacks and Foods  The Plate Method  Diabetic Snack Ideas  The 10 minute workout    Goal (s)      Goal 1: 30 to 45 gms of Fiber per meal    Goal 2: Incorporating more exercise with light weights or resistance bands      Plan of Care     PES Statement:   Food and nutrition related to knowledge deficit related to no prior nutrition education as evidenced by need for education on lowering blood Glucose.     Follow Up Visit      Follow Up:     Patient will call to request follow-up if she feels she needs one. She felt she was provided with good education and tools before leaving.    Total of 60  minutes spent with patient on nutrition counseling. Education based on Academy of Nutrition and Dietetics guidelines. Patient was provided with RD's contact information. Thank you for this referral.

## 2023-05-25 NOTE — ADDENDUM NOTE
Encounter addended by: Mary Soliman RD on: 5/25/2023 5:35 PM   Actions taken: Clinical Note Signed, Charge Capture section accepted

## 2023-06-02 ENCOUNTER — OFFICE VISIT (OUTPATIENT)
Dept: INTERNAL MEDICINE | Facility: CLINIC | Age: 78
End: 2023-06-02
Payer: MEDICARE

## 2023-06-02 VITALS
SYSTOLIC BLOOD PRESSURE: 114 MMHG | BODY MASS INDEX: 22.88 KG/M2 | DIASTOLIC BLOOD PRESSURE: 58 MMHG | HEIGHT: 64 IN | TEMPERATURE: 97.8 F | WEIGHT: 134 LBS | HEART RATE: 73 BPM | OXYGEN SATURATION: 97 %

## 2023-06-02 DIAGNOSIS — J06.9 UPPER RESPIRATORY TRACT INFECTION, UNSPECIFIED TYPE: Primary | ICD-10-CM

## 2023-06-02 DIAGNOSIS — R09.81 NASAL CONGESTION: ICD-10-CM

## 2023-06-02 PROCEDURE — 1159F MED LIST DOCD IN RCRD: CPT | Performed by: STUDENT IN AN ORGANIZED HEALTH CARE EDUCATION/TRAINING PROGRAM

## 2023-06-02 PROCEDURE — 1160F RVW MEDS BY RX/DR IN RCRD: CPT | Performed by: STUDENT IN AN ORGANIZED HEALTH CARE EDUCATION/TRAINING PROGRAM

## 2023-06-02 PROCEDURE — 99213 OFFICE O/P EST LOW 20 MIN: CPT | Performed by: STUDENT IN AN ORGANIZED HEALTH CARE EDUCATION/TRAINING PROGRAM

## 2023-06-02 RX ORDER — AZITHROMYCIN 250 MG/1
TABLET, FILM COATED ORAL
Qty: 6 TABLET | Refills: 0 | Status: SHIPPED | OUTPATIENT
Start: 2023-06-02

## 2023-06-02 RX ORDER — FLUTICASONE PROPIONATE 50 MCG
2 SPRAY, SUSPENSION (ML) NASAL DAILY
Qty: 15.8 ML | Refills: 0 | Status: SHIPPED | OUTPATIENT
Start: 2023-06-02

## 2023-06-02 RX ORDER — METHYLPREDNISOLONE 4 MG/1
TABLET ORAL
Qty: 21 TABLET | Refills: 0 | Status: SHIPPED | OUTPATIENT
Start: 2023-06-02

## 2023-06-02 NOTE — PROGRESS NOTES
Office Note     Name: Jacques Yeboah    : 1945     MRN: [id number]     Chief Complaint  Nasal Congestion and Headache (Head congestion )    Subjective     History of Present Illness:  Jacques Yeboah is a 78 y.o. female who presents today for     Upper Respiratory Infection  Patient complains of symptoms of a URI. Symptoms include congestion, nasal congestion and post nasal drip. Onset of symptoms was 2 days ago, and has been stable since that time. Treatment to date: has tried using afrin.          Review of Systems:   Review of Systems   All other systems reviewed and are negative.    Past Medical History:   Past Medical History:   Diagnosis Date    Arthritis     Arthritis of back Long history    Celiac disease     Fracture of wrist Aug 21 2022    Boxer’s fravture    H/O bone density study     Hip pain     History of colonoscopy     CSGA    History of mammogram 2020    Inflammatory arthritis     Lumbago     Lumbar degenerative disc disease     Metatarsalgia, left foot     Osteoarthritis of knees, bilateral     Pelvic fracture     Primary osteoarthritis of both hips     Sciatica     Thoracic spondylosis        Past Surgical History:   Past Surgical History:   Procedure Laterality Date    COLONOSCOPY      CSGA    EYE SURGERY      FRACTURE SURGERY      left wrist; pelvis    JOINT REPLACEMENT  2017    TONSILLECTOMY      TOTAL HIP ARTHROPLASTY Right 10/03/2017    Procedure: TOTAL HIP ARTHROPLASTY RIGHT;  Surgeon: Prem Hodges MD;  Location: [facility];  Service:     TUBAL ABDOMINAL LIGATION         Family History:   Family History   Problem Relation Age of Onset    Heart failure Mother         CHF    Osteoarthritis Mother     Heart attack Father         Acute MI    Stroke Father         Stroke syndrome    Breast cancer Paternal Aunt         MID 70'S    Ovarian cancer Neg Hx        Social History:   Social History     Socioeconomic History    Marital status:    Tobacco Use    Smoking  "status: Never    Smokeless tobacco: Never   Vaping Use    Vaping Use: Never used   Substance and Sexual Activity    Alcohol use: No    Drug use: No    Sexual activity: Not Currently     Comment: Pill for 9 years       Immunizations:   Immunization History   Administered Date(s) Administered    Fluad Quad 65+ 10/06/2021    Fluzone High Dose =>65 Years ([employer] ONLY) 10/19/2016, 09/13/2017, 10/11/2018, 10/09/2019, 09/15/2020    Fluzone High-Dose 65+yrs 09/16/2020, 10/08/2022    Hepatitis A 11/07/2018    Pneumococcal Conjugate 13-Valent (PCV13) 06/13/2017    Pneumococcal Polysaccharide (PPSV23) 01/24/2020    Shingrix 06/20/2019, 11/06/2019    Tdap 08/21/2022        Medications:     Current Outpatient Medications:     atorvastatin (LIPITOR) 10 MG tablet, Take 1 tablet by mouth Daily., Disp: 90 tablet, Rfl: 1    naproxen sodium (ALEVE) 220 MG tablet, Take 1 tablet by mouth 2 (Two) Times a Day As Needed., Disp: , Rfl:     azithromycin (Zithromax Z-Romel) 250 MG tablet, Take 2 tablets by mouth on day 1, then 1 tablet daily on days 2-5, Disp: 6 tablet, Rfl: 0    fluticasone (FLONASE) 50 MCG/ACT nasal spray, 2 sprays into the nostril(s) as directed by provider Daily., Disp: 15.8 mL, Rfl: 0    methylPREDNISolone (MEDROL) 4 MG dose pack, Take as directed on package instructions., Disp: 21 tablet, Rfl: 0    Allergies:   No Known Allergies    Objective     Vital Signs  /58   Pulse 73   Temp 97.8 °F (36.6 °C) (Temporal)   Ht 161.5 cm (63.58\")   Wt 60.8 kg (134 lb)   SpO2 97%   BMI 23.30 kg/m²   Estimated body mass index is 23.3 kg/m² as calculated from the following:    Height as of this encounter: 161.5 cm (63.58\").    Weight as of this encounter: 60.8 kg (134 lb).    BMI is within normal parameters. No other follow-up for BMI required.      Physical Exam  Constitutional:       Appearance: Normal appearance.   HENT:      Nose: Congestion present.   Cardiovascular:      Pulses: Normal pulses.      Heart sounds: Normal " heart sounds. No murmur heard.  Pulmonary:      Effort: Pulmonary effort is normal.      Breath sounds: Normal breath sounds.   Neurological:      Mental Status: She is alert.   Psychiatric:         Behavior: Behavior is cooperative.        Result Review :                  Assessment and Plan     1. Upper respiratory tract infection, unspecified type    - methylPREDNISolone (MEDROL) 4 MG dose pack; Take as directed on package instructions.  Dispense: 21 tablet; Refill: 0  - azithromycin (Zithromax Z-Romel) 250 MG tablet; Take 2 tablets by mouth on day 1, then 1 tablet daily on days 2-5  Dispense: 6 tablet; Refill: 0  - fluticasone (FLONASE) 50 MCG/ACT nasal spray; 2 sprays into the nostril(s) as directed by provider Daily.  Dispense: 15.8 mL; Refill: 0    2. Nasal congestion    - methylPREDNISolone (MEDROL) 4 MG dose pack; Take as directed on package instructions.  Dispense: 21 tablet; Refill: 0  - azithromycin (Zithromax Z-Romel) 250 MG tablet; Take 2 tablets by mouth on day 1, then 1 tablet daily on days 2-5  Dispense: 6 tablet; Refill: 0  - fluticasone (FLONASE) 50 MCG/ACT nasal spray; 2 sprays into the nostril(s) as directed by provider Daily.  Dispense: 15.8 mL; Refill: 0       Follow Up  No follow-ups on file.    Solis Ruelas MD  [facility]  [facility]  [facility]  [address] [address] [phone]

## 2023-06-09 RX ORDER — FLUTICASONE PROPIONATE 50 MCG
SPRAY, SUSPENSION (ML) NASAL
Qty: 48 G | OUTPATIENT
Start: 2023-06-09

## 2023-07-28 ENCOUNTER — TRANSCRIBE ORDERS (OUTPATIENT)
Dept: ADMINISTRATIVE | Facility: HOSPITAL | Age: 78
End: 2023-07-28
Payer: MEDICARE

## 2023-07-28 DIAGNOSIS — Z12.31 SCREENING MAMMOGRAM, ENCOUNTER FOR: Primary | ICD-10-CM

## 2023-07-28 RX ORDER — ATORVASTATIN CALCIUM 10 MG/1
10 TABLET, FILM COATED ORAL DAILY
Qty: 90 TABLET | Refills: 1 | Status: SHIPPED | OUTPATIENT
Start: 2023-07-28

## 2023-07-28 NOTE — TELEPHONE ENCOUNTER
Caller: Jacques Yeboah    Relationship: Self    Best call back number: [phone]     Requested Prescriptions:   Requested Prescriptions     Pending Prescriptions Disp Refills    atorvastatin (LIPITOR) 10 MG tablet 90 tablet 1     Sig: Take 1 tablet by mouth Daily.        Pharmacy where request should be sent: [id number] [address] AT [facility] [phone] [facility] [phone] FX     Last office visit with prescribing clinician: 3/3/2023   Last telemedicine visit with prescribing clinician: Visit date not found   Next office visit with prescribing clinician: 3/14/2024     Additional details provided by patient:     Does the patient have less than a 3 day supply:  [] Yes  [x] No    Would you like a call back once the refill request has been completed: [] Yes [x] No    If the office needs to give you a call back, can they leave a voicemail: [] Yes [x] No    Todd Garcia   07/28/23 09:08 EDT

## 2023-09-07 ENCOUNTER — TELEPHONE (OUTPATIENT)
Dept: INTERNAL MEDICINE | Facility: CLINIC | Age: 78
End: 2023-09-07
Payer: MEDICARE

## 2023-09-07 NOTE — TELEPHONE ENCOUNTER
Patient has been notified and verbalized understanding.  She will call back to make an appointment.

## 2023-09-07 NOTE — TELEPHONE ENCOUNTER
Caller: Jacques Yeboah    Relationship: Self    Best call back number: [phone]    What orders are you requesting (i.e. lab or imaging): A1C LAB WORK    In what timeframe would the patient need to come in: ASAP    Where will you receive your lab/imaging services: IN OFFICE    Additional notes: PATIENT WANTS TO CHECK HER A1C BEFORE HER ANNUAL APPT PLEASE

## 2023-09-07 NOTE — TELEPHONE ENCOUNTER
Patient would like to do a lab to check her A1C.  She has been trying to eat better and just wanted to know.  Can you order this?  She was last seen in March 2023.

## 2023-09-29 ENCOUNTER — OFFICE VISIT (OUTPATIENT)
Dept: INTERNAL MEDICINE | Facility: CLINIC | Age: 78
End: 2023-09-29
Payer: MEDICARE

## 2023-09-29 VITALS
WEIGHT: 137 LBS | HEART RATE: 68 BPM | TEMPERATURE: 97.3 F | SYSTOLIC BLOOD PRESSURE: 110 MMHG | HEIGHT: 64 IN | DIASTOLIC BLOOD PRESSURE: 62 MMHG | OXYGEN SATURATION: 96 % | BODY MASS INDEX: 23.39 KG/M2

## 2023-09-29 DIAGNOSIS — R73.09 ELEVATED GLUCOSE: Primary | ICD-10-CM

## 2023-09-29 PROBLEM — J40 BRONCHITIS: Status: RESOLVED | Noted: 2022-12-07 | Resolved: 2023-09-29

## 2023-09-29 LAB
EXPIRATION DATE: NORMAL
HBA1C MFR BLD: 5.5 %
Lab: NORMAL

## 2023-09-29 NOTE — PROGRESS NOTES
"Chief Complaint  Hyperglycemia (Check A1C today.)    Subjective          Jacques Yeboah presents to [facility]  History of Present Illness    Elevated glucose-patient has slightly elevated glucose.  It was 109 when we checked 6 months ago and her A1c was 5.8.  She wanted to recheck today  She has celiac disease  She does walk regularly  Meals are provided where she lives so does not have as much control  Wt is stable  She did meet w/ nutritionist which was very helpful and has tried to modify diet    Skin lesion-Dr. Spring have referred her to dermatology 3 months ago for possible basal cell and she did see Dr Donohue and had it biopsied and getting a radiation    She saw Dr. Moore earlier this year and had an echo and was told everything looked stable    Current Outpatient Medications:     atorvastatin (LIPITOR) 10 MG tablet, Take 1 tablet by mouth Daily., Disp: 90 tablet, Rfl: 1    Calcium Carb-Cholecalciferol (CALCIUM 600 + D PO), Take 1 capsule by mouth 2 (Two) Times a Day., Disp: , Rfl:     cephalexin (Keflex) 500 MG capsule, Take 1 capsule by mouth 2 (Two) Times a Day., Disp: 30 capsule, Rfl: 0    mupirocin (BACTROBAN) 2 % ointment, Apply 1 application topically to the appropriate area as directed 3 (Three) Times a Day., Disp: 22 g, Rfl: 0   The following portions of the patient's history were reviewed and updated as appropriate: allergies, current medications, past family history, past medical history, past social history, past surgical history and problem list.     Objective   Vital Signs:   /62 (BP Location: Left arm, Patient Position: Sitting)   Pulse 68   Temp 97.3 °F (36.3 °C) (Infrared)   Ht 162.6 cm (64\")   Wt 62.1 kg (137 lb)   SpO2 96%   BMI 23.52 kg/m²       Physical exam  Constitutional: oriented to person, place, and time.  well-developed and well-nourished. No distress.   HENT:   Head: Normocephalic and atraumatic.   Eyes: Conjunctivae and EOM " are normal.   Cardiovascular: Normal rate, regular rhythm and normal heart sounds.  Exam reveals no gallop and no friction rub.  1/6 sys murmur  Pulmonary/Chest: Effort normal and breath sounds normal. No respiratory distress.   no wheezes.   Neurological:  alert and oriented to person, place, and time.   Skin: Skin is warm and dry. not diaphoretic.   Psychiatric:  normal mood and affect. behavior is normal. Judgment and thought content normal.      Result Review :   The following data was reviewed by: Zulma Ghotra MD on 09/29/2023:  CMP          3/3/2023    09:52   CMP   Glucose 109    BUN 18    Creatinine 0.72    EGFR 85.2    Sodium 137    Potassium 4.7    Chloride 98    Calcium 9.7    Total Protein 7.1    Albumin 4.4    Globulin 2.7    Total Bilirubin 0.5    Alkaline Phosphatase 71    AST (SGOT) 24    ALT (SGPT) 20    Albumin/Globulin Ratio 1.6    BUN/Creatinine Ratio 25.0    Anion Gap 9.2      CBC          3/3/2023    09:52   CBC   WBC 4.40    RBC 4.44    Hemoglobin 13.9    Hematocrit 42.0    MCV 94.6    MCH 31.3    MCHC 33.1    RDW 12.4    Platelets 247      Lipid Panel          3/3/2023    09:52   Lipid Panel   Total Cholesterol 156    Triglycerides 54    HDL Cholesterol 87    VLDL Cholesterol 11    LDL Cholesterol  58    LDL/HDL Ratio 0.67      TSH          3/3/2023    09:52   TSH   TSH 1.930      A1C Last 3 Results          3/3/2023    09:52   HGBA1C Last 3 Results   Hemoglobin A1C 5.80                  Assessment and Plan    Diagnoses and all orders for this visit:    1. Elevated glucose (Primary)  -     POC Glycosylated Hemoglobin (Hb A1C)    A1c looks great today. We can recheck in 3/24    Prev: she will get her flu vaccine at the pharmacy.  She does not want to get the COVID vaccine      Follow Up   No follow-ups on file.  Patient was given instructions and counseling regarding her condition or for health maintenance advice. Please see specific information pulled into the AVS if appropriate.

## 2023-10-01 ENCOUNTER — HOSPITAL ENCOUNTER (EMERGENCY)
Facility: HOSPITAL | Age: 78
Discharge: HOME | End: 2023-10-02
Attending: EMERGENCY MEDICINE | Admitting: EMERGENCY MEDICINE
Payer: MEDICARE

## 2023-10-01 ENCOUNTER — APPOINTMENT (OUTPATIENT)
Dept: GENERAL RADIOLOGY | Facility: HOSPITAL | Age: 78
End: 2023-10-01
Payer: MEDICARE

## 2023-10-01 ENCOUNTER — APPOINTMENT (OUTPATIENT)
Dept: CT IMAGING | Facility: HOSPITAL | Age: 78
End: 2023-10-01
Payer: MEDICARE

## 2023-10-01 DIAGNOSIS — R51.9 GENERALIZED HEADACHE: ICD-10-CM

## 2023-10-01 DIAGNOSIS — R42 DIZZINESS: ICD-10-CM

## 2023-10-01 DIAGNOSIS — R53.83 MALAISE AND FATIGUE: ICD-10-CM

## 2023-10-01 DIAGNOSIS — R03.0 ELEVATED BLOOD PRESSURE READING WITHOUT DIAGNOSIS OF HYPERTENSION: Primary | ICD-10-CM

## 2023-10-01 DIAGNOSIS — Z86.39 HISTORY OF HYPERLIPIDEMIA: ICD-10-CM

## 2023-10-01 DIAGNOSIS — R53.81 MALAISE AND FATIGUE: ICD-10-CM

## 2023-10-01 LAB
ALBUMIN SERPL-MCNC: 4.5 G/DL (ref 3.5–5.2)
ALBUMIN/GLOB SERPL: 1.6 G/DL
ALP SERPL-CCNC: 70 U/L (ref 39–117)
ALT SERPL W P-5'-P-CCNC: 15 U/L (ref 1–33)
ANION GAP SERPL CALCULATED.3IONS-SCNC: 8 MMOL/L (ref 5–15)
AST SERPL-CCNC: 18 U/L (ref 1–32)
BACTERIA UR QL AUTO: NORMAL /HPF
BASOPHILS # BLD AUTO: 0.02 10*3/MM3 (ref 0–0.2)
BASOPHILS NFR BLD AUTO: 0.4 % (ref 0–1.5)
BILIRUB SERPL-MCNC: 0.5 MG/DL (ref 0–1.2)
BILIRUB UR QL STRIP: NEGATIVE
BUN SERPL-MCNC: 16 MG/DL (ref 8–23)
BUN/CREAT SERPL: 23.2 (ref 7–25)
CALCIUM SPEC-SCNC: 9.6 MG/DL (ref 8.6–10.5)
CHLORIDE SERPL-SCNC: 102 MMOL/L (ref 98–107)
CLARITY UR: CLEAR
CO2 SERPL-SCNC: 31 MMOL/L (ref 22–29)
COLOR UR: YELLOW
CREAT SERPL-MCNC: 0.69 MG/DL (ref 0.57–1)
DEPRECATED RDW RBC AUTO: 47.1 FL (ref 37–54)
EGFRCR SERPLBLD CKD-EPI 2021: 88.4 ML/MIN/1.73
EOSINOPHIL # BLD AUTO: 0.05 10*3/MM3 (ref 0–0.4)
EOSINOPHIL NFR BLD AUTO: 1 % (ref 0.3–6.2)
ERYTHROCYTE [DISTWIDTH] IN BLOOD BY AUTOMATED COUNT: 13.1 % (ref 12.3–15.4)
FLUAV SUBTYP SPEC NAA+PROBE: NOT DETECTED
FLUBV RNA ISLT QL NAA+PROBE: NOT DETECTED
GLOBULIN UR ELPH-MCNC: 2.8 GM/DL
GLUCOSE SERPL-MCNC: 116 MG/DL (ref 65–99)
GLUCOSE UR STRIP-MCNC: NEGATIVE MG/DL
HCT VFR BLD AUTO: 43 % (ref 34–46.6)
HGB BLD-MCNC: 13.9 G/DL (ref 12–15.9)
HGB UR QL STRIP.AUTO: NEGATIVE
HOLD SPECIMEN: NORMAL
HYALINE CASTS UR QL AUTO: NORMAL /LPF
IMM GRANULOCYTES # BLD AUTO: 0.01 10*3/MM3 (ref 0–0.05)
IMM GRANULOCYTES NFR BLD AUTO: 0.2 % (ref 0–0.5)
KETONES UR QL STRIP: NEGATIVE
LEUKOCYTE ESTERASE UR QL STRIP.AUTO: ABNORMAL
LYMPHOCYTES # BLD AUTO: 1.29 10*3/MM3 (ref 0.7–3.1)
LYMPHOCYTES NFR BLD AUTO: 26.2 % (ref 19.6–45.3)
MAGNESIUM SERPL-MCNC: 2.8 MG/DL (ref 1.6–2.4)
MCH RBC QN AUTO: 31.4 PG (ref 26.6–33)
MCHC RBC AUTO-ENTMCNC: 32.3 G/DL (ref 31.5–35.7)
MCV RBC AUTO: 97.3 FL (ref 79–97)
MONOCYTES # BLD AUTO: 0.41 10*3/MM3 (ref 0.1–0.9)
MONOCYTES NFR BLD AUTO: 8.3 % (ref 5–12)
NEUTROPHILS NFR BLD AUTO: 3.15 10*3/MM3 (ref 1.7–7)
NEUTROPHILS NFR BLD AUTO: 63.9 % (ref 42.7–76)
NITRITE UR QL STRIP: NEGATIVE
NRBC BLD AUTO-RTO: 0 /100 WBC (ref 0–0.2)
PH UR STRIP.AUTO: 7.5 [PH] (ref 5–8)
PLATELET # BLD AUTO: 234 10*3/MM3 (ref 140–450)
PMV BLD AUTO: 9.5 FL (ref 6–12)
POTASSIUM SERPL-SCNC: 4.6 MMOL/L (ref 3.5–5.2)
PROT SERPL-MCNC: 7.3 G/DL (ref 6–8.5)
PROT UR QL STRIP: NEGATIVE
RBC # BLD AUTO: 4.42 10*6/MM3 (ref 3.77–5.28)
RBC # UR STRIP: NORMAL /HPF
REF LAB TEST METHOD: NORMAL
SARS-COV-2 RNA RESP QL NAA+PROBE: NOT DETECTED
SODIUM SERPL-SCNC: 141 MMOL/L (ref 136–145)
SP GR UR STRIP: 1.01 (ref 1–1.03)
SQUAMOUS #/AREA URNS HPF: NORMAL /HPF
TROPONIN T SERPL HS-MCNC: 11 NG/L
TSH SERPL DL<=0.05 MIU/L-ACNC: 2.04 UIU/ML (ref 0.27–4.2)
UROBILINOGEN UR QL STRIP: ABNORMAL
WBC # UR STRIP: NORMAL /HPF
WBC NRBC COR # BLD: 4.93 10*3/MM3 (ref 3.4–10.8)
WHOLE BLOOD HOLD COAG: NORMAL
WHOLE BLOOD HOLD SPECIMEN: NORMAL

## 2023-10-01 PROCEDURE — 81001 URINALYSIS AUTO W/SCOPE: CPT | Performed by: EMERGENCY MEDICINE

## 2023-10-01 PROCEDURE — 83735 ASSAY OF MAGNESIUM: CPT | Performed by: EMERGENCY MEDICINE

## 2023-10-01 PROCEDURE — 71045 X-RAY EXAM CHEST 1 VIEW: CPT

## 2023-10-01 PROCEDURE — 84484 ASSAY OF TROPONIN QUANT: CPT | Performed by: EMERGENCY MEDICINE

## 2023-10-01 PROCEDURE — 36415 COLL VENOUS BLD VENIPUNCTURE: CPT

## 2023-10-01 PROCEDURE — 99284 EMERGENCY DEPT VISIT MOD MDM: CPT

## 2023-10-01 PROCEDURE — 84443 ASSAY THYROID STIM HORMONE: CPT | Performed by: PHYSICIAN ASSISTANT

## 2023-10-01 PROCEDURE — 70450 CT HEAD/BRAIN W/O DYE: CPT

## 2023-10-01 PROCEDURE — 80053 COMPREHEN METABOLIC PANEL: CPT | Performed by: EMERGENCY MEDICINE

## 2023-10-01 PROCEDURE — 85025 COMPLETE CBC W/AUTO DIFF WBC: CPT | Performed by: EMERGENCY MEDICINE

## 2023-10-01 PROCEDURE — 93005 ELECTROCARDIOGRAM TRACING: CPT

## 2023-10-01 PROCEDURE — 87636 SARSCOV2 & INF A&B AMP PRB: CPT | Performed by: PHYSICIAN ASSISTANT

## 2023-10-01 PROCEDURE — 93005 ELECTROCARDIOGRAM TRACING: CPT | Performed by: EMERGENCY MEDICINE

## 2023-10-01 RX ORDER — SODIUM CHLORIDE 0.9 % (FLUSH) 0.9 %
10 SYRINGE (ML) INJECTION AS NEEDED
Status: DISCONTINUED | OUTPATIENT
Start: 2023-10-01 | End: 2023-10-02 | Stop reason: HOSPADM

## 2023-10-01 RX ORDER — LISINOPRIL 10 MG/1
10 TABLET ORAL ONCE
Status: COMPLETED | OUTPATIENT
Start: 2023-10-01 | End: 2023-10-01

## 2023-10-01 RX ADMIN — LISINOPRIL 10 MG: 10 TABLET ORAL at 22:52

## 2023-10-01 NOTE — ED PROVIDER NOTES
Subjective   History of Present Illness  This is a 78-year-old female that presents the ER with elevated blood pressure without personal history of hypertension x2 days.  At the highest, blood pressure was 180/101 at home.  Patient says that she was just recently at her PCP approximately 4 days ago for a routine visit on her hemoglobin A1c.  She said her systolic blood pressure was normal at 110.  She said that the last couple of days that she has had intermittent facial flushing, mild occipital headache, and some intermittent dizziness.  She denies any chest pain or shortness of breath.  She denies any speech changes or unilateral weakness/numbness/tingling.  She denies any recent URI symptoms including rhinorrhea, nasal congestion, or cough.  She denies any tobacco abuse.  She denies OTC herbal supplements or decongestants.  Patient became concerned and decided to come to the ER for further assessment.  Patient also of note reports having history of basal cell carcinoma along her lower abdominal region.  She is following with dermatology and is almost done with treatment.  Past medical history is significant for celiac disease, sciatica, lumbar degenerative disc disease, history of pelvic fractures, and hyperlipidemia.  Patient only takes daily Lipitor.  No other medications routinely are taken.    History provided by:  Patient  Hypertension  Duration:  2 days  Chronicity:  New  Notable PTA blood pressures:  180/101  Context: stress (Increased anxiety regarding elevated blood pressure)    Context: not caffeine, not herbal remedies, not medication change and not OTC medications used    Context comment:  Recent routine PCP visit for f/u on HgbA1c 4 days ago.  BP was normal at 110 systolic at the office. 2 day h/o elvated BP.  Face felt flushed, mild occipital HA and some dizziness.  No CP/SOA. No recent URI sxs.  Relieved by:  Nothing  Worsened by:  Nothing  Ineffective treatments:  None tried  Associated symptoms:  anxiety, dizziness, fatigue and headaches (mild occipital HA)    Associated symptoms: no abdominal pain, no blurred vision, no chest pain, no confusion, no ear pain, no epistaxis, no fever, no hematuria, no hypokalemia, no loss of consciousness, no nausea, no neck pain, no palpitations, no peripheral edema, no shortness of breath, no syncope, no tinnitus, not vomiting and no weakness    Risk factors: no alcohol use, no cardiac disease, no decongestant use, no prior stroke and no tobacco use    Risk factors comment:  No personal history of hypertension. Pt says that she is doing some minor treatment for basal cell carcinoma on the panty line per Dermatology.    Review of Systems   Constitutional:  Positive for activity change and fatigue. Negative for appetite change, chills, diaphoresis and fever.   HENT:  Negative for congestion, ear pain, nosebleeds and tinnitus.    Eyes: Negative.  Negative for blurred vision and visual disturbance.   Respiratory: Negative.  Negative for cough and shortness of breath.    Cardiovascular: Negative.  Negative for chest pain, palpitations, leg swelling and syncope.   Gastrointestinal: Negative.  Negative for abdominal pain, nausea and vomiting.   Genitourinary: Negative.  Negative for dysuria, flank pain, frequency, hematuria and urgency.   Musculoskeletal: Negative.  Negative for back pain and neck pain.   Neurological:  Positive for dizziness and headaches (mild occipital HA). Negative for seizures, loss of consciousness, syncope, facial asymmetry, speech difficulty, weakness and numbness.   Psychiatric/Behavioral:  Negative for confusion. The patient is nervous/anxious.         Patient anxious over elevated blood pressure   All other systems reviewed and are negative.    Past Medical History:   Diagnosis Date    Arthritis     Arthritis of back Long history    Celiac disease     Fracture of wrist Aug 21 2022    Boxer’s fravture    H/O bone density study     Hip pain     History of  colonoscopy     CSGA    History of mammogram 08/13/2020    Inflammatory arthritis     Lumbago     Lumbar degenerative disc disease     Metatarsalgia, left foot     Osteoarthritis of knees, bilateral     Pelvic fracture     Primary osteoarthritis of both hips     Sciatica     Thoracic spondylosis        No Known Allergies    Past Surgical History:   Procedure Laterality Date    COLONOSCOPY      CSGA    EYE SURGERY      FRACTURE SURGERY      left wrist; pelvis    JOINT REPLACEMENT  2017    TONSILLECTOMY      TOTAL HIP ARTHROPLASTY Right 10/03/2017    Procedure: TOTAL HIP ARTHROPLASTY RIGHT;  Surgeon: Prem Hodges MD;  Location: [facility];  Service:     TUBAL ABDOMINAL LIGATION         Family History   Problem Relation Age of Onset    Heart failure Mother         CHF    Osteoarthritis Mother     Heart attack Father         Acute MI    Stroke Father         Stroke syndrome    Breast cancer Paternal Aunt         MID 70'S    Ovarian cancer Neg Hx        Social History     Socioeconomic History    Marital status:    Tobacco Use    Smoking status: Never    Smokeless tobacco: Never   Vaping Use    Vaping Use: Never used   Substance and Sexual Activity    Alcohol use: No    Drug use: No    Sexual activity: Not Currently     Comment: Pill for 9 years           Objective   Physical Exam  Vitals and nursing note reviewed.   Constitutional:       General: She is not in acute distress.     Appearance: Normal appearance. She is not ill-appearing, toxic-appearing or diaphoretic.      Comments: Pleasant, talkative.  Anxious.  Nontoxic.  No acute sign of pain or distress.   HENT:      Head: Normocephalic and atraumatic.      Nose: Nose normal.      Mouth/Throat:      Mouth: Mucous membranes are moist.      Pharynx: Oropharynx is clear.   Eyes:      Extraocular Movements: Extraocular movements intact.      Conjunctiva/sclera: Conjunctivae normal.      Pupils: Pupils are equal, round, and reactive to light.   Neck:       Thyroid: No thyromegaly.      Comments: No thyromegaly  Cardiovascular:      Rate and Rhythm: Normal rate and regular rhythm. No extrasystoles are present.     Pulses: Normal pulses.      Heart sounds: Normal heart sounds. No murmur heard.     Comments: Regular rate and rhythm.  No ectopy.  No murmurs appreciated.  No pedal edema to lower extremities  Pulmonary:      Effort: Pulmonary effort is normal.      Breath sounds: Normal breath sounds.      Comments: Lungs are clear to auscultation bilaterally  Abdominal:      General: Bowel sounds are normal. There is no distension.      Palpations: Abdomen is soft.      Tenderness: There is no abdominal tenderness. There is no right CVA tenderness, left CVA tenderness, guarding or rebound.      Comments: Abdomen soft and nontender   Musculoskeletal:         General: Normal range of motion.      Cervical back: Normal range of motion and neck supple.      Right lower leg: No edema.      Left lower leg: No edema.   Skin:     General: Skin is warm and dry.   Neurological:      General: No focal deficit present.      Mental Status: She is alert and oriented to person, place, and time.      Cranial Nerves: Cranial nerves 2-12 are intact.      Sensory: Sensation is intact.      Motor: Motor function is intact.      Coordination: Coordination is intact.      Gait: Gait is intact.      Comments: Alert and oriented x3.  Smile is equal and tongue is midline.  Equal  strength 5 out of 5 and equal muscle strength lower extremities 5 out of 5.  No focal deficits.   Psychiatric:         Mood and Affect: Affect normal. Mood is anxious.         Speech: Speech normal.         Behavior: Behavior normal. Behavior is cooperative.         Thought Content: Thought content normal.         Cognition and Memory: Cognition normal.         Judgment: Judgment normal.      Comments: Anxious.  Normal affect.       Procedures           ED Course  ED Course as of 10/02/23 0002   Sun Oct 01, 2023    2235 /94 upon re-assessment. Pt resting comfortably. [FC]   2357 EKG shows sinus rhythm.  No evidence of ectopy, arrhythmia, or ischemia.  CBC and chemistries were within normal limits.  BUN and creatinine were normal at 16 and 0.69.  Urinalysis reveals no acute infectious process and there is no evidence of proteinuria.  TSH is 2.04.  Magnesium level is 2.8.  High-sensitivity troponin is 11.  Patient tested negative for COVID-19 and negative for influenza.  Chest x-ray showed no acute cardiopulmonary process.  I personally reviewed the films and there were no acute findings, including infiltrate or overt failure on my assessment.  CT of the brain without contrast reveals no acute intracranial abnormality.  Patient denies any chest pain or shortness of breath throughout the ER course.  Blood pressure does remain borderline elevated.  Discussed the case with Dr. Batres, ER attending physician.  He recommended starting lisinopril 10 mg by mouth daily and we will refer patient closely to the hypertension clinic.  Recommend low-sodium diet and no OTC herbal medicines or decongestants.  Recommend 2-3 blood pressure readings per day and for patient to take a blood pressure log to the hypertension clinic.  Patient is agreeable with above treatment plan. [FC]      ED Course User Index  [FC] Nancy Mcnair, DEVYN                Recent Results (from the past 24 hour(s))   ECG 12 Lead ED Triage Standing Order; Weak / Dizzy / AMS    Collection Time: 10/01/23  6:49 PM   Result Value Ref Range    QT Interval 382 ms    QTC Interval 406 ms   Comprehensive Metabolic Panel    Collection Time: 10/01/23  7:41 PM    Specimen: Arm, Right; Blood   Result Value Ref Range    Glucose 116 (H) 65 - 99 mg/dL    BUN 16 8 - 23 mg/dL    Creatinine 0.69 0.57 - 1.00 mg/dL    Sodium 141 136 - 145 mmol/L    Potassium 4.6 3.5 - 5.2 mmol/L    Chloride 102 98 - 107 mmol/L    CO2 31.0 (H) 22.0 - 29.0 mmol/L    Calcium 9.6 8.6 - 10.5 mg/dL    Total  Protein 7.3 6.0 - 8.5 g/dL    Albumin 4.5 3.5 - 5.2 g/dL    ALT (SGPT) 15 1 - 33 U/L    AST (SGOT) 18 1 - 32 U/L    Alkaline Phosphatase 70 39 - 117 U/L    Total Bilirubin 0.5 0.0 - 1.2 mg/dL    Globulin 2.8 gm/dL    A/G Ratio 1.6 g/dL    BUN/Creatinine Ratio 23.2 7.0 - 25.0    Anion Gap 8.0 5.0 - 15.0 mmol/L    eGFR 88.4 >60.0 mL/min/1.73   Single High Sensitivity Troponin T    Collection Time: 10/01/23  7:41 PM    Specimen: Arm, Right; Blood   Result Value Ref Range    HS Troponin T 11 (H) <10 ng/L   Magnesium    Collection Time: 10/01/23  7:41 PM    Specimen: Arm, Right; Blood   Result Value Ref Range    Magnesium 2.8 (H) 1.6 - 2.4 mg/dL   Green Top (Gel)    Collection Time: 10/01/23  7:41 PM   Result Value Ref Range    Extra Tube Hold for add-ons.    Lavender Top    Collection Time: 10/01/23  7:41 PM   Result Value Ref Range    Extra Tube hold for add-on    Gold Top - SST    Collection Time: 10/01/23  7:41 PM   Result Value Ref Range    Extra Tube Hold for add-ons.    Gray Top    Collection Time: 10/01/23  7:41 PM   Result Value Ref Range    Extra Tube Hold for add-ons.    Light Blue Top    Collection Time: 10/01/23  7:41 PM   Result Value Ref Range    Extra Tube Hold for add-ons.    CBC Auto Differential    Collection Time: 10/01/23  7:41 PM    Specimen: Arm, Right; Blood   Result Value Ref Range    WBC 4.93 3.40 - 10.80 10*3/mm3    RBC 4.42 3.77 - 5.28 10*6/mm3    Hemoglobin 13.9 12.0 - 15.9 g/dL    Hematocrit 43.0 34.0 - 46.6 %    MCV 97.3 (H) 79.0 - 97.0 fL    MCH 31.4 26.6 - 33.0 pg    MCHC 32.3 31.5 - 35.7 g/dL    RDW 13.1 12.3 - 15.4 %    RDW-SD 47.1 37.0 - 54.0 fl    MPV 9.5 6.0 - 12.0 fL    Platelets 234 140 - 450 10*3/mm3    Neutrophil % 63.9 42.7 - 76.0 %    Lymphocyte % 26.2 19.6 - 45.3 %    Monocyte % 8.3 5.0 - 12.0 %    Eosinophil % 1.0 0.3 - 6.2 %    Basophil % 0.4 0.0 - 1.5 %    Immature Grans % 0.2 0.0 - 0.5 %    Neutrophils, Absolute 3.15 1.70 - 7.00 10*3/mm3    Lymphocytes, Absolute 1.29 0.70  - 3.10 10*3/mm3    Monocytes, Absolute 0.41 0.10 - 0.90 10*3/mm3    Eosinophils, Absolute 0.05 0.00 - 0.40 10*3/mm3    Basophils, Absolute 0.02 0.00 - 0.20 10*3/mm3    Immature Grans, Absolute 0.01 0.00 - 0.05 10*3/mm3    nRBC 0.0 0.0 - 0.2 /100 WBC   TSH    Collection Time: 10/01/23  7:41 PM    Specimen: Arm, Right; Blood   Result Value Ref Range    TSH 2.040 0.270 - 4.200 uIU/mL   Urinalysis With Microscopic If Indicated (No Culture) - Urine, Clean Catch    Collection Time: 10/01/23  7:42 PM    Specimen: Urine, Clean Catch   Result Value Ref Range    Color, UA Yellow Yellow, Straw    Appearance, UA Clear Clear    pH, UA 7.5 5.0 - 8.0    Specific Gravity, UA 1.008 1.001 - 1.030    Glucose, UA Negative Negative    Ketones, UA Negative Negative    Bilirubin, UA Negative Negative    Blood, UA Negative Negative    Protein, UA Negative Negative    Leuk Esterase, UA Small (1+) (A) Negative    Nitrite, UA Negative Negative    Urobilinogen, UA 0.2 E.U./dL 0.2 - 1.0 E.U./dL   Urinalysis, Microscopic Only - Urine, Clean Catch    Collection Time: 10/01/23  7:42 PM    Specimen: Urine, Clean Catch   Result Value Ref Range    RBC, UA 0-2 None Seen, 0-2 /HPF    WBC, UA 0-2 None Seen, 0-2 /HPF    Bacteria, UA None Seen None Seen, Trace /HPF    Squamous Epithelial Cells, UA None Seen None Seen, 0-2 /HPF    Hyaline Casts, UA None Seen 0 - 6 /LPF    Methodology Automated Microscopy    COVID-19 and FLU A/B PCR - Swab, Nasopharynx    Collection Time: 10/01/23  8:57 PM    Specimen: Nasopharynx; Swab   Result Value Ref Range    COVID19 Not Detected Not Detected - Ref. Range    Influenza A PCR Not Detected Not Detected    Influenza B PCR Not Detected Not Detected     Note: In addition to lab results from this visit, the labs listed above may include labs taken at another facility or during a different encounter within the last 24 hours. Please correlate lab times with ED admission and discharge times for further clarification of the  services performed during this visit.    XR Chest 1 View   Final Result   Impression:   No active disease         Electronically Signed: Jose Antonio Stark MD     10/1/2023 8:25 PM EDT     Workstation ID: [id number]      CT Head Without Contrast   Final Result   Impression:   No acute intracranial abnormality            Electronically Signed: Jose Antonio Stark MD     10/1/2023 8:23 PM EDT     Workstation ID: [id number]        Vitals:    10/01/23 2227 10/01/23 2230 10/01/23 2234 10/01/23 2315   BP:  170/88 164/96 177/91   BP Location:       Patient Position:       Pulse: 64 62 66 62   Resp:       Temp:       TempSrc:       SpO2: 98% 98% 97% 96%   Weight:       Height:         Medications   sodium chloride 0.9 % flush 10 mL (has no administration in time range)   lisinopril (PRINIVIL,ZESTRIL) tablet 10 mg (10 mg Oral Given 10/1/23 2252)     ECG/EMG Results (last 24 hours)       Procedure Component Value Units Date/Time    ECG 12 Lead ED Triage Standing Order; Weak / Dizzy / AMS [[id number]] Collected: 10/01/23 1849     Updated: 10/01/23 1848     QT Interval 382 ms      QTC Interval 406 ms     Narrative:      Test Reason : ED Triage Standing Order~  Blood Pressure :   */*   mmHG  Vent. Rate :  68 BPM     Atrial Rate :  68 BPM     P-R Int : 168 ms          QRS Dur :  80 ms      QT Int : 382 ms       P-R-T Axes :  48  34  27 degrees     QTc Int : 406 ms    Normal sinus rhythm  Possible Left atrial enlargement  Borderline ECG  When compared with ECG of 21-SEP-2017 16:00,  Nonspecific T wave abnormality has replaced inverted T waves in Anterior leads    Referred By: ED MD           Confirmed By:           ECG 12 Lead ED Triage Standing Order; Weak / Dizzy / AMS   Preliminary Result   Test Reason : ED Triage Standing Order~   Blood Pressure :   */*   mmHG   Vent. Rate :  68 BPM     Atrial Rate :  68 BPM      P-R Int : 168 ms          QRS Dur :  80 ms       QT Int : 382 ms       P-R-T Axes :  48  34  27 degrees      QTc Int : 406 ms       Normal sinus rhythm   Possible Left atrial enlargement   Borderline ECG   When compared with ECG of 21-SEP-2017 16:00,   Nonspecific T wave abnormality has replaced inverted T waves in Anterior    leads      Referred By: ED MD           Confirmed By:                                        Medical Decision Making  Amount and/or Complexity of Data Reviewed  Labs: ordered.  Radiology: ordered.  ECG/medicine tests: ordered.    Risk  Prescription drug management.        Final diagnoses:   Elevated blood pressure reading without diagnosis of hypertension   Generalized headache   Malaise and fatigue   Dizziness   History of hyperlipidemia       ED Disposition  ED Disposition       ED Disposition   Discharge    Condition   Stable    Comment   --               [facility] CARDIOLOGY  [address]  [address]  [facility] [id number] [phone]  Call today  Call today for first available recheck    Zulma Ghotra MD  [address]    [place] [id number] [phone]    Schedule an appointment as soon as possible for a visit in 2 days  Close PCP follow-up for recheck    [facility] EMERGENCY DEPARTMENT  [address] [facility] [id number] [phone]    If symptoms worsen         Medication List        New Prescriptions      lisinopril 10 MG tablet  Commonly known as: PRINIVIL,ZESTRIL  Take 1 tablet by mouth Daily.               Where to Get Your Medications        These medications were sent to [id number] - [place], [place] - [address] [facility] [address] [facility] - [phone]  - [phone] FX  [address], [place] [facility] [address]      Phone: [phone]   lisinopril 10 MG tablet            Nancy Mcnair PA-C  10/02/23 0002

## 2023-10-02 VITALS
HEIGHT: 65 IN | TEMPERATURE: 97.9 F | DIASTOLIC BLOOD PRESSURE: 90 MMHG | OXYGEN SATURATION: 96 % | SYSTOLIC BLOOD PRESSURE: 156 MMHG | WEIGHT: 135 LBS | HEART RATE: 79 BPM | RESPIRATION RATE: 16 BRPM | BODY MASS INDEX: 22.49 KG/M2

## 2023-10-02 RX ORDER — LISINOPRIL 10 MG/1
10 TABLET ORAL DAILY
Qty: 30 TABLET | Refills: 0 | Status: SHIPPED | OUTPATIENT
Start: 2023-10-02

## 2023-10-02 NOTE — DISCHARGE INSTRUCTIONS
ER evaluation revealed normal EKG and normal troponin.  CBC and chemistries were within normal limits, including normal kidney function.  Urinalysis revealed no acute infectious process and no protein in the urine, which is reassuring.  CT of the brain without contrast was normal and chest x-ray was normal.  We will initiate lisinopril 10 mg by mouth daily dispense 30 no refills.  We will give patient information on hypertension and a Dash eating plan.  Recommend 2-3 home blood pressure readings per day and for patient to keep a log and we will refer her closely to our hypertension clinic.  Return sooner to the ER if any worsening symptoms.  Do not take any over-the-counter herbal medications or decongestants.  ER work-up is reassuring.

## 2023-10-04 ENCOUNTER — HOSPITAL ENCOUNTER (EMERGENCY)
Facility: HOSPITAL | Age: 78
Discharge: HOME | End: 2023-10-04
Attending: EMERGENCY MEDICINE | Admitting: EMERGENCY MEDICINE
Payer: MEDICARE

## 2023-10-04 ENCOUNTER — APPOINTMENT (OUTPATIENT)
Dept: GENERAL RADIOLOGY | Facility: HOSPITAL | Age: 78
End: 2023-10-04
Payer: MEDICARE

## 2023-10-04 ENCOUNTER — PATIENT OUTREACH (OUTPATIENT)
Dept: CASE MANAGEMENT | Facility: OTHER | Age: 78
End: 2023-10-04
Payer: MEDICARE

## 2023-10-04 ENCOUNTER — APPOINTMENT (OUTPATIENT)
Dept: MRI IMAGING | Facility: HOSPITAL | Age: 78
End: 2023-10-04
Payer: MEDICARE

## 2023-10-04 VITALS
HEIGHT: 65 IN | RESPIRATION RATE: 16 BRPM | WEIGHT: 135 LBS | TEMPERATURE: 98 F | OXYGEN SATURATION: 97 % | SYSTOLIC BLOOD PRESSURE: 152 MMHG | BODY MASS INDEX: 22.49 KG/M2 | HEART RATE: 66 BPM | DIASTOLIC BLOOD PRESSURE: 89 MMHG

## 2023-10-04 DIAGNOSIS — R23.2 FACIAL FLUSHING: ICD-10-CM

## 2023-10-04 DIAGNOSIS — R51.9 NONINTRACTABLE EPISODIC HEADACHE, UNSPECIFIED HEADACHE TYPE: ICD-10-CM

## 2023-10-04 DIAGNOSIS — I16.0 HYPERTENSIVE URGENCY: Primary | ICD-10-CM

## 2023-10-04 LAB
ALBUMIN SERPL-MCNC: 4 G/DL (ref 3.5–5.2)
ALBUMIN/GLOB SERPL: 1.6 G/DL
ALP SERPL-CCNC: 63 U/L (ref 39–117)
ALT SERPL W P-5'-P-CCNC: 15 U/L (ref 1–33)
ANION GAP SERPL CALCULATED.3IONS-SCNC: 8 MMOL/L (ref 5–15)
AST SERPL-CCNC: 20 U/L (ref 1–32)
BACTERIA UR QL AUTO: NORMAL /HPF
BASOPHILS # BLD AUTO: 0.04 10*3/MM3 (ref 0–0.2)
BASOPHILS NFR BLD AUTO: 0.9 % (ref 0–1.5)
BILIRUB SERPL-MCNC: 0.5 MG/DL (ref 0–1.2)
BILIRUB UR QL STRIP: NEGATIVE
BUN SERPL-MCNC: 16 MG/DL (ref 8–23)
BUN/CREAT SERPL: 24.2 (ref 7–25)
CALCIUM SPEC-SCNC: 9.1 MG/DL (ref 8.6–10.5)
CHLORIDE SERPL-SCNC: 101 MMOL/L (ref 98–107)
CLARITY UR: CLEAR
CO2 SERPL-SCNC: 29 MMOL/L (ref 22–29)
COLOR UR: YELLOW
CREAT SERPL-MCNC: 0.66 MG/DL (ref 0.57–1)
DEPRECATED RDW RBC AUTO: 46.8 FL (ref 37–54)
EGFRCR SERPLBLD CKD-EPI 2021: 89.4 ML/MIN/1.73
EOSINOPHIL # BLD AUTO: 0.05 10*3/MM3 (ref 0–0.4)
EOSINOPHIL NFR BLD AUTO: 1.1 % (ref 0.3–6.2)
ERYTHROCYTE [DISTWIDTH] IN BLOOD BY AUTOMATED COUNT: 13.2 % (ref 12.3–15.4)
GLOBULIN UR ELPH-MCNC: 2.5 GM/DL
GLUCOSE SERPL-MCNC: 109 MG/DL (ref 65–99)
GLUCOSE UR STRIP-MCNC: NEGATIVE MG/DL
HCT VFR BLD AUTO: 39.4 % (ref 34–46.6)
HGB BLD-MCNC: 12.8 G/DL (ref 12–15.9)
HGB UR QL STRIP.AUTO: NEGATIVE
HOLD SPECIMEN: NORMAL
HYALINE CASTS UR QL AUTO: NORMAL /LPF
IMM GRANULOCYTES # BLD AUTO: 0.01 10*3/MM3 (ref 0–0.05)
IMM GRANULOCYTES NFR BLD AUTO: 0.2 % (ref 0–0.5)
KETONES UR QL STRIP: NEGATIVE
LEUKOCYTE ESTERASE UR QL STRIP.AUTO: ABNORMAL
LYMPHOCYTES # BLD AUTO: 1.22 10*3/MM3 (ref 0.7–3.1)
LYMPHOCYTES NFR BLD AUTO: 26.6 % (ref 19.6–45.3)
MAGNESIUM SERPL-MCNC: 2 MG/DL (ref 1.6–2.4)
MCH RBC QN AUTO: 31.3 PG (ref 26.6–33)
MCHC RBC AUTO-ENTMCNC: 32.5 G/DL (ref 31.5–35.7)
MCV RBC AUTO: 96.3 FL (ref 79–97)
MONOCYTES # BLD AUTO: 0.4 10*3/MM3 (ref 0.1–0.9)
MONOCYTES NFR BLD AUTO: 8.7 % (ref 5–12)
NEUTROPHILS NFR BLD AUTO: 2.86 10*3/MM3 (ref 1.7–7)
NEUTROPHILS NFR BLD AUTO: 62.5 % (ref 42.7–76)
NITRITE UR QL STRIP: NEGATIVE
NRBC BLD AUTO-RTO: 0 /100 WBC (ref 0–0.2)
PH UR STRIP.AUTO: 7.5 [PH] (ref 5–8)
PLATELET # BLD AUTO: 206 10*3/MM3 (ref 140–450)
PMV BLD AUTO: 9.5 FL (ref 6–12)
POTASSIUM SERPL-SCNC: 4.3 MMOL/L (ref 3.5–5.2)
PROT SERPL-MCNC: 6.5 G/DL (ref 6–8.5)
PROT UR QL STRIP: NEGATIVE
RBC # BLD AUTO: 4.09 10*6/MM3 (ref 3.77–5.28)
RBC # UR STRIP: NORMAL /HPF
REF LAB TEST METHOD: NORMAL
SODIUM SERPL-SCNC: 138 MMOL/L (ref 136–145)
SP GR UR STRIP: 1.01 (ref 1–1.03)
SQUAMOUS #/AREA URNS HPF: NORMAL /HPF
TROPONIN T SERPL HS-MCNC: 12 NG/L
UROBILINOGEN UR QL STRIP: ABNORMAL
WBC # UR STRIP: NORMAL /HPF
WBC NRBC COR # BLD: 4.58 10*3/MM3 (ref 3.4–10.8)
WHOLE BLOOD HOLD COAG: NORMAL
WHOLE BLOOD HOLD SPECIMEN: NORMAL

## 2023-10-04 PROCEDURE — 81001 URINALYSIS AUTO W/SCOPE: CPT | Performed by: EMERGENCY MEDICINE

## 2023-10-04 PROCEDURE — 83735 ASSAY OF MAGNESIUM: CPT | Performed by: EMERGENCY MEDICINE

## 2023-10-04 PROCEDURE — 85025 COMPLETE CBC W/AUTO DIFF WBC: CPT | Performed by: EMERGENCY MEDICINE

## 2023-10-04 PROCEDURE — 84484 ASSAY OF TROPONIN QUANT: CPT | Performed by: EMERGENCY MEDICINE

## 2023-10-04 PROCEDURE — 99284 EMERGENCY DEPT VISIT MOD MDM: CPT

## 2023-10-04 PROCEDURE — 71045 X-RAY EXAM CHEST 1 VIEW: CPT

## 2023-10-04 PROCEDURE — 93005 ELECTROCARDIOGRAM TRACING: CPT | Performed by: EMERGENCY MEDICINE

## 2023-10-04 PROCEDURE — 70551 MRI BRAIN STEM W/O DYE: CPT

## 2023-10-04 PROCEDURE — 80053 COMPREHEN METABOLIC PANEL: CPT | Performed by: EMERGENCY MEDICINE

## 2023-10-04 RX ORDER — SODIUM CHLORIDE 0.9 % (FLUSH) 0.9 %
10 SYRINGE (ML) INJECTION AS NEEDED
Status: DISCONTINUED | OUTPATIENT
Start: 2023-10-04 | End: 2023-10-04 | Stop reason: HOSPADM

## 2023-10-04 RX ORDER — CLONIDINE HYDROCHLORIDE 0.1 MG/1
0.1 TABLET ORAL 2 TIMES DAILY PRN
Qty: 4 TABLET | Refills: 0 | Status: SHIPPED | OUTPATIENT
Start: 2023-10-04

## 2023-10-04 NOTE — OUTREACH NOTE
Patient Outreach    AMBULATORY CASE MANAGEMENT NOTE    Name and Relationship of Patient/Support Person: Jacques Yeboah H - Self    Call placed to patient to follow up on recent ED visit. Introduced self and role of ACM. Pt states she is doing ok. She has been monitoring her blood pressure and keeping a log 3xaday. The earliest she could get a cardiology appointment was in 2 weeks. Suggested contacting her PCP for ED follow up.   Discussed low salt diet. She lives in independent living but does eat with the assisted living area with prepared food most of the time. Denies any questions or concerns at this time.       Education Documentation  Unresolved/Worsening Symptoms, taught by Norah Ferrari RN at 10/4/2023  1:45 PM.  Learner: Patient  Readiness: Eager  Method: Explanation  Response: Verbalizes Understanding    Self-Care, taught by Norah Ferrari RN at 10/4/2023  1:45 PM.  Learner: Patient  Readiness: Eager  Method: Explanation  Response: Verbalizes Understanding    Provider Follow-Up, taught by Norah Ferrari RN at 10/4/2023  1:45 PM.  Learner: Patient  Readiness: Eager  Method: Explanation  Response: Verbalizes Understanding    Medication Management, taught by Norah Ferrari RN at 10/4/2023  1:45 PM.  Learner: Patient  Readiness: Eager  Method: Explanation  Response: Verbalizes Understanding    Blood Pressure Monitoring, taught by Norah Ferrari RN at 10/4/2023  1:45 PM.  Learner: Patient  Readiness: Eager  Method: Explanation  Response: Verbalizes Understanding    Risk Factors, taught by Norah Ferrari RN at 10/4/2023  1:45 PM.  Learner: Patient  Readiness: Eager  Method: Explanation  Response: Verbalizes Understanding    Description, taught by Norah Ferrari RN at 10/4/2023  1:45 PM.  Learner: Patient  Readiness: Eager  Method: Explanation  Response: Verbalizes Understanding          Norah SINGLETON  Ambulatory Case Management    10/4/2023, 13:43 EDT

## 2023-10-04 NOTE — ED PROVIDER NOTES
Subjective   History of Present Illness  Patient is a pleasant 78-year-old female with a recent history of hypertension who presents to the emergency department due to persistent hypertension along with vague headache.  She states that he was recently diagnosed with a basal cell carcinoma in her groin region and her hypertensive episode seems to correspond with her radiation treatments.  States that she had a radiation treatment following this was at home and again to have the mild headache.  On her initial presentation 2 days ago the headache was more occipital whereas now it is more of a generalized headache.  In addition, she notes that she had bilateral floaters in her vision.  These have resolved but given the nonspecific visual and dizzy complaint along with the associated hypertension with a systolic of 184 tonight she decided come to the emergency department.  Denies other associated symptoms.  Denies fever, chills, chest pain, difficulty breathing, abdominal pain, vomiting, diarrhea, or other acute complaints.  Denies similar episodes in the past.  Patient's been maintaining a blood pressure diary and over the last several days her typical pressures are between 120 and 140 systolic.  Tonight, prior to the visit her initial pressure checked at home was 160 and then she rechecked and it was 184 and the combination of these pressures prompted ED visit.    Review of Systems   All other systems reviewed and are negative.    Past Medical History:   Diagnosis Date    Arthritis     Arthritis of back Long history    Celiac disease     Fracture of wrist Aug 21 2022    Boxer’s fravture    H/O bone density study     Hip pain     History of colonoscopy     CSGA    History of mammogram 08/13/2020    Inflammatory arthritis     Lumbago     Lumbar degenerative disc disease     Metatarsalgia, left foot     Osteoarthritis of knees, bilateral     Pelvic fracture     Primary osteoarthritis of both hips     Sciatica     Thoracic  spondylosis        No Known Allergies    Past Surgical History:   Procedure Laterality Date    COLONOSCOPY      CSGA    EYE SURGERY      FRACTURE SURGERY      left wrist; pelvis    JOINT REPLACEMENT  2017    TONSILLECTOMY      TOTAL HIP ARTHROPLASTY Right 10/03/2017    Procedure: TOTAL HIP ARTHROPLASTY RIGHT;  Surgeon: Prem Hodges MD;  Location: [facility];  Service:     TUBAL ABDOMINAL LIGATION         Family History   Problem Relation Age of Onset    Heart failure Mother         CHF    Osteoarthritis Mother     Heart attack Father         Acute MI    Stroke Father         Stroke syndrome    Breast cancer Paternal Aunt         MID 70'S    Ovarian cancer Neg Hx        Social History     Socioeconomic History    Marital status:    Tobacco Use    Smoking status: Never    Smokeless tobacco: Never   Vaping Use    Vaping Use: Never used   Substance and Sexual Activity    Alcohol use: No    Drug use: No    Sexual activity: Not Currently     Comment: Pill for 9 years           Objective   Physical Exam  Vitals and nursing note reviewed.   Constitutional:       General: She is not in acute distress.  HENT:      Head: Normocephalic and atraumatic.   Eyes:      Conjunctiva/sclera: Conjunctivae normal.      Pupils: Pupils are equal, round, and reactive to light.   Neck:      Thyroid: No thyromegaly.   Cardiovascular:      Rate and Rhythm: Normal rate and regular rhythm.      Heart sounds: Normal heart sounds. No murmur heard.    No friction rub. No gallop.   Pulmonary:      Effort: Pulmonary effort is normal. No respiratory distress.      Breath sounds: Normal breath sounds.   Abdominal:      General: Bowel sounds are normal.      Palpations: Abdomen is soft.      Tenderness: There is no abdominal tenderness.   Musculoskeletal:         General: Normal range of motion.      Cervical back: Normal range of motion and neck supple.   Lymphadenopathy:      Cervical: No cervical adenopathy.   Skin:     General: Skin is warm  and dry.   Neurological:      Mental Status: She is alert and oriented to person, place, and time.   Psychiatric:         Mood and Affect: Mood normal.         Behavior: Behavior normal.         Thought Content: Thought content normal.       Procedures           ED Course      Recent Results (from the past 24 hour(s))   Comprehensive Metabolic Panel    Collection Time: 10/04/23  6:52 PM    Specimen: Blood   Result Value Ref Range    Glucose 109 (H) 65 - 99 mg/dL    BUN 16 8 - 23 mg/dL    Creatinine 0.66 0.57 - 1.00 mg/dL    Sodium 138 136 - 145 mmol/L    Potassium 4.3 3.5 - 5.2 mmol/L    Chloride 101 98 - 107 mmol/L    CO2 29.0 22.0 - 29.0 mmol/L    Calcium 9.1 8.6 - 10.5 mg/dL    Total Protein 6.5 6.0 - 8.5 g/dL    Albumin 4.0 3.5 - 5.2 g/dL    ALT (SGPT) 15 1 - 33 U/L    AST (SGOT) 20 1 - 32 U/L    Alkaline Phosphatase 63 39 - 117 U/L    Total Bilirubin 0.5 0.0 - 1.2 mg/dL    Globulin 2.5 gm/dL    A/G Ratio 1.6 g/dL    BUN/Creatinine Ratio 24.2 7.0 - 25.0    Anion Gap 8.0 5.0 - 15.0 mmol/L    eGFR 89.4 >60.0 mL/min/1.73   Single High Sensitivity Troponin T    Collection Time: 10/04/23  6:52 PM    Specimen: Blood   Result Value Ref Range    HS Troponin T 12 (H) <10 ng/L   Magnesium    Collection Time: 10/04/23  6:52 PM    Specimen: Blood   Result Value Ref Range    Magnesium 2.0 1.6 - 2.4 mg/dL   Green Top (Gel)    Collection Time: 10/04/23  6:52 PM   Result Value Ref Range    Extra Tube Hold for add-ons.    Lavender Top    Collection Time: 10/04/23  6:52 PM   Result Value Ref Range    Extra Tube hold for add-on    Gold Top - SST    Collection Time: 10/04/23  6:52 PM   Result Value Ref Range    Extra Tube Hold for add-ons.    Gray Top    Collection Time: 10/04/23  6:52 PM   Result Value Ref Range    Extra Tube Hold for add-ons.    Light Blue Top    Collection Time: 10/04/23  6:52 PM   Result Value Ref Range    Extra Tube Hold for add-ons.    CBC Auto Differential    Collection Time: 10/04/23  6:52 PM    Specimen:  Blood   Result Value Ref Range    WBC 4.58 3.40 - 10.80 10*3/mm3    RBC 4.09 3.77 - 5.28 10*6/mm3    Hemoglobin 12.8 12.0 - 15.9 g/dL    Hematocrit 39.4 34.0 - 46.6 %    MCV 96.3 79.0 - 97.0 fL    MCH 31.3 26.6 - 33.0 pg    MCHC 32.5 31.5 - 35.7 g/dL    RDW 13.2 12.3 - 15.4 %    RDW-SD 46.8 37.0 - 54.0 fl    MPV 9.5 6.0 - 12.0 fL    Platelets 206 140 - 450 10*3/mm3    Neutrophil % 62.5 42.7 - 76.0 %    Lymphocyte % 26.6 19.6 - 45.3 %    Monocyte % 8.7 5.0 - 12.0 %    Eosinophil % 1.1 0.3 - 6.2 %    Basophil % 0.9 0.0 - 1.5 %    Immature Grans % 0.2 0.0 - 0.5 %    Neutrophils, Absolute 2.86 1.70 - 7.00 10*3/mm3    Lymphocytes, Absolute 1.22 0.70 - 3.10 10*3/mm3    Monocytes, Absolute 0.40 0.10 - 0.90 10*3/mm3    Eosinophils, Absolute 0.05 0.00 - 0.40 10*3/mm3    Basophils, Absolute 0.04 0.00 - 0.20 10*3/mm3    Immature Grans, Absolute 0.01 0.00 - 0.05 10*3/mm3    nRBC 0.0 0.0 - 0.2 /100 WBC   ECG 12 Lead ED Triage Standing Order; Weak / Dizzy / AMS    Collection Time: 10/04/23  7:02 PM   Result Value Ref Range    QT Interval 406 ms    QTC Interval 425 ms   Urinalysis With Microscopic If Indicated (No Culture) - Urine, Clean Catch    Collection Time: 10/04/23  7:04 PM    Specimen: Urine, Clean Catch   Result Value Ref Range    Color, UA Yellow Yellow, Straw    Appearance, UA Clear Clear    pH, UA 7.5 5.0 - 8.0    Specific Gravity, UA 1.009 1.001 - 1.030    Glucose, UA Negative Negative    Ketones, UA Negative Negative    Bilirubin, UA Negative Negative    Blood, UA Negative Negative    Protein, UA Negative Negative    Leuk Esterase, UA Trace (A) Negative    Nitrite, UA Negative Negative    Urobilinogen, UA 0.2 E.U./dL 0.2 - 1.0 E.U./dL   Urinalysis, Microscopic Only - Urine, Clean Catch    Collection Time: 10/04/23  7:04 PM    Specimen: Urine, Clean Catch   Result Value Ref Range    RBC, UA 0-2 None Seen, 0-2 /HPF    WBC, UA None Seen None Seen, 0-2 /HPF    Bacteria, UA None Seen None Seen, Trace /HPF    Squamous  Epithelial Cells, UA None Seen None Seen, 0-2 /HPF    Hyaline Casts, UA None Seen 0 - 6 /LPF    Methodology Automated Microscopy      Note: In addition to lab results from this visit, the labs listed above may include labs taken at another facility or during a different encounter within the last 24 hours. Please correlate lab times with ED admission and discharge times for further clarification of the services performed during this visit.    MRI Brain Without Contrast   Final Result   1.No intracranial hemorrhage. No acute infarction.      2.Involutional and ischemic gliotic changes.                        Electronically Signed: Clyde Christopher MD     10/4/2023 8:40 PM EDT     Workstation ID: [id number]      XR Chest 1 View   Final Result   Impression:      No acute pulmonary disease.      Electronically Signed: Clyde Christopher MD     10/4/2023 6:37 PM EDT     Workstation ID: [id number]        Vitals:    10/04/23 1816 10/04/23 1830 10/04/23 1905 10/04/23 1930   BP: 153/91 152/97 151/82 152/89   BP Location:    Right arm   Patient Position:    Sitting   Pulse: 85 80 96 66   Resp:       Temp:       TempSrc:       SpO2: 97% 99% 98% 97%   Weight:       Height:         Medications - No data to display    ECG/EMG Results (last 24 hours)       ** No results found for the last 24 hours. **          ECG 12 Lead ED Triage Standing Order; Weak / Dizzy / AMS   Preliminary Result   Test Reason : ED Triage Standing Order~   Blood Pressure :   */*   mmHG   Vent. Rate :  66 BPM     Atrial Rate :  66 BPM      P-R Int : 174 ms          QRS Dur :  86 ms       QT Int : 406 ms       P-R-T Axes :  44  36  23 degrees      QTc Int : 425 ms      Normal sinus rhythm   Normal ECG   When compared with ECG of 01-OCT-2023 18:49, (Unconfirmed)   No significant change was found      Referred By: EDMD           Confirmed By:                                                Medical Decision Making  Work-up is reassuring with a negative MRI.  Patient did  not require medication to control her blood pressure in the emergency department.  Systolics been in the 150s.  The etiology for her flushed face sensation is not clear.  Further outpatient work-up is warranted should this persist.  She has reliable outpatient management of both in regards to her radiation treatments and she is following up with the hypertension clinic next week.  She will continue to take a blood pressure diary and I given her clonidine to use as needed and also to give her peace of mind that she did have some have something she can take should her abrupt blood pressure elevate.  She understands that she will keep a close eye on symptoms and have a low threshold to return to the emergency department if symptoms persist, worsen, or other concerns arise.    Problems Addressed:  Facial flushing: complicated acute illness or injury  Hypertensive urgency: complicated acute illness or injury  Nonintractable episodic headache, unspecified headache type: complicated acute illness or injury    Amount and/or Complexity of Data Reviewed  External Data Reviewed: notes.  Labs: ordered. Decision-making details documented in ED Course.  Radiology: ordered and independent interpretation performed. Decision-making details documented in ED Course.  ECG/medicine tests: ordered and independent interpretation performed. Decision-making details documented in ED Course.    Risk  Prescription drug management.        Final diagnoses:   Hypertensive urgency   Facial flushing   Nonintractable episodic headache, unspecified headache type       ED Disposition  ED Disposition       ED Disposition   Discharge    Condition   Stable    Comment   --           DISCHARGE    Patient discharged in stable condition.    Reviewed implications of results, diagnosis, meds, responsibility to follow up, warning signs and symptoms of possible worsening, potential complications and reasons to return to ER.    Patient/Family voiced understanding  of above instructions.    Discussed plan for discharge, as there is no emergent indication for admission.  Pt/family is agreeable and understands need for follow up and possible repeat testing.  Pt/family is aware that discharge does not mean that nothing is wrong but that it indicates no emergency is currently present that requires admission and they must continue care with follow-up as given below or with a physician of their choice.     FOLLOW-UP  Zulma Ghotra MD  [address]    [place] [id number] [phone]    Schedule an appointment as soon as possible for a visit       Hypertension clinic as already scheduled    Schedule an appointment as soon as possible for a visit            Medication List        New Prescriptions      cloNIDine 0.1 MG tablet  Commonly known as: CATAPRES  Take 1 tablet by mouth 2 (Two) Times a Day As Needed for High Blood Pressure (Systolic BP above 180 or Diastolic BP over 105).               Where to Get Your Medications        These medications were sent to [id number] - [place], [place] - [address] AT [facility] - [phone] [facility] [phone]   [address] [place] [address] [address], [facility] [address]      Phone: [phone]   cloNIDine 0.1 MG tablet            Lalo Russell DO  10/05/23 0910

## 2023-10-05 LAB
QT INTERVAL: 382 MS
QT INTERVAL: 406 MS
QTC INTERVAL: 406 MS
QTC INTERVAL: 425 MS

## 2023-10-11 ENCOUNTER — HOSPITAL ENCOUNTER (OUTPATIENT)
Dept: MAMMOGRAPHY | Facility: HOSPITAL | Age: 78
Discharge: HOME | End: 2023-10-11
Admitting: INTERNAL MEDICINE
Payer: MEDICARE

## 2023-10-11 DIAGNOSIS — Z12.31 SCREENING MAMMOGRAM, ENCOUNTER FOR: ICD-10-CM

## 2023-10-11 PROCEDURE — 77067 SCR MAMMO BI INCL CAD: CPT

## 2023-10-11 PROCEDURE — 77063 BREAST TOMOSYNTHESIS BI: CPT

## 2023-10-13 ENCOUNTER — OFFICE VISIT (OUTPATIENT)
Dept: CARDIOLOGY | Facility: HOSPITAL | Age: 78
End: 2023-10-13
Payer: MEDICARE

## 2023-10-13 VITALS
HEIGHT: 65 IN | DIASTOLIC BLOOD PRESSURE: 77 MMHG | SYSTOLIC BLOOD PRESSURE: 132 MMHG | HEART RATE: 80 BPM | TEMPERATURE: 98.1 F | BODY MASS INDEX: 22.79 KG/M2 | WEIGHT: 136.8 LBS | RESPIRATION RATE: 18 BRPM | OXYGEN SATURATION: 98 %

## 2023-10-13 DIAGNOSIS — I10 PRIMARY HYPERTENSION: Primary | ICD-10-CM

## 2023-10-13 LAB
QT INTERVAL: 406 MS
QTC INTERVAL: 425 MS

## 2023-10-13 RX ORDER — LISINOPRIL 10 MG/1
10 TABLET ORAL DAILY
Qty: 30 TABLET | Refills: 3 | Status: SHIPPED | OUTPATIENT
Start: 2023-10-13

## 2023-10-13 NOTE — PROGRESS NOTES
Chief Complaint  Hypertension and Dizziness    Subjective      History of Present Illness {  Problem List  Visit  Diagnosis   Encounters  Notes  Medications  Labs  Result Review Imaging  Media :23}     Jacques Yeboah, 78 y.o. female with past medical history significant for hyperlipidemia, recent basal cell carcinoma, who presents to [facility] for Hypertension and Dizziness.  Patient recently has been undergoing radiation for her basal cell carcinoma located in her groin region and has been having hypertensive episodes.  Patient was evaluated in the ED on 10/1/2023 as well as 10/4/2023 both with episodes of hypertension.  EKG on 10/4 showed normal sinus rhythm, rate 66, normal EKG.  EKG on 10/1/2023 showed normal sinus rhythm, rate 68, possible left atrial enlargement.  MRI of the brain showed no acute intracranial hemorrhage, and no acute intracranial infarction, involutional and ischemic gliotic changes.  Chest x-ray has revealed no acute pulmonary process.  Troponin is noted to be only mildly elevated at 11, and 12.  Magnesium, CMP, and CBC noted to be unremarkable.    Of note, patient has recently been following with Dr. Moore.     Since time of evaluation in ED, patient denies chest pain or pressure, shortness of air, palpitations, syncope, near syncope, lower extremity edema, and worsening fatigue.  Patient is compliant with checking blood pressure at home and checks blood pressure multiple times a day.  Since addition of lisinopril by the ER provider, patient's blood pressure has been more adequately controlled.  Patient states that she has now completed all of her radiation treatments and that her oncologist did not believe that hypertension was related to these treatments.  Patient is typically very active and walks frequently for exercise.  She endorses minimal caffeine intake and states she drinks plenty of water.    Echocardiogram on 7/31/2023 at   "Moore's office:  EF 60%, right ventricle normal in size and function, there is mild aortic, mitral, and tricuspid regurgitation.    Objective     Vital Signs:   Vitals:    10/13/23 1416 10/13/23 1417 10/13/23 1420 10/13/23 1421   BP: 132/65 119/58 136/79 132/77   BP Location: Left arm Left arm Left arm Left arm   Patient Position: Standing Sitting Sitting Sitting   Cuff Size: Adult Adult Adult Adult   Pulse: 78 80     Resp:  18     Temp: 98.1 øF (36.7 øC) 98.1 øF (36.7 øC)     TempSrc:  Temporal     SpO2: 98% 98%     Weight:  62.1 kg (136 lb 12.8 oz)     Height:  165.1 cm (65\")       Body mass index is 22.76 kg/mý.  Physical Exam  Vitals and nursing note reviewed.   Constitutional:       Appearance: Normal appearance.   HENT:      Head: Normocephalic.   Eyes:      Pupils: Pupils are equal, round, and reactive to light.   Cardiovascular:      Rate and Rhythm: Normal rate and regular rhythm.      Pulses: Normal pulses.      Heart sounds: Normal heart sounds. No murmur heard.  Pulmonary:      Effort: Pulmonary effort is normal.      Breath sounds: Normal breath sounds.   Abdominal:      General: Bowel sounds are normal.      Palpations: Abdomen is soft.   Musculoskeletal:         General: Normal range of motion.      Cervical back: Normal range of motion.      Right lower leg: No edema.      Left lower leg: No edema.   Skin:     General: Skin is warm and dry.      Capillary Refill: Capillary refill takes less than 2 seconds.   Neurological:      Mental Status: She is alert and oriented to person, place, and time.   Psychiatric:         Mood and Affect: Mood normal.         Thought Content: Thought content normal.                Data Reviewed:{ Labs  Result Review  Imaging  Med Tab  Media :23}   Lab Results   Component Value Date    WBC 4.58 10/04/2023    HGB 12.8 10/04/2023    HCT 39.4 10/04/2023    MCV 96.3 10/04/2023     10/04/2023      Lab Results   Component Value Date    GLUCOSE 109 (H) 10/04/2023    " BUN 16 10/04/2023    CREATININE 0.66 10/04/2023    EGFR 89.4 10/04/2023    BCR 24.2 10/04/2023    K 4.3 10/04/2023    CO2 29.0 10/04/2023    CALCIUM 9.1 10/04/2023    ALBUMIN 4.0 10/04/2023    BILITOT 0.5 10/04/2023    AST 20 10/04/2023    ALT 15 10/04/2023      Lab Results   Component Value Date    TROPONINT 12 (H) 10/04/2023      Lab Results   Component Value Date    TSH 2.040 10/01/2023      MRI Brain Without Contrast (10/04/2023 20:32)  XR Chest 1 View (10/04/2023 18:36)  XR Chest 1 View (10/01/2023 20:17)  ECG 12 Lead ED Triage Standing Order; Weak / Dizzy / AMS (10/04/2023 19:02)  ECG 12 Lead ED Triage Standing Order; Weak / Dizzy / AMS (10/01/2023 18:49)    Assessment & Plan   Assessment and Plan {CC Problem List  Visit Diagnosis  ROS  Review (Popup)  Health Maintenance  Quality  BestPractice  Medications  SmartSets  SnapShot Encounters  Media :23}     1. Primary hypertension  -Blood pressure in office and on patient's most recent ambulatory blood pressure readings well controlled  -Patient to continue lisinopril 10 mg daily no refills were sent in today  -Patient was instructed to take blood pressure only once a day.  This will be done in the morning as patient takes medication at night.  Patient encouraged to be resting for 10 to 15 minutes prior to blood pressure reading.  He was encouraged to reach out to the heart and valve Center if blood pressures consistently greater than 140/90 or consistently less than 110 systolic.  - lisinopril (PRINIVIL,ZESTRIL) 10 MG tablet; Take 1 tablet by mouth Daily.  Dispense: 30 tablet; Refill: 3  -Currently, patient states she would like to continue following up with Dr. Moore as she already has follow-up scheduled with his staff.  -She will reach out to heart and valve Center if symptoms worsen or change prior to that visit        Follow Up {Instructions Charge Capture  Follow-up Communications :23}     Return if symptoms worsen or fail to  improve.    Patient was given instructions and counseling regarding her condition or for health maintenance advice. Please see specific information pulled into the AVS if appropriate.  Patient was instructed to call the [facility] with any questions, concerns, or worsening symptoms.    Dictated Utilizing Dragon Dictation   Please note that portions of this note were completed with a voice recognition program.   Part of this note may be an electronic transcription/translation of spoken language to printed text using the Dragon Dictation System.

## 2023-10-13 NOTE — PATIENT INSTRUCTIONS
Continue to check blood pressure once a day  Blood pressure medicine to be taken at night, check blood pressure in the morning. Rest for 10-15 minutes prior to checking blood pressure  If blood pressure is higher than 140/90 for two days in a row call clinic  If blood pressure is less than 110 on the top DO NOT TAKE LISINOPRIL for that day only. Check blood pressure again next day.   If blood pressure stays below 110 on the top for more than one day call clinic.

## 2023-10-19 ENCOUNTER — TRANSCRIBE ORDERS (OUTPATIENT)
Dept: ADMINISTRATIVE | Facility: HOSPITAL | Age: 78
End: 2023-10-19
Payer: MEDICARE

## 2023-10-19 DIAGNOSIS — Z12.31 VISIT FOR SCREENING MAMMOGRAM: Primary | ICD-10-CM

## 2023-10-23 ENCOUNTER — TELEPHONE (OUTPATIENT)
Dept: CARDIOLOGY | Facility: HOSPITAL | Age: 78
End: 2023-10-23
Payer: MEDICARE

## 2023-10-23 NOTE — TELEPHONE ENCOUNTER
Returned patients call, she stated her blood pressure is fine now but wanted Aimee Vaibhav LAYNE to know that it did reach 170/90's, she's been fine now since being on the lisinopril and having clonidine on hand, her blood pressure is now running 128/72--140/80. She is not symptomatic, she will call back if this changes.

## 2023-10-24 ENCOUNTER — TELEPHONE (OUTPATIENT)
Dept: CARDIOLOGY | Facility: HOSPITAL | Age: 78
End: 2023-10-24
Payer: MEDICARE

## 2023-10-24 NOTE — TELEPHONE ENCOUNTER
Attempted to call patient to discuss recent blood pressure readings, and recent completed neurologic work-up in ED.  Unable to reach patient.  Left voicemail.  I will try again soon.

## 2023-10-24 NOTE — TELEPHONE ENCOUNTER
Returned patients call regarding concern for her blood pressure, it was 165/95 this morning checked again a little later it was 166/84. She's experiencing some symptoms of tingling in her fingers, left arm pressure, feels weak, headache, feels flushed. She's very concerned that she may have a stroke because of similar symptoms that someone she knows was experiencing and they did have a stroke. She was asking about doing other testing like getting an mri, or other imaging done on her carotid and/or subclavian.

## 2023-10-25 ENCOUNTER — TELEPHONE (OUTPATIENT)
Dept: CARDIOLOGY | Facility: HOSPITAL | Age: 78
End: 2023-10-25
Payer: MEDICARE

## 2023-10-25 ENCOUNTER — OFFICE VISIT (OUTPATIENT)
Dept: INTERNAL MEDICINE | Facility: CLINIC | Age: 78
End: 2023-10-25
Payer: MEDICARE

## 2023-10-25 VITALS
SYSTOLIC BLOOD PRESSURE: 128 MMHG | OXYGEN SATURATION: 95 % | HEIGHT: 64 IN | HEART RATE: 81 BPM | DIASTOLIC BLOOD PRESSURE: 76 MMHG | BODY MASS INDEX: 23.22 KG/M2 | TEMPERATURE: 97.3 F | WEIGHT: 136 LBS

## 2023-10-25 DIAGNOSIS — I10 PRIMARY HYPERTENSION: ICD-10-CM

## 2023-10-25 DIAGNOSIS — I10 PRIMARY HYPERTENSION: Primary | ICD-10-CM

## 2023-10-25 PROBLEM — J06.9 UPPER RESPIRATORY TRACT INFECTION: Status: RESOLVED | Noted: 2022-12-01 | Resolved: 2023-10-25

## 2023-10-25 PROCEDURE — 99213 OFFICE O/P EST LOW 20 MIN: CPT | Performed by: INTERNAL MEDICINE

## 2023-10-25 PROCEDURE — 1159F MED LIST DOCD IN RCRD: CPT | Performed by: INTERNAL MEDICINE

## 2023-10-25 PROCEDURE — 3078F DIAST BP <80 MM HG: CPT | Performed by: INTERNAL MEDICINE

## 2023-10-25 PROCEDURE — 3074F SYST BP LT 130 MM HG: CPT | Performed by: INTERNAL MEDICINE

## 2023-10-25 PROCEDURE — 1160F RVW MEDS BY RX/DR IN RCRD: CPT | Performed by: INTERNAL MEDICINE

## 2023-10-25 RX ORDER — LISINOPRIL 10 MG/1
10 TABLET ORAL EVERY 12 HOURS
Qty: 60 TABLET | Refills: 1 | Status: SHIPPED | OUTPATIENT
Start: 2023-10-25

## 2023-10-25 RX ORDER — LISINOPRIL 10 MG/1
10 TABLET ORAL EVERY 12 HOURS
Qty: 180 TABLET | OUTPATIENT
Start: 2023-10-25

## 2023-10-25 RX ORDER — CLINDAMYCIN HYDROCHLORIDE 300 MG/1
600 CAPSULE ORAL ONCE
COMMUNITY
Start: 2023-10-18

## 2023-10-25 NOTE — PROGRESS NOTES
Chief Complaint  Hypertension (F/u was seen in ER twice.)    Subjective          Fayeálvaro Yeboah presents to [facility] PRIMARY CARE  History of Present Illness    Hypertension-patient was seen in the emergency department on 10/1 and 10/4 with elevated blood pressure in the 160s/ range. She was started on Lisinopril 10 on 10/2. She was given clonidine to use if BP was very high and she has taken one time 3 days ago  She has a basal cell skin cancer and has received radiation for it and had noted some dizziness and elevated blood pressure readings which prompted the ER visits.    She did finish the radiation treatment about 2 weeks ago so in retrospect, she doesn't feel like that was related to the higher BP readings  In the emergency department, EKG and blood work, chest x-ray were all normal with only minimally elevated troponin.  The emergency department did start her on lisinopril 10 mg.  She was set up to see the heart valve clinic which she saw on 10/13.  She had been seeing Dr. Moore yearly and had had an echo done earlier this year which showed stable mild aortic insufficiency and mild MR/TR  Recent BP readings have been: 140/70s,155/96, 177/99 (3 days ago when she took the clonidine)  Has been a little better last few days  Arm felt funny a few mornings but no CP  No decongestants. Uses aleve occasionally but not often            Current Outpatient Medications:     atorvastatin (LIPITOR) 10 MG tablet, Take 1 tablet by mouth Daily., Disp: 90 tablet, Rfl: 1    Calcium Carb-Cholecalciferol (CALCIUM 600 + D PO), Take 1 capsule by mouth 2 (Two) Times a Day., Disp: , Rfl:     clindamycin (CLEOCIN) 300 MG capsule, 2 capsules 1 (One) Time. Take 2 capsules by mouth 1 hour before dental appointment., Disp: , Rfl:     cloNIDine (CATAPRES) 0.1 MG tablet, Take 1 tablet by mouth 2 (Two) Times a Day As Needed for High Blood Pressure (Systolic BP above 180 or Diastolic BP over 105)., Disp: 4  "tablet, Rfl: 0    lisinopril (PRINIVIL,ZESTRIL) 10 MG tablet, Take 1 tablet by mouth Every 12 (Twelve) Hours., Disp: 60 tablet, Rfl: 1   The following portions of the patient's history were reviewed and updated as appropriate: allergies, current medications, past family history, past medical history, past social history, past surgical history and problem list.     Objective   Vital Signs:   /76 (BP Location: Left arm, Patient Position: Sitting) Comment (BP Location): with her machine  Pulse 81   Temp 97.3 °F (36.3 °C) (Infrared)   Ht 162.6 cm (64\")   Wt 61.7 kg (136 lb)   SpO2 95%   BMI 23.34 kg/m²       Physical exam  Constitutional: oriented to person, place, and time.  well-developed and well-nourished. No distress.   HENT:   Head: Normocephalic and atraumatic.   Eyes: Conjunctivae and EOM are normal.   Cardiovascular: Normal rate, regular rhythm and normal heart sounds.  Exam reveals no gallop and no friction rub.  +jimbo  Pulmonary/Chest: Effort normal and breath sounds normal. No respiratory distress.   no wheezes.   Neurological:  alert and oriented to person, place, and time.   Abd: soft, NTND, no abd bruits  Skin: Skin is warm and dry. not diaphoretic.   PV: no edema  Psychiatric:  normal mood and affect. behavior is normal. Judgment and thought content normal.      Result Review :   The following data was reviewed by: Zulma Ghotra MD on 10/25/2023:  Common labs          9/29/2023    11:41 10/1/2023    19:41 10/4/2023    18:52   Common Labs   Glucose  116  109    BUN  16  16    Creatinine  0.69  0.66    Sodium  141  138    Potassium  4.6  4.3    Chloride  102  101    Calcium  9.6  9.1    Albumin  4.5  4.0    Total Bilirubin  0.5  0.5    Alkaline Phosphatase  70  63    AST (SGOT)  18  20    ALT (SGPT)  15  15    WBC  4.93  4.58    Hemoglobin  13.9  12.8    Hematocrit  43.0  39.4    Platelets  234  206    Hemoglobin A1C 5.5                    Assessment and Plan    Diagnoses and all orders for " this visit:    1. Primary hypertension (Primary)    Her BP cuff is about 14 points higher than ours today, though still high readings even considering that  We discussed DASH diet; avoid NSAIDS and decongestants  Walks regularly  Cardiology has instructed her to go up to bid on the lisinopril and she has f/u there next month      Follow Up   No follow-ups on file.  Patient was given instructions and counseling regarding her condition or for health maintenance advice. Please see specific information pulled into the AVS if appropriate.

## 2023-10-25 NOTE — TELEPHONE ENCOUNTER
Called patient to discuss recent increase in blood pressure.  Patient states that blood pressures consistently in the 160s/90's.  Patient is compliant with taking her lisinopril 10 mg daily.  Informed patient that we will increase lisinopril from 10 mg daily to 10 mg twice daily.  New prescription for medication sent in.  Of note, patient plans to see her primary care provider today at 3 PM.  Informed patient that if primary care would like to further adjust antihypertensives that that would be fine otherwise I would like to see her back in approximately 1 month to evaluate further hypertension.  Patient verbalized an understanding and will continue to monitor her blood pressure.

## 2023-11-30 NOTE — PROGRESS NOTES
Chief Complaint  Hypertension    Subjective      History of Present Illness {  Problem List  Visit  Diagnosis   Encounters  Notes  Medications  Labs  Result Review Imaging  Media :23}     Jacques Yeboah, 78 y.o. female with past medical history significant for hyperlipidemia, recent basal cell carcinoma, who presents to [facility] for Hypertension.  Since time of last evaluation, patient's lisinopril was increased to 10mg BID.     Since time of previous visit, patient has continued to monitor blood pressure which is currently well controlled running from 118-140 over 70's after taking medication. She currently denies chest pain/pressure, shortness of air, syncope, near syncope, lower extremity edema, and palpitations. She does endorses mild fatigue.       Initial presentation:  Patient recently has been undergoing radiation for her basal cell carcinoma located in her groin region and has been having hypertensive episodes.  Patient was evaluated in the ED on 10/1/2023 as well as 10/4/2023 both with episodes of hypertension.  EKG on 10/4 showed normal sinus rhythm, rate 66, normal EKG.  EKG on 10/1/2023 showed normal sinus rhythm, rate 68, possible left atrial enlargement.  MRI of the brain showed no acute intracranial hemorrhage, and no acute intracranial infarction, involutional and ischemic gliotic changes.  Chest x-ray has revealed no acute pulmonary process.  Troponin is noted to be only mildly elevated at 11, and 12.  Magnesium, CMP, and CBC noted to be unremarkable.    Of note, patient has recently been following with Dr. Moore.     Since time of evaluation in ED, patient denies chest pain or pressure, shortness of air, palpitations, syncope, near syncope, lower extremity edema, and worsening fatigue.  Patient is compliant with checking blood pressure at home and checks blood pressure multiple times a day.  Since addition of lisinopril by the ER provider, patient's  "blood pressure has been more adequately controlled.  Patient states that she has now completed all of her radiation treatments and that her oncologist did not believe that hypertension was related to these treatments.  Patient is typically very active and walks frequently for exercise.  She endorses minimal caffeine intake and states she drinks plenty of water.    Echocardiogram on 7/31/2023 at Dr. Moore's office:  EF 60%, right ventricle normal in size and function, there is mild aortic, mitral, and tricuspid regurgitation.    Objective     Vital Signs:   Vitals:    12/01/23 1051   BP: 123/58   BP Location: Left arm   Patient Position: Sitting   Cuff Size: Adult   Pulse: 71   Resp: 18   Temp: 98.3 °F (36.8 °C)   TempSrc: Temporal   SpO2: 98%   Weight: 62.4 kg (137 lb 9.6 oz)   Height: 165.1 cm (65\")       Body mass index is 22.9 kg/m².  Physical Exam  Vitals and nursing note reviewed.   Constitutional:       Appearance: Normal appearance.   HENT:      Head: Normocephalic.   Eyes:      Pupils: Pupils are equal, round, and reactive to light.   Cardiovascular:      Rate and Rhythm: Normal rate and regular rhythm.      Pulses: Normal pulses.      Heart sounds: Normal heart sounds. No murmur heard.  Pulmonary:      Effort: Pulmonary effort is normal.      Breath sounds: Normal breath sounds.   Abdominal:      General: Bowel sounds are normal.      Palpations: Abdomen is soft.   Musculoskeletal:         General: Normal range of motion.      Cervical back: Normal range of motion.      Right lower leg: No edema.      Left lower leg: No edema.   Skin:     General: Skin is warm and dry.      Capillary Refill: Capillary refill takes less than 2 seconds.   Neurological:      Mental Status: She is alert and oriented to person, place, and time.   Psychiatric:         Mood and Affect: Mood normal.         Thought Content: Thought content normal.                Data Reviewed:{ Labs  Result Review  Imaging  Med Tab  Media " :23}   Lab Results   Component Value Date    WBC 4.58 10/04/2023    HGB 12.8 10/04/2023    HCT 39.4 10/04/2023    MCV 96.3 10/04/2023     10/04/2023      Lab Results   Component Value Date    GLUCOSE 109 (H) 10/04/2023    BUN 16 10/04/2023    CREATININE 0.66 10/04/2023    EGFR 89.4 10/04/2023    BCR 24.2 10/04/2023    K 4.3 10/04/2023    CO2 29.0 10/04/2023    CALCIUM 9.1 10/04/2023    ALBUMIN 4.0 10/04/2023    BILITOT 0.5 10/04/2023    AST 20 10/04/2023    ALT 15 10/04/2023      Lab Results   Component Value Date    TROPONINT 12 (H) 10/04/2023      Lab Results   Component Value Date    TSH 2.040 10/01/2023      MRI Brain Without Contrast (10/04/2023 20:32)  XR Chest 1 View (10/04/2023 18:36)  XR Chest 1 View (10/01/2023 20:17)  ECG 12 Lead ED Triage Standing Order; Weak / Dizzy / AMS (10/04/2023 19:02)  ECG 12 Lead ED Triage Standing Order; Weak / Dizzy / AMS (10/01/2023 18:49)    Assessment & Plan   Assessment and Plan {CC Problem List  Visit Diagnosis  ROS  Review (Popup)  Health Maintenance  Quality  BestPractice  Medications  SmartSets  SnapShot Encounters  Media :23}     1. Primary hypertension  -Patient was instructed to take blood pressure only once a day.   Patient encouraged to be resting for 10 to 15 minutes prior to blood pressure reading.  She was encouraged to reach out to the heart and valve Center if blood pressures consistently greater than 140/90 or consistently less than 110 systolic.  -Continue lisinopril at 10 mg twice daily  -Patient would like to establish care with [facility].  I have referred her to Dr. Monteiro per patient request  -Patient encouraged to follow-up with heart and valve Center as needed or if symptoms change or worsen        Follow Up {Instructions Charge Capture  Follow-up Communications :23}     Return if symptoms worsen or fail to improve, for Hypertension.    Patient was given instructions and counseling regarding her condition or for health  maintenance advice. Please see specific information pulled into the AVS if appropriate.  Patient was instructed to call the Heart and Valve Center with any questions, concerns, or worsening symptoms.    Dictated Utilizing Dragon Dictation   Please note that portions of this note were completed with a voice recognition program.   Part of this note may be an electronic transcription/translation of spoken language to printed text using the Dragon Dictation System.

## 2023-12-01 ENCOUNTER — OFFICE VISIT (OUTPATIENT)
Dept: INTERNAL MEDICINE | Facility: CLINIC | Age: 78
End: 2023-12-01
Payer: MEDICARE

## 2023-12-01 ENCOUNTER — OFFICE VISIT (OUTPATIENT)
Dept: CARDIOLOGY | Facility: HOSPITAL | Age: 78
End: 2023-12-01
Payer: MEDICARE

## 2023-12-01 VITALS
SYSTOLIC BLOOD PRESSURE: 112 MMHG | WEIGHT: 135.2 LBS | BODY MASS INDEX: 22.53 KG/M2 | DIASTOLIC BLOOD PRESSURE: 62 MMHG | HEART RATE: 72 BPM | OXYGEN SATURATION: 97 % | TEMPERATURE: 96.8 F | HEIGHT: 65 IN

## 2023-12-01 VITALS
HEART RATE: 71 BPM | WEIGHT: 137.6 LBS | RESPIRATION RATE: 18 BRPM | DIASTOLIC BLOOD PRESSURE: 58 MMHG | OXYGEN SATURATION: 98 % | TEMPERATURE: 98.3 F | SYSTOLIC BLOOD PRESSURE: 123 MMHG | HEIGHT: 65 IN | BODY MASS INDEX: 22.92 KG/M2

## 2023-12-01 DIAGNOSIS — J40 BRONCHITIS: ICD-10-CM

## 2023-12-01 DIAGNOSIS — I10 PRIMARY HYPERTENSION: Primary | ICD-10-CM

## 2023-12-01 DIAGNOSIS — R05.1 ACUTE COUGH: Primary | ICD-10-CM

## 2023-12-01 LAB
EXPIRATION DATE: NORMAL
FLUAV AG UPPER RESP QL IA.RAPID: NOT DETECTED
FLUBV AG UPPER RESP QL IA.RAPID: NOT DETECTED
INTERNAL CONTROL: NORMAL
Lab: NORMAL
SARS-COV-2 AG UPPER RESP QL IA.RAPID: NOT DETECTED

## 2023-12-01 PROCEDURE — 3074F SYST BP LT 130 MM HG: CPT | Performed by: FAMILY MEDICINE

## 2023-12-01 PROCEDURE — 1160F RVW MEDS BY RX/DR IN RCRD: CPT | Performed by: FAMILY MEDICINE

## 2023-12-01 PROCEDURE — 1159F MED LIST DOCD IN RCRD: CPT | Performed by: FAMILY MEDICINE

## 2023-12-01 PROCEDURE — 3078F DIAST BP <80 MM HG: CPT | Performed by: FAMILY MEDICINE

## 2023-12-01 PROCEDURE — 99213 OFFICE O/P EST LOW 20 MIN: CPT | Performed by: FAMILY MEDICINE

## 2023-12-01 RX ORDER — CEFDINIR 300 MG/1
300 CAPSULE ORAL 2 TIMES DAILY
Qty: 20 CAPSULE | Refills: 0 | Status: SHIPPED | OUTPATIENT
Start: 2023-12-01

## 2023-12-01 RX ORDER — BENZONATATE 100 MG/1
100 CAPSULE ORAL 3 TIMES DAILY PRN
Qty: 30 CAPSULE | Refills: 0 | Status: SHIPPED | OUTPATIENT
Start: 2023-12-01 | End: 2023-12-11

## 2023-12-01 NOTE — PROGRESS NOTES
"    Office Note     Name: Jacques Yeboah    : 1945     MRN: [id number]     Chief Complaint  Cough (Going on since Tuesday and doesn't produce any mucus. She had some leftover tessalon pearls and took them to help. She does not believe she has COVID and declines getting tested) and Wheezing (Started noticing it this morning)    Subjective     History of Present Illness:  Jacques Yeboah is a 78 y.o. female who presents today for several concerns.  She states that she has had a cough productive of thick discolored sputum for the last several days and it seems to be getting worse and she had some Tessalon Perles leftover at home and she has been trying those and they do seem to help and she does not think she has had fever but she is concerned about infection.    Review of Systems   Respiratory:  Positive for cough. Negative for shortness of breath and wheezing.    Cardiovascular:  Negative for chest pain and palpitations.   Gastrointestinal:  Negative for blood in stool, diarrhea and vomiting.   Genitourinary:  Negative for dysuria, flank pain and genital sores.       Objective     Vital Signs  /62 (BP Location: Left arm, Patient Position: Sitting, Cuff Size: Adult)   Pulse 72   Temp 96.8 °F (36 °C) (Infrared)   Ht 165.1 cm (65\")   Wt 61.3 kg (135 lb 3.2 oz)   SpO2 97%   BMI 22.50 kg/m²   Estimated body mass index is 22.5 kg/m² as calculated from the following:    Height as of this encounter: 165.1 cm (65\").    Weight as of this encounter: 61.3 kg (135 lb 3.2 oz).    BMI is within normal parameters. No other follow-up for BMI required.      Physical Exam  Vitals and nursing note reviewed.   HENT:      Head: Normocephalic and atraumatic.      Jaw: There is normal jaw occlusion.      Salivary Glands: Right salivary gland is not diffusely enlarged or tender. Left salivary gland is not diffusely enlarged or tender.      Right Ear: Hearing, tympanic membrane, ear canal and external ear normal. "      Left Ear: Hearing, tympanic membrane, ear canal and external ear normal.      Nose: Nose normal.      Mouth/Throat:      Lips: Pink.      Mouth: Mucous membranes are moist.      Tongue: No lesions.      Palate: No lesions.      Pharynx: Oropharynx is clear.   Neck:      Vascular: No carotid bruit.   Cardiovascular:      Rate and Rhythm: Normal rate and regular rhythm.      Heart sounds: Normal heart sounds. No murmur heard.     No gallop.   Pulmonary:      Effort: Pulmonary effort is normal. No respiratory distress.      Breath sounds: No stridor. No wheezing, rhonchi or rales.   Musculoskeletal:      Cervical back: Normal range of motion and neck supple.      Right lower leg: No edema.      Left lower leg: No edema.   Lymphadenopathy:      Cervical: No cervical adenopathy.   Neurological:      Mental Status: She is alert.                 Assessment and Plan     Diagnoses and all orders for this visit:    1. Acute cough (Primary)  -     benzonatate (TESSALON) 100 MG capsule; Take 1 capsule by mouth 3 (Three) Times a Day As Needed for Cough for up to 10 days.  Dispense: 30 capsule; Refill: 0    2. Bronchitis  -     cefdinir (OMNICEF) 300 MG capsule; Take 1 capsule by mouth 2 (Two) Times a Day.  Dispense: 20 capsule; Refill: 0  -     benzonatate (TESSALON) 100 MG capsule; Take 1 capsule by mouth 3 (Three) Times a Day As Needed for Cough for up to 10 days.  Dispense: 30 capsule; Refill: 0          Follow Up  Prn with Dr. Brandin Harris,

## 2023-12-26 DIAGNOSIS — I10 PRIMARY HYPERTENSION: ICD-10-CM

## 2023-12-26 RX ORDER — LISINOPRIL 10 MG/1
10 TABLET ORAL EVERY 12 HOURS
Qty: 60 TABLET | Refills: 2 | Status: SHIPPED | OUTPATIENT
Start: 2023-12-26

## 2023-12-26 NOTE — TELEPHONE ENCOUNTER
Patient called requesting a refill of lisinopril. Patient last seen 12/1/23 in clinic by Aimee Esposito. After d/w Aimee will send in adequate supply of lisinopril to get through her scheduled appointment with Dr Monteiro 2/29/24.    Андрей Zee, PharmD, BCPS  12/26/2023  09:48 EST

## 2024-01-29 RX ORDER — ATORVASTATIN CALCIUM 10 MG/1
10 TABLET, FILM COATED ORAL DAILY
Qty: 90 TABLET | Refills: 0 | Status: SHIPPED | OUTPATIENT
Start: 2024-01-29

## 2024-01-29 NOTE — TELEPHONE ENCOUNTER
Caller: Jacques Yeboah    Relationship: Self    Best call back number: [phone]     Requested Prescriptions:   Requested Prescriptions     Pending Prescriptions Disp Refills    atorvastatin (LIPITOR) 10 MG tablet 90 tablet 1     Sig: Take 1 tablet by mouth Daily.        Pharmacy where request should be sent: [id number] [address]  AT [facility] [address] [place] - [phone] [facility] [phone]      Last office visit with prescribing clinician: 10/25/2023   Last telemedicine visit with prescribing clinician: Visit date not found   Next office visit with prescribing clinician: 3/14/2024     Additional details provided by patient: 9 PILLS ON HAND    Does the patient have less than a 3 day supply:  [] Yes  [x] No    Would you like a call back once the refill request has been completed: [] Yes [] No    If the office needs to give you a call back, can they leave a voicemail: [] Yes [] No    Todd Cowart Rep   01/29/24 08:38 EST

## 2024-02-20 ENCOUNTER — OFFICE VISIT (OUTPATIENT)
Dept: INTERNAL MEDICINE | Facility: CLINIC | Age: 79
End: 2024-02-20
Payer: MEDICARE

## 2024-02-20 VITALS
SYSTOLIC BLOOD PRESSURE: 102 MMHG | HEIGHT: 65 IN | HEART RATE: 72 BPM | TEMPERATURE: 97.6 F | WEIGHT: 135 LBS | OXYGEN SATURATION: 98 % | BODY MASS INDEX: 22.49 KG/M2 | RESPIRATION RATE: 18 BRPM | DIASTOLIC BLOOD PRESSURE: 58 MMHG

## 2024-02-20 DIAGNOSIS — R10.30 LOWER ABDOMINAL PAIN: ICD-10-CM

## 2024-02-20 DIAGNOSIS — K52.9 GASTROENTERITIS: ICD-10-CM

## 2024-02-20 DIAGNOSIS — I10 PRIMARY HYPERTENSION: ICD-10-CM

## 2024-02-20 DIAGNOSIS — R19.7 DIARRHEA IN ADULT PATIENT: Primary | ICD-10-CM

## 2024-02-20 LAB
BILIRUB BLD-MCNC: NEGATIVE MG/DL
CLARITY, POC: ABNORMAL
COLOR UR: YELLOW
EXPIRATION DATE: ABNORMAL
EXPIRATION DATE: NORMAL
FLUAV AG UPPER RESP QL IA.RAPID: NOT DETECTED
FLUBV AG UPPER RESP QL IA.RAPID: NOT DETECTED
GLUCOSE UR STRIP-MCNC: NEGATIVE MG/DL
INTERNAL CONTROL: NORMAL
KETONES UR QL: NEGATIVE
LEUKOCYTE EST, POC: ABNORMAL
Lab: ABNORMAL
Lab: NORMAL
NITRITE UR-MCNC: NEGATIVE MG/ML
PH UR: 6 [PH] (ref 5–8)
PROT UR STRIP-MCNC: ABNORMAL MG/DL
RBC # UR STRIP: NEGATIVE /UL
SARS-COV-2 AG UPPER RESP QL IA.RAPID: NOT DETECTED
SP GR UR: 1.01 (ref 1–1.03)
UROBILINOGEN UR QL: NORMAL

## 2024-02-20 PROCEDURE — 3078F DIAST BP <80 MM HG: CPT | Performed by: NURSE PRACTITIONER

## 2024-02-20 PROCEDURE — 3074F SYST BP LT 130 MM HG: CPT | Performed by: NURSE PRACTITIONER

## 2024-02-20 PROCEDURE — 99214 OFFICE O/P EST MOD 30 MIN: CPT | Performed by: NURSE PRACTITIONER

## 2024-02-20 PROCEDURE — 87086 URINE CULTURE/COLONY COUNT: CPT | Performed by: NURSE PRACTITIONER

## 2024-02-20 PROCEDURE — 1159F MED LIST DOCD IN RCRD: CPT | Performed by: NURSE PRACTITIONER

## 2024-02-20 PROCEDURE — 1160F RVW MEDS BY RX/DR IN RCRD: CPT | Performed by: NURSE PRACTITIONER

## 2024-02-20 PROCEDURE — 87428 SARSCOV & INF VIR A&B AG IA: CPT | Performed by: NURSE PRACTITIONER

## 2024-02-20 PROCEDURE — 81003 URINALYSIS AUTO W/O SCOPE: CPT | Performed by: NURSE PRACTITIONER

## 2024-02-20 RX ORDER — DICYCLOMINE HYDROCHLORIDE 10 MG/1
10 CAPSULE ORAL 3 TIMES DAILY PRN
Qty: 30 CAPSULE | Refills: 0 | Status: SHIPPED | OUTPATIENT
Start: 2024-02-20

## 2024-02-20 NOTE — PROGRESS NOTES
Subjective   Jacques Yeboah is a 78 y.o. female.     Chief Complaint   Patient presents with    Diarrhea     Started Sunday    Abdominal Pain     Pressure, lower, feels like something is squeezing her    Fatigue    Back Pain     lower       PCP: Zulma Ghotra MD    History of Present Illness   The patient is a 78-year-old female who is here to be evaluated for fatigue, abdominal pain, and diarrhea that started on Sunday, 02/18/2024. She also has some pain in the low back. She feels a little pressure in the lower abdomen.    The patient states she woke up on Sunday morning with diarrhea. She had 2 or 3 episodes where she felt something tight throughout her stomach. On Monday, she was weak. She ate cereal and toast. She walked outside yesterday and felt good. She did not have diarrhea yesterday, 02/19/2024. When she woke up this morning at 6 a.m., she heard some bowel sounds. She denies any urinary symptoms such as urgency, frequency, or burning to urinate. She denies any blood or mucus in the diarrhea. She has not been on any antibiotics recently. She denies any sick contacts. She denies any history of diverticulitis. She denies any cramping in the abdomen. She denies any nausea or vomiting.    She thought her blood pressure was low. She did not take her lisinopril today. Her blood pressure normally runs 130, 135, and 140 systolic .    She had bronchitis a couple of times last year.    The following portions of the patient's history were reviewed and updated as appropriate: allergies, current medications, past family history, past medical history, past social history, past surgical history and problem list.    POC Urinalysis Dipstick, Automated (02/20/2024 09:59)       Review of Systems   Constitutional:  Positive for fatigue.   Gastrointestinal:  Positive for abdominal pain and diarrhea. Negative for blood in stool, nausea and vomiting.   Genitourinary:  Negative for frequency and urgency.           No  "outpatient medications have been marked as taking for the 2/20/24 encounter (Office Visit) with Malia Fowler, APRN.     No Known Allergies        Objective   Physical Exam  Constitutional:       Appearance: Normal appearance. She is well-developed.   HENT:      Head: Normocephalic and atraumatic.   Eyes:      General: No scleral icterus.     Conjunctiva/sclera: Conjunctivae normal.   Cardiovascular:      Rate and Rhythm: Normal rate and regular rhythm.      Heart sounds: Normal heart sounds.   Pulmonary:      Effort: Pulmonary effort is normal. No respiratory distress.      Breath sounds: Normal breath sounds.   Abdominal:      General: Bowel sounds are normal.      Palpations: Abdomen is soft.      Tenderness: There is no abdominal tenderness. There is no right CVA tenderness or left CVA tenderness.   Musculoskeletal:         General: Normal range of motion.      Cervical back: Normal range of motion.      Right lower leg: No edema.      Left lower leg: No edema.   Skin:     General: Skin is warm and dry.   Neurological:      General: No focal deficit present.      Mental Status: She is alert and oriented to person, place, and time.   Psychiatric:         Attention and Perception: Attention normal.         Mood and Affect: Mood and affect normal.         Behavior: Behavior normal. Behavior is cooperative.         Thought Content: Thought content normal.         Cognition and Memory: Cognition normal.         Judgment: Judgment normal.         Vitals:    02/20/24 0914   BP: 102/58   BP Location: Right arm   Patient Position: Sitting   Cuff Size: Adult   Pulse: 72   Resp: 18   Temp: 97.6 °F (36.4 °C)   TempSrc: Infrared   SpO2: 98%   Weight: 61.2 kg (135 lb)   Height: 165.1 cm (65\")     Body mass index is 22.47 kg/m².  Wt Readings from Last 3 Encounters:   02/20/24 61.2 kg (135 lb)   12/01/23 61.3 kg (135 lb 3.2 oz)   12/01/23 62.4 kg (137 lb 9.6 oz)             Assessment & Plan   Diagnoses and all orders for " this visit:    1. Diarrhea in adult patient (Primary)  -     dicyclomine (BENTYL) 10 MG capsule; Take 1 capsule by mouth 3 (Three) Times a Day As Needed for Abdominal Cramping.  Dispense: 30 capsule; Refill: 0  -     POCT SARS-CoV-2 Antigen RAGHAV + Flu    2. Gastroenteritis  -     dicyclomine (BENTYL) 10 MG capsule; Take 1 capsule by mouth 3 (Three) Times a Day As Needed for Abdominal Cramping.  Dispense: 30 capsule; Refill: 0    3. Lower abdominal pain  -     dicyclomine (BENTYL) 10 MG capsule; Take 1 capsule by mouth 3 (Three) Times a Day As Needed for Abdominal Cramping.  Dispense: 30 capsule; Refill: 0  -     POC Urinalysis Dipstick, Automated    4. Primary hypertension    Diarrhea/gastroenteritis.  Blood pressure is slightly low today without her lisinopril. She typically has a blood pressure around 130 before she takes her medication. I encouraged bland diet, increase fluids, and increase electrolyte replacement solutions as well as long as having diarrhea. I will send in dicyclomine for symptomatic management. She will follow up if symptoms not improving over the next couple of days. If she has any questions, she can reach out to us and let us know.    Hypertension.  She will continue to hold lisinopril until blood pressure returns to 130 mmHg premedications and then she can resume that.  BMI is within normal parameters. No other follow-up for BMI required.      Return if symptoms worsen or fail to improve.    I discussed my findings,recommendations, and plan of care was with the patient. They verbalized understanding and agreement.  Patient was encouraged to keep me informed of any acute changes, lack of improvement, or any new concerning symptoms.     Transcribed from ambient dictation for XI [place] by Stevie Croft.  02/20/24   11:45 EST    Patient or patient representative verbalized consent to the visit recording.  I have personally performed the services described in this document as  transcribed by the above individual, and it is both accurate and complete.  Malia Fowler, XI  2/20/2024  12:02 EST

## 2024-02-21 LAB — BACTERIA SPEC AEROBE CULT: NORMAL

## 2024-02-28 NOTE — PROGRESS NOTES
New Cardiology Patient Office Visit      Date: 2024  Patient Name: Jacques Yeboah  : 1945   MRN: [id number]   PCP: Zulma Ghotra MD   Referring Provider: Aimee Esposito APRN     Chief Complaint:    Chief Complaint   Patient presents with    Primary hypertension    Hyperlipidemia       History of Present Illness: Jacques Yeboah is a 78 y.o. female who is here today for set up as a cardiologist.  Patient has a history of high blood pressure and cholesterol with mild valvular heart disease.  Patient is able to do all her activities.  In the last few months patient woke up with feeling of some shortness of breath and had to come to the hospital.  Patient was found to have a high blood pressure and was adjusted for her medications.  She has that episode again and her blood pressure was still high.  Patient has been having off-and-on high blood pressures again.  She is feeling much better.  She denies any chest pain any shortness of breath any dizziness any palpitations or any lower extremity edema.  She is able to do all her activities without any problem.      Problem List   CARDIAC  Coronary Artery Disease:   Low risk     Myocardium:   Echo, 2023: LVEF 60%     Valvular:   Mild AI, mild MR, TR     Electrical:   Normal sinus rhythm     Pericardium:   Normal       CARDIAC RISK FACTORS:  Hypertension  Dyslipidemia  3/3/2023:  TG 54 HDL 87 LDL 58    NON-CARDIAC  Arthritis  Celiac disease  Osteopenia    SURGERIES  Right hip replacement  Cataract surgery  Tonsillectomy  Total abdominal ligation      Subjective      Review of Systems:   Review of Systems   Respiratory: Negative.  Negative for apnea, cough, choking, chest tightness, shortness of breath, wheezing and stridor.    Cardiovascular: Negative.  Negative for chest pain, palpitations and leg swelling.       Medications:   Current Outpatient Medications   Medication Sig Dispense Refill    atorvastatin (LIPITOR) 10 MG tablet  "Take 1 tablet by mouth Daily. 90 tablet 0    Calcium Carb-Cholecalciferol (CALCIUM 600 + D PO) Take 1 capsule by mouth 2 (Two) Times a Day.      clindamycin (CLEOCIN) 300 MG capsule Take 2 capsules by mouth As Needed (before teath cleaning). Take 2 capsules by mouth 1 hour before dental appointment.      cloNIDine (CATAPRES) 0.1 MG tablet Take 1 tablet by mouth 2 (Two) Times a Day As Needed for High Blood Pressure (Systolic BP above 180 or Diastolic BP over 105). 4 tablet 0    dicyclomine (BENTYL) 10 MG capsule Take 1 capsule by mouth 3 (Three) Times a Day As Needed for Abdominal Cramping. 30 capsule 0    lisinopril (PRINIVIL,ZESTRIL) 10 MG tablet Take 1 tablet by mouth Every 12 (Twelve) Hours. 60 tablet 2     No current facility-administered medications for this visit.           The following portions of the patient's history were reviewed and updated as appropriate: allergies, current medications, past family history, past medical history, past social history, past surgical history and problem list.    Objective     Physical Exam:  Vital Signs:   Vitals:    02/29/24 1008 02/29/24 1021   BP: 146/74 132/72   BP Location: Right arm Left arm   Patient Position: Sitting Sitting   Cuff Size: Adult Adult   Pulse: 77 77   SpO2: 98% 97%   Weight: 61.3 kg (135 lb 3.2 oz)    Height: 165.1 cm (65\")      Body mass index is 22.5 kg/m².     Constitutional:       General: Not in acute distress.     Appearance: Healthy appearance. Not in distress.     Neck:     JVP: Not elevated     Carotid artery: No carotid bruit    Pulmonary:      Effort: Pulmonary effort is normal.      Breath sounds: Normal breath sounds. No wheezing. No rhonchi. No rales.     Cardiovascular:      Normal rate. Regular rhythm. Normal S1. Normal S2.      Murmurs: There is diastolic murmur.      No gallop. No click. No rub.     Abdominal:      General: Bowel sounds are normal.      Palpations: Abdomen is soft.      Tenderness: There is no abdominal " tenderness.    Extremities:     Pulses: Good distal pulses     Edema: No edema    Labs:  Lab Results   Component Value Date    GLUCOSE 109 (H) 10/04/2023    BUN 16 10/04/2023    CREATININE 0.66 10/04/2023    EGFRIFNONA 93 02/07/2022    BCR 24.2 10/04/2023    K 4.3 10/04/2023    CO2 29.0 10/04/2023    CALCIUM 9.1 10/04/2023    ALBUMIN 4.0 10/04/2023    AST 20 10/04/2023    ALT 15 10/04/2023     Lab Results   Component Value Date    WBC 4.58 10/04/2023    HGB 12.8 10/04/2023    HCT 39.4 10/04/2023    MCV 96.3 10/04/2023     10/04/2023     Lab Results   Component Value Date    CHOL 156 03/03/2023    CHLPL 160 11/30/2015    TRIG 54 03/03/2023    HDL 87 (H) 03/03/2023    LDL 58 03/03/2023     Lab Results   Component Value Date    TSH 2.040 10/01/2023     Lab Results   Component Value Date    HGBA1C 5.5 09/29/2023           ECG 12 Lead    Date/Time: 2/29/2024 12:59 PM  Performed by: Mehran Monteiro MD    Authorized by: Mehran Monteiro MD  Comparison: compared with previous ECG from 7/31/2023  Rhythm: sinus rhythm    Clinical impression: normal ECG        Smoking Cessation:   Tobacco Product History : Patient never smoked    Advance Care Planning   ACP discussion was held with the patient during this visit. Patient has an advance directive in EMR which is still valid.             Assessment / Plan      Assessment:   Diagnosis Plan   1. Primary hypertension        2. Familial hypercholesterolemia        3. Nonrheumatic aortic valve insufficiency             Plan:  Patient blood pressure has been running high.  We will add diltiazem 120 mg daily for control of blood pressure and heart rate.  Patient has been taking Lipitor and apparently her cholesterol has been in good control.  Patient last echo showed mild aortic insufficiency with mild MR.  Since there is no progression of the disease in the last few years, I believe we will wait for few years before repeating her next echo.            Follow Up:   Return in  about 6 months (around 8/29/2024).    Mehran Monteiro MD

## 2024-02-29 ENCOUNTER — OFFICE VISIT (OUTPATIENT)
Dept: CARDIOLOGY | Facility: CLINIC | Age: 79
End: 2024-02-29
Payer: MEDICARE

## 2024-02-29 ENCOUNTER — TELEPHONE (OUTPATIENT)
Dept: INTERNAL MEDICINE | Facility: CLINIC | Age: 79
End: 2024-02-29
Payer: MEDICARE

## 2024-02-29 VITALS
HEIGHT: 65 IN | BODY MASS INDEX: 22.53 KG/M2 | DIASTOLIC BLOOD PRESSURE: 72 MMHG | WEIGHT: 135.2 LBS | SYSTOLIC BLOOD PRESSURE: 132 MMHG | HEART RATE: 77 BPM | OXYGEN SATURATION: 97 %

## 2024-02-29 DIAGNOSIS — E78.01 FAMILIAL HYPERCHOLESTEROLEMIA: Chronic | ICD-10-CM

## 2024-02-29 DIAGNOSIS — I35.1 NONRHEUMATIC AORTIC VALVE INSUFFICIENCY: ICD-10-CM

## 2024-02-29 DIAGNOSIS — I10 PRIMARY HYPERTENSION: Primary | ICD-10-CM

## 2024-02-29 PROCEDURE — 1160F RVW MEDS BY RX/DR IN RCRD: CPT | Performed by: INTERNAL MEDICINE

## 2024-02-29 PROCEDURE — 3075F SYST BP GE 130 - 139MM HG: CPT | Performed by: INTERNAL MEDICINE

## 2024-02-29 PROCEDURE — 3078F DIAST BP <80 MM HG: CPT | Performed by: INTERNAL MEDICINE

## 2024-02-29 PROCEDURE — 93000 ELECTROCARDIOGRAM COMPLETE: CPT | Performed by: INTERNAL MEDICINE

## 2024-02-29 PROCEDURE — 1159F MED LIST DOCD IN RCRD: CPT | Performed by: INTERNAL MEDICINE

## 2024-02-29 PROCEDURE — 99204 OFFICE O/P NEW MOD 45 MIN: CPT | Performed by: INTERNAL MEDICINE

## 2024-02-29 RX ORDER — DILTIAZEM HYDROCHLORIDE 120 MG/1
120 CAPSULE, EXTENDED RELEASE ORAL DAILY
Qty: 90 CAPSULE | Refills: 3 | Status: SHIPPED | OUTPATIENT
Start: 2024-02-29

## 2024-02-29 NOTE — TELEPHONE ENCOUNTER
Your urine culture looked ok   Written by XI Springer on 2/26/2024 10:16 AM EST    Pt advised of result. Verbalized good understanding and appreciation.

## 2024-03-11 NOTE — PROGRESS NOTES
Subsequent Medicare Wellness Visit    Subjective    Jacques Yeboah is a 79 y.o. female who presents for a Subsequent Medicare Wellness Visit.    The following portions of the patient's history were reviewed and   updated as appropriate: allergies, current medications, past family history, past medical history, past social history, past surgical history, and problem list.    Compared to one year ago, the patient feels her physical   health is the same.    Compared to one year ago, the patient feels her mental   health is the same.    Recent Hospitalizations:  She was not admitted to the hospital during the last year.       Current Medical Providers:  Patient Care Team:  Zulma Ghotra MD as PCP - General (Internal Medicine)  Miguel Moore MD as Consulting Physician (Cardiology)  Tor Le MD as Consulting Physician (Gastroenterology)  Anthony Lopez OD (Optometry)  Prem Hodges MD as Consulting Physician (Orthopedic Surgery)  Prem Stein DPM (Podiatry)  Dawson Donohue MD as Consulting Physician (Dermatology)  Dr. Mic Castellanos (Dental General Practice)  Jaspreet Raya MD as Consulting Physician (Colon and Rectal Surgery)    Outpatient Medications Prior to Visit   Medication Sig Dispense Refill    atorvastatin (LIPITOR) 10 MG tablet Take 1 tablet by mouth Daily. 90 tablet 0    Calcium Carb-Cholecalciferol (CALCIUM 600 + D PO) Take 1 capsule by mouth 2 (Two) Times a Day.      dilTIAZem XR (DILACOR XR) 120 MG 24 hr capsule Take 1 capsule by mouth Daily. 90 capsule 3    lisinopril (PRINIVIL,ZESTRIL) 10 MG tablet Take 1 tablet by mouth Every 12 (Twelve) Hours. 60 tablet 2    cloNIDine (CATAPRES) 0.1 MG tablet Take 1 tablet by mouth 2 (Two) Times a Day As Needed for High Blood Pressure (Systolic BP above 180 or Diastolic BP over 105). (Patient not taking: Reported on 3/14/2024) 4 tablet 0    dicyclomine (BENTYL) 10 MG capsule Take 1 capsule by mouth 3 (Three) Times a Day As  "Needed for Abdominal Cramping. (Patient not taking: Reported on 3/14/2024) 30 capsule 0    clindamycin (CLEOCIN) 300 MG capsule Take 2 capsules by mouth As Needed (before teath cleaning). Take 2 capsules by mouth 1 hour before dental appointment.       No facility-administered medications prior to visit.       No opioid medication identified on active medication list. I have reviewed chart for other potential  high risk medication/s and harmful drug interactions in the elderly.          Aspirin is not on active medication list.  Aspirin use is not indicated based on review of current medical condition/s. Risk of harm outweighs potential benefits.  .    Patient Active Problem List   Diagnosis    Hyperlipidemia    Arthritis of right hip    Status post total replacement of right hip    Celiac disease    Primary osteoarthritis of left hip    Medical orders for life-sustaining treatment (MOLST) form in chart    Elevated glucose    Primary hypertension     Advance Care Planning  Advance Directive is not on file.  ACP discussion was held with the patient during this visit. Pt has and is UTD; MOST renewed today     Objective    Vitals:    03/14/24 0836   BP: 136/74   BP Location: Left arm   Patient Position: Sitting   Cuff Size: Adult   Pulse: 64   Resp: 14   Temp: 96.9 °F (36.1 °C)   TempSrc: Infrared   SpO2: 99%   Weight: 61.2 kg (135 lb)   Height: 163.1 cm (64.21\")     Estimated body mass index is 23.02 kg/m² as calculated from the following:    Height as of this encounter: 163.1 cm (64.21\").    Weight as of this encounter: 61.2 kg (135 lb).    BMI is >= 25 and <30. (Overweight) The following options were offered after discussion;: BMI is normal      Does the patient have evidence of cognitive impairment? No          HEALTH RISK ASSESSMENT    Smoking Status:  Social History     Tobacco Use   Smoking Status Never    Passive exposure: Never   Smokeless Tobacco Never     Alcohol Consumption:  Social History     Substance " and Sexual Activity   Alcohol Use Never     Fall Risk Screen:    EUGENIO Fall Risk Assessment was completed, and patient is at LOW risk for falls.Assessment completed on:3/14/2024    Depression Screening:      3/14/2024     8:35 AM   PHQ-2/PHQ-9 Depression Screening   Little Interest or Pleasure in Doing Things 0-->not at all   Feeling Down, Depressed or Hopeless 0-->not at all   PHQ-9: Brief Depression Severity Measure Score 0       Health Habits and Functional and Cognitive Screening:      3/14/2024     8:30 AM   Functional & Cognitive Status   Do you have difficulty preparing food and eating? No   Do you have difficulty bathing yourself, getting dressed or grooming yourself? No   Do you have difficulty using the toilet? No   Do you have difficulty moving around from place to place? No   Do you have trouble with steps or getting out of a bed or a chair? No   Current Diet Well Balanced Diet   Dental Exam Up to date   Eye Exam Up to date   Exercise (times per week) 2 times per week   Current Exercises Include Light Weights;Walking   Do you need help using the phone?  No   Are you deaf or do you have serious difficulty hearing?  No   Do you need help to go to places out of walking distance? No   Do you need help shopping? No   Do you need help preparing meals?  No   Do you need help with housework?  No   Do you need help with laundry? No   Do you need help taking your medications? No   Do you need help managing money? No   Do you ever drive or ride in a car without wearing a seat belt? No   Have you felt unusual stress, anger or loneliness in the last month? No   Who do you live with? Alone   If you need help, do you have trouble finding someone available to you? No   Have you been bothered in the last four weeks by sexual problems? No   Do you have difficulty concentrating, remembering or making decisions? No       Age-appropriate Screening Schedule:  Refer to the list below for future screening recommendations based  on patient's age, sex and/or medical conditions. Orders for these recommended tests are listed in the plan section. The patient has been provided with a written plan.    Health Maintenance   Topic Date Due    RSV Vaccine - Adults (1 - 1-dose 60+ series) Never done    MOST FORM  06/16/2023    COVID-19 Vaccine (3 - 2023-24 season) 09/01/2023    ANNUAL WELLNESS VISIT  03/03/2024    LIPID PANEL  03/03/2024    HEMOGLOBIN A1C  03/29/2024    DIABETIC EYE EXAM  09/26/2024    COLORECTAL CANCER SCREENING  09/17/2029    TDAP/TD VACCINES (2 - Td or Tdap) 08/21/2032    INFLUENZA VACCINE  Completed    Pneumococcal Vaccine 65+  Completed    ZOSTER VACCINE  Completed    URINE MICROALBUMIN  Discontinued    DXA SCAN  Discontinued                CMS Preventative Services Quick Reference  Risk Factors Identified During Encounter  None Identified  The above risks/problems have been discussed with the patient.  Pertinent information has been shared with the patient in the After Visit Summary.  An After Visit Summary and PPPS were made available to the patient.    Follow Up:   Next Medicare Wellness visit to be scheduled in 1 year.       Additional E&M Note during same encounter follows:  Patient has multiple medical problems which are significant and separately identifiable that require additional work above and beyond the Medicare Wellness Visit.      Chief Complaint  Annual Exam (BP has been elevated )    Subjective        HPI  Jacques MOISES Yeboah is also being seen today for:      HTN - she is on lisinopril 10 and diltiazem was also started 2 weeks ago. BPs still in 130s range    Hyperlipidemia-she is on lipitor 10.  She is tolerating. She is fasting today     Celiac disease-she follows gluten free diet for the most part     Elevated glucose-A1c had been slightly elevated at 6.2, but had improved to 5.5 in 9/23. She has been consciously trying to cut back on sweets and carbohydrates.she did meet w/ dietician which was helpful    "  Exercise: walks regualrly and does balance exercises and core exercise class at [place]  Diet:healthy overall - lives in independent living but has meals at the assisted living which do have a lot of carbohydrates like potatoes generally.  gluten free. Does ca/vit D and wondering if she should stop Ca sypplement.  milk w/ meals, likes dairy      Review of Systems   Constitutional:  Negative for activity change, appetite change, fatigue and fever.   Respiratory:  Negative for shortness of breath.    Cardiovascular:  Negative for chest pain and leg swelling.   Gastrointestinal:  Negative for constipation. Anal bleeding: uses miralax daily and stays regualr.  Allergic/Immunologic: Negative for immunocompromised state.   Neurological:  Negative for seizures, syncope, speech difficulty, weakness and confusion.       Objective   Vital Signs:  /74 (BP Location: Left arm, Patient Position: Sitting, Cuff Size: Adult)   Pulse 64   Temp 96.9 °F (36.1 °C) (Infrared)   Resp 14   Ht 163.1 cm (64.21\")   Wt 61.2 kg (135 lb)   SpO2 99%   BMI 23.02 kg/m²     Physical Exam  Vitals and nursing note reviewed.   Constitutional:       General: She is not in acute distress.     Appearance: She is well-developed. She is not diaphoretic.   HENT:      Head: Normocephalic and atraumatic.      Right Ear: External ear normal.      Left Ear: External ear normal.      Nose: Nose normal.      Mouth/Throat:      Pharynx: No oropharyngeal exudate.   Eyes:      General: No scleral icterus.        Right eye: No discharge.         Left eye: No discharge.      Conjunctiva/sclera: Conjunctivae normal.      Pupils: Pupils are equal, round, and reactive to light.   Neck:      Thyroid: No thyromegaly.   Cardiovascular:      Rate and Rhythm: Normal rate and regular rhythm.      Heart sounds: Murmur heard.      No friction rub. No gallop.   Pulmonary:      Effort: Pulmonary effort is normal. No respiratory distress.      Breath sounds: Normal " breath sounds. No wheezing or rales.   Chest:   Breasts:     Right: No mass, nipple discharge, skin change or tenderness.      Left: No mass, nipple discharge, skin change or tenderness.   Abdominal:      General: Bowel sounds are normal. There is no distension.      Palpations: Abdomen is soft. There is no mass.      Tenderness: There is no abdominal tenderness. There is no guarding or rebound.   Musculoskeletal:         General: No deformity. Normal range of motion.      Cervical back: Normal range of motion and neck supple.   Lymphadenopathy:      Cervical: No cervical adenopathy.   Skin:     General: Skin is warm and dry.      Coloration: Skin is not pale.      Findings: No erythema or rash.   Neurological:      Mental Status: She is alert and oriented to person, place, and time.      Coordination: Coordination normal.      Deep Tendon Reflexes: Reflexes normal.   Psychiatric:         Behavior: Behavior normal.         Thought Content: Thought content normal.         Judgment: Judgment normal.                         Assessment and Plan   Diagnoses and all orders for this visit:    1. Medicare annual wellness visit, subsequent (Primary)  Regular exercise/healthy diet. BSE q month. Sunscreen use encouraged.   [facility] scheduled 10/24  Colon due' 29 - probably won't do any more  living will-patient has and is on file  Pneumovax-had  prevnar-had  DT due in '32  DEXA due but she declines .  shingles-had Shingrix  Does not need paps since age 65 or older and has had normal paps in past  MOST form reviewed with patient and signed this year.  We will review it again in a year.  She kept original     2. Elevated glucose  -     Hemoglobin A1c; Future    3. Pure hypercholesterolemia  -     CBC (No Diff); Future  -     TSH Rfx On Abnormal To Free T4; Future  -     Lipid Panel; Future  -     Comprehensive Metabolic Panel; Future    4. Primary hypertension  -     CBC (No Diff); Future  -     TSH Rfx On Abnormal To Free T4;  Future  -     Lipid Panel; Future  -     Comprehensive Metabolic Panel; Future    5. Celiac disease- follows gluten free diet    6. Vitamin D deficiency  -     Vitamin D,25-Hydroxy; Future                 Follow Up   No follow-ups on file.  Patient was given instructions and counseling regarding her condition or for health maintenance advice. Please see specific information pulled into the AVS if appropriate.

## 2024-03-14 ENCOUNTER — LAB (OUTPATIENT)
Dept: INTERNAL MEDICINE | Facility: CLINIC | Age: 79
End: 2024-03-14
Payer: MEDICARE

## 2024-03-14 ENCOUNTER — OFFICE VISIT (OUTPATIENT)
Dept: INTERNAL MEDICINE | Facility: CLINIC | Age: 79
End: 2024-03-14
Payer: MEDICARE

## 2024-03-14 VITALS
SYSTOLIC BLOOD PRESSURE: 136 MMHG | BODY MASS INDEX: 23.05 KG/M2 | HEART RATE: 64 BPM | WEIGHT: 135 LBS | RESPIRATION RATE: 14 BRPM | TEMPERATURE: 96.9 F | DIASTOLIC BLOOD PRESSURE: 74 MMHG | OXYGEN SATURATION: 99 % | HEIGHT: 64 IN

## 2024-03-14 DIAGNOSIS — I10 PRIMARY HYPERTENSION: ICD-10-CM

## 2024-03-14 DIAGNOSIS — E55.9 VITAMIN D DEFICIENCY: ICD-10-CM

## 2024-03-14 DIAGNOSIS — Z00.00 MEDICARE ANNUAL WELLNESS VISIT, SUBSEQUENT: Primary | ICD-10-CM

## 2024-03-14 DIAGNOSIS — K90.0 CELIAC DISEASE: ICD-10-CM

## 2024-03-14 DIAGNOSIS — E78.00 PURE HYPERCHOLESTEROLEMIA: Chronic | ICD-10-CM

## 2024-03-14 DIAGNOSIS — R73.09 ELEVATED GLUCOSE: ICD-10-CM

## 2024-03-14 LAB
25(OH)D3 SERPL-MCNC: 52.9 NG/ML (ref 30–100)
ALBUMIN SERPL-MCNC: 4.6 G/DL (ref 3.5–5.2)
ALBUMIN/GLOB SERPL: 1.7 G/DL
ALP SERPL-CCNC: 91 U/L (ref 39–117)
ALT SERPL W P-5'-P-CCNC: 24 U/L (ref 1–33)
ANION GAP SERPL CALCULATED.3IONS-SCNC: 13 MMOL/L (ref 5–15)
AST SERPL-CCNC: 21 U/L (ref 1–32)
BILIRUB SERPL-MCNC: 0.4 MG/DL (ref 0–1.2)
BUN SERPL-MCNC: 21 MG/DL (ref 8–23)
BUN/CREAT SERPL: 24.7 (ref 7–25)
CALCIUM SPEC-SCNC: 9.7 MG/DL (ref 8.6–10.5)
CHLORIDE SERPL-SCNC: 99 MMOL/L (ref 98–107)
CHOLEST SERPL-MCNC: 166 MG/DL (ref 0–200)
CO2 SERPL-SCNC: 28 MMOL/L (ref 22–29)
CREAT SERPL-MCNC: 0.85 MG/DL (ref 0.57–1)
DEPRECATED RDW RBC AUTO: 42.1 FL (ref 37–54)
EGFRCR SERPLBLD CKD-EPI 2021: 69.4 ML/MIN/1.73
ERYTHROCYTE [DISTWIDTH] IN BLOOD BY AUTOMATED COUNT: 12.3 % (ref 12.3–15.4)
GLOBULIN UR ELPH-MCNC: 2.7 GM/DL
GLUCOSE SERPL-MCNC: 101 MG/DL (ref 65–99)
HBA1C MFR BLD: 5.9 % (ref 4.8–5.6)
HCT VFR BLD AUTO: 42.4 % (ref 34–46.6)
HDLC SERPL-MCNC: 78 MG/DL (ref 40–60)
HGB BLD-MCNC: 14 G/DL (ref 12–15.9)
LDLC SERPL CALC-MCNC: 79 MG/DL (ref 0–100)
LDLC/HDLC SERPL: 1.02 {RATIO}
MCH RBC QN AUTO: 30.9 PG (ref 26.6–33)
MCHC RBC AUTO-ENTMCNC: 33 G/DL (ref 31.5–35.7)
MCV RBC AUTO: 93.6 FL (ref 79–97)
PLATELET # BLD AUTO: 260 10*3/MM3 (ref 140–450)
PMV BLD AUTO: 9.9 FL (ref 6–12)
POTASSIUM SERPL-SCNC: 3.8 MMOL/L (ref 3.5–5.2)
PROT SERPL-MCNC: 7.3 G/DL (ref 6–8.5)
RBC # BLD AUTO: 4.53 10*6/MM3 (ref 3.77–5.28)
SODIUM SERPL-SCNC: 140 MMOL/L (ref 136–145)
TRIGL SERPL-MCNC: 43 MG/DL (ref 0–150)
TSH SERPL DL<=0.05 MIU/L-ACNC: 1.57 UIU/ML (ref 0.27–4.2)
VLDLC SERPL-MCNC: 9 MG/DL (ref 5–40)
WBC NRBC COR # BLD AUTO: 4.65 10*3/MM3 (ref 3.4–10.8)

## 2024-03-14 PROCEDURE — 1160F RVW MEDS BY RX/DR IN RCRD: CPT | Performed by: INTERNAL MEDICINE

## 2024-03-14 PROCEDURE — 84443 ASSAY THYROID STIM HORMONE: CPT | Performed by: INTERNAL MEDICINE

## 2024-03-14 PROCEDURE — 3078F DIAST BP <80 MM HG: CPT | Performed by: INTERNAL MEDICINE

## 2024-03-14 PROCEDURE — 36415 COLL VENOUS BLD VENIPUNCTURE: CPT | Performed by: INTERNAL MEDICINE

## 2024-03-14 PROCEDURE — 1159F MED LIST DOCD IN RCRD: CPT | Performed by: INTERNAL MEDICINE

## 2024-03-14 PROCEDURE — 80053 COMPREHEN METABOLIC PANEL: CPT | Performed by: INTERNAL MEDICINE

## 2024-03-14 PROCEDURE — 1170F FXNL STATUS ASSESSED: CPT | Performed by: INTERNAL MEDICINE

## 2024-03-14 PROCEDURE — 82306 VITAMIN D 25 HYDROXY: CPT | Performed by: INTERNAL MEDICINE

## 2024-03-14 PROCEDURE — 85027 COMPLETE CBC AUTOMATED: CPT | Performed by: INTERNAL MEDICINE

## 2024-03-14 PROCEDURE — 3075F SYST BP GE 130 - 139MM HG: CPT | Performed by: INTERNAL MEDICINE

## 2024-03-14 PROCEDURE — 80061 LIPID PANEL: CPT | Performed by: INTERNAL MEDICINE

## 2024-03-14 PROCEDURE — G0439 PPPS, SUBSEQ VISIT: HCPCS | Performed by: INTERNAL MEDICINE

## 2024-03-14 PROCEDURE — 83036 HEMOGLOBIN GLYCOSYLATED A1C: CPT | Performed by: INTERNAL MEDICINE

## 2024-03-25 ENCOUNTER — TELEPHONE (OUTPATIENT)
Dept: INTERNAL MEDICINE | Facility: CLINIC | Age: 79
End: 2024-03-25
Payer: MEDICARE

## 2024-03-25 NOTE — TELEPHONE ENCOUNTER
Caller: Sanya Yeboah    Relationship: Self    Best call back number: [phone]     Caller requesting test results: THE PATIENT SANYA    What test was performed: LABS     When was the test performed: 03/14/2024    Where was the test performed: AT THE PRACTICE     Additional notes: THE PATIENT IS REQUESTING A CALL BACK WITH RESULTS, AND IS ALSO CHECKING ON THE STATUS OF GETTING A HARD COPY OF HER LABS MAILED TO HER HOME.    PLEASE CALL AND ADVISE.

## 2024-03-27 DIAGNOSIS — I10 PRIMARY HYPERTENSION: ICD-10-CM

## 2024-03-27 RX ORDER — LISINOPRIL 10 MG/1
10 TABLET ORAL EVERY 12 HOURS
Qty: 180 TABLET | Refills: 3 | Status: SHIPPED | OUTPATIENT
Start: 2024-03-27

## 2024-03-28 NOTE — TELEPHONE ENCOUNTER
Left message on voicemail for patient letting her know a lab letter was put in the mail to her.  If she does not receive in the next week to call the office and let the office know.    HUB to relay:    If patient calls back let her know the above message.

## 2024-04-12 ENCOUNTER — TELEPHONE (OUTPATIENT)
Dept: CARDIOLOGY | Facility: CLINIC | Age: 79
End: 2024-04-12
Payer: MEDICARE

## 2024-04-12 NOTE — TELEPHONE ENCOUNTER
Pt called with blood pressure log.   PT has been on diltiazem for one month  HR 70-80s  3/27  127/74  3/28  125/72  3/29  142/70  3/30  117/62  3/31  134/75  4/1  129/69  4/2  107/61  139/71  4/3  146/76  120/57  4/5  136/57  4/6  122/69  4/7  145/73  4/8  138/71  4/9  110/66  4/10  121/71  4/11  107/65  127/73  Told patient these are great blood pressures, if she starts to notice her BP consistently stay above 130-140 systolic to call me. Pt verbalizes understanding.

## 2024-04-29 ENCOUNTER — OFFICE VISIT (OUTPATIENT)
Dept: ORTHOPEDIC SURGERY | Facility: CLINIC | Age: 79
End: 2024-04-29
Payer: MEDICARE

## 2024-04-29 VITALS
WEIGHT: 133 LBS | DIASTOLIC BLOOD PRESSURE: 70 MMHG | SYSTOLIC BLOOD PRESSURE: 116 MMHG | HEIGHT: 64 IN | BODY MASS INDEX: 22.71 KG/M2

## 2024-04-29 DIAGNOSIS — M79.672 LEFT FOOT PAIN: Primary | ICD-10-CM

## 2024-04-29 DIAGNOSIS — M19.079 ARTHRITIS OF FIRST MTP JOINT: ICD-10-CM

## 2024-04-29 DIAGNOSIS — M76.812 ANTERIOR TIBIALIS TENDINITIS, LEFT: ICD-10-CM

## 2024-04-29 DIAGNOSIS — E78.00 PURE HYPERCHOLESTEROLEMIA: Primary | ICD-10-CM

## 2024-04-29 DIAGNOSIS — M20.22 HALLUX RIGIDUS OF LEFT FOOT: ICD-10-CM

## 2024-04-29 PROCEDURE — 3078F DIAST BP <80 MM HG: CPT | Performed by: PHYSICIAN ASSISTANT

## 2024-04-29 PROCEDURE — 3074F SYST BP LT 130 MM HG: CPT | Performed by: PHYSICIAN ASSISTANT

## 2024-04-29 PROCEDURE — 1159F MED LIST DOCD IN RCRD: CPT | Performed by: PHYSICIAN ASSISTANT

## 2024-04-29 PROCEDURE — 1160F RVW MEDS BY RX/DR IN RCRD: CPT | Performed by: PHYSICIAN ASSISTANT

## 2024-04-29 PROCEDURE — 99213 OFFICE O/P EST LOW 20 MIN: CPT | Performed by: PHYSICIAN ASSISTANT

## 2024-04-29 RX ORDER — ATORVASTATIN CALCIUM 10 MG/1
10 TABLET, FILM COATED ORAL DAILY
Qty: 90 TABLET | Refills: 1 | Status: SHIPPED | OUTPATIENT
Start: 2024-04-29

## 2024-04-29 NOTE — TELEPHONE ENCOUNTER
Caller: Jacques Yeboah    Relationship: Self    Best call back number: [phone]     Requested Prescriptions:   Requested Prescriptions     Pending Prescriptions Disp Refills    atorvastatin (LIPITOR) 10 MG tablet 90 tablet 0     Sig: Take 1 tablet by mouth Daily.        Pharmacy where request should be sent:  [id number] [address]  AT [address] - [phone] [facility] [phone] FX      Last office visit with prescribing clinician: 3/14/2024   Last telemedicine visit with prescribing clinician: Visit date not found   Next office visit with prescribing clinician: 9/12/2024     Additional details provided by patient:     Does the patient have less than a 3 day supply:  [] Yes  [x] No    Would you like a call back once the refill request has been completed: [x] Yes [] No    If the office needs to give you a call back, can they leave a voicemail: [x] Yes [] No    Todd Baker Rep   04/29/24 09:07 EDT

## 2024-04-29 NOTE — PROGRESS NOTES
"    [facility]        Subjective     CC: Pain of the Left Foot      HPI    Jacques Yeboah is a 79 y.o. female.  Established patient presents for evaluation of left foot pain localized anterior medial ankle into foot.  Symptoms/pain have been ongoing 8 to 10 days.  MARIAN: Does not recall any specific injury but has been starting to do range of motion exercises requiring plantar and dorsiflexion when she noticed symptoms starting.  She is used to walking 20 minutes daily but had to stop due to pain.    Pain scale: 4/10.  Severity of the pain moderate.  Quality of the pain burning.  Associated symptoms pain.  Activity related to pain walking.  Pain eased by resting.  No reported numbness or tingling.  Prior treatments none so far other than no longer going on her daily walks.      ROS:    Constiutional:Pt denies fever, chills, nausea, or vomiting.  MSK:as above        Objective      Past Medical History  Past Medical History:   Diagnosis Date    Arthritis     Arthritis of back Long history    Celiac disease     Fracture of wrist Aug 21 2022    Boxer’s fravture    H/O bone density study     Hip pain     History of colonoscopy     CSGA    History of mammogram 08/13/2020    Inflammatory arthritis     Lumbago     Lumbar degenerative disc disease     Metatarsalgia, left foot     Osteoarthritis of knees, bilateral     Pelvic fracture     Primary osteoarthritis of both hips     Sciatica     Thoracic spondylosis      Social History     Socioeconomic History    Marital status:    Tobacco Use    Smoking status: Never     Passive exposure: Never    Smokeless tobacco: Never   Vaping Use    Vaping status: Never Used   Substance and Sexual Activity    Alcohol use: Never    Drug use: Never    Sexual activity: Not Currently     Partners: Male     Comment: Pill for 9 years          Physical Exam  /70   Ht 163.1 cm (64.21\")   Wt 60.3 kg (133 lb)   LMP  (LMP Unknown)   BMI 22.68 kg/m² "     Body mass index is 22.68 kg/m².    Patient is well nourished and well developed.        Ortho Exam  V:  Dorsalis Pedis:  Left:2+    Posterior Tibial: Left:2+    Capillary Refill:  Brisk  MSK:      Tibia:  Left:  non tender and normal motor function      Ankle:  Left:  non tender, ROM  normal, and motor function  patient notes pain with plantar and dorsiflexion of the ankle      Foot:  Left:  tender along anterior tibialis tendon as it traverses the ankle into its insertion, ROM  normal, and motor function  painful but able to perform      NEURO: Heel Walking:  Right:  normal; Left:  unable to test too much pain    Toe Walking:  Right:  normal; Left:  unable to test too much pain    [place] 5.07 monofilament test: normal    Lower extremity sensation: intact     Calf Atrophy:none    Motor Function:  4/5 PF secondary to pain; 5/5 rest    Great toe  No tenderness to MTP  Very limited motion       Imaging/Labs/EMG Reviewed and Interpreted:  Ordered left foot/ankle plain films.  Imaging read/interpreted by Dr. Justin.    Imaging Results (Last 24 Hours)       Procedure Component Value Units Date/Time    XR Foot 3+ View Left [[id number]] Resulted: 04/29/24 1501     Updated: 04/29/24 1501    Narrative:      Standing AP, lateral, oblique left foot, ordered for pain, shows some   narrowing of the central TMT's, severe hallux rigidus, no fractures, no   comparison              Assessment:  1. Left foot pain    2. Anterior tibialis tendinitis, left    3. Arthritis of first MTP joint    4. Hallux rigidus of left foot        Plan:  Left foot pain, anterior tibialis tendin tendinitis.  Arthritis left first MTP, hallux rigidus--currently asymptomatic.  Reviewed imaging with the patient.  Explained to the patient her condition and treatment.  Explained that this is difficult to treat and it takes a long time for it to heal.  At this time surgical intervention is not warranted.  Placed into a tall pneumatic boot which is  to be worn at all times including sleeping in it.  Recommend OTC NSAIDS/pain medication as needed.  Follow up in 6 weeks for repeat evaluation. Anticipate at that point continue boot for additional 6 weeks (but not wearing it at night) and starting formal PT.  Questions and concerns answered.    Answers submitted by the patient for this visit:  Other (Submitted on 4/27/2024)  Please describe your symptoms.: Left foot pain  Have you had these symptoms before?: No  How long have you been having these symptoms?: 5-7 days  Please list any medications you are currently taking for this condition.: None  Please describe any probable cause for these symptoms. : Exercises maybe  Primary Reason for Visit (Submitted on 4/27/2024)  What is the primary reason for your visit?: Other      Francine Vicente PA-C  04/30/24  12:18 EDT      Dictated Utilizing Dragon Dictation.

## 2024-04-29 NOTE — TELEPHONE ENCOUNTER
Called patient to notify that her prescription has been refilled. She communicated understanding and was thankful.

## 2024-04-29 NOTE — TELEPHONE ENCOUNTER
Rx Refill Note  Requested Prescriptions     Pending Prescriptions Disp Refills    atorvastatin (LIPITOR) 10 MG tablet 90 tablet 1     Sig: Take 1 tablet by mouth Daily.      Last office visit with prescribing clinician: 3/14/2024     Next office visit with prescribing clinician: 9/12/2024     Valerie Pathak  04/29/24, 09:33 EDT

## 2024-06-10 ENCOUNTER — OFFICE VISIT (OUTPATIENT)
Dept: ORTHOPEDIC SURGERY | Facility: CLINIC | Age: 79
End: 2024-06-10
Payer: MEDICARE

## 2024-06-10 VITALS
HEIGHT: 64 IN | SYSTOLIC BLOOD PRESSURE: 108 MMHG | DIASTOLIC BLOOD PRESSURE: 60 MMHG | BODY MASS INDEX: 22.88 KG/M2 | WEIGHT: 134 LBS

## 2024-06-10 DIAGNOSIS — M20.22 HALLUX RIGIDUS OF LEFT FOOT: ICD-10-CM

## 2024-06-10 DIAGNOSIS — M19.079 ARTHRITIS OF FIRST MTP JOINT: ICD-10-CM

## 2024-06-10 DIAGNOSIS — M79.672 LEFT FOOT PAIN: Primary | ICD-10-CM

## 2024-06-10 DIAGNOSIS — M76.812 ANTERIOR TIBIALIS TENDINITIS, LEFT: ICD-10-CM

## 2024-06-10 NOTE — PROGRESS NOTES
"    [facility]        Subjective     CC: Follow-up (6 week follow up - Left foot pain )      HPI    Jacques Yeboah is a 79 y.o. female.  Patient returns today for follow-up left ankle/foot pain, anterior tibialis tendinitis.  She reports that she is doing much better with improvement in her pain with wearing of the boot.  She states after thinking about it she believes her pain started after trying to balance herself while trimming.    Pain scale: 2/10.    Overall, patient's symptoms are improved.    ROS:    Constiutional:Pt denies fever, chills, nausea, or vomiting.  MSK:as above        Objective      Past Medical History  Past Medical History:   Diagnosis Date    Arthritis     Arthritis of back Long history    Celiac disease     Fracture of wrist Aug 21 2022    Boxer’s fravture    H/O bone density study     Hip pain     History of colonoscopy     CSGA    History of mammogram 08/13/2020    Inflammatory arthritis     Lumbago     Lumbar degenerative disc disease     Metatarsalgia, left foot     Osteoarthritis of knees, bilateral     Pelvic fracture     Primary osteoarthritis of both hips     Sciatica     Thoracic spondylosis      Social History     Socioeconomic History    Marital status:    Tobacco Use    Smoking status: Never     Passive exposure: Never    Smokeless tobacco: Never   Vaping Use    Vaping status: Never Used   Substance and Sexual Activity    Alcohol use: Never    Drug use: Never    Sexual activity: Not Currently     Partners: Male     Comment: Pill for 9 years          Physical Exam  /60   Ht 163.1 cm (64.21\")   Wt 60.8 kg (134 lb)   LMP  (LMP Unknown)   BMI 22.85 kg/m²     Body mass index is 22.85 kg/m².    Patient is well nourished and well developed.        Ortho Exam  Left ankle/foot  Skin: Grossly intact without redness, warmth or swelling.  Tenderness: No significant tenderness on exam along tibialis anterior tendon.  (No tenderness to first " MTP.  Motion: Full range of motion of the ankle, pain-free.  Motor/sensory: Remain grossly intact L2-S1.      Imaging/Labs/EMG Reviewed and Interpreted:  No new imaging today.      Assessment:  1. Left foot pain    2. Anterior tibialis tendinitis, left    3. Arthritis of first MTP joint    4. Hallux rigidus of left foot        Plan:  Left foot pain due to tibialis anterior tendinitis improved with use of boot.  Patient now to wear boot during the day but not at night.  Placed referral for formal PT.  Arthritis/hallux rigidus MTP--continue with observation as currently asymptomatic.  Recommend OTC NSAIDs/pain medication as needed.  Follow-up in 6 weeks.  Questions and concerns answered.      Francine Vicente PA-C  06/10/24  14:58 EDT      Dictated Utilizing Dragon Dictation.

## 2024-07-22 ENCOUNTER — OFFICE VISIT (OUTPATIENT)
Dept: ORTHOPEDIC SURGERY | Facility: CLINIC | Age: 79
End: 2024-07-22
Payer: MEDICARE

## 2024-07-22 VITALS
DIASTOLIC BLOOD PRESSURE: 74 MMHG | WEIGHT: 134 LBS | HEIGHT: 64 IN | SYSTOLIC BLOOD PRESSURE: 110 MMHG | BODY MASS INDEX: 22.88 KG/M2

## 2024-07-22 DIAGNOSIS — M76.812 ANTERIOR TIBIALIS TENDINITIS, LEFT: Primary | ICD-10-CM

## 2024-07-22 PROCEDURE — 99212 OFFICE O/P EST SF 10 MIN: CPT | Performed by: PHYSICIAN ASSISTANT

## 2024-07-22 PROCEDURE — 1160F RVW MEDS BY RX/DR IN RCRD: CPT | Performed by: PHYSICIAN ASSISTANT

## 2024-07-22 PROCEDURE — 1159F MED LIST DOCD IN RCRD: CPT | Performed by: PHYSICIAN ASSISTANT

## 2024-07-22 PROCEDURE — 3074F SYST BP LT 130 MM HG: CPT | Performed by: PHYSICIAN ASSISTANT

## 2024-07-22 PROCEDURE — 3078F DIAST BP <80 MM HG: CPT | Performed by: PHYSICIAN ASSISTANT

## 2024-07-22 NOTE — PROGRESS NOTES
"    [facility]        Subjective     CC: Follow-up (6 week f/u -- Left foot pain )      HPI    Jacques Yeboah is a 79 y.o. female.  Patient returns for follow-up left anterior tibialis tendinitis.  She been participating in PT and wearing her boot as directed.  She has no new complaints or issues.    Pain scale: 0-1/10--depends on how active she is.  States PT is starting to work on more strengthening, balance and has begun transitioning her out of her boot into a shoe.    Overall, patient's symptoms are improved.    ROS:    Constiutional:Pt denies fever, chills, nausea, or vomiting.  MSK:as above        Objective      Past Medical History  Past Medical History:   Diagnosis Date    Arthritis     Arthritis of back Long history    Celiac disease     Fracture of wrist Aug 21 2022    Boxer’s fravture    H/O bone density study     Hip pain     History of colonoscopy     CSGA    History of mammogram 08/13/2020    Inflammatory arthritis     Lumbago     Lumbar degenerative disc disease     Metatarsalgia, left foot     Osteoarthritis of knees, bilateral     Pelvic fracture     Primary osteoarthritis of both hips     Sciatica     Thoracic spondylosis      Social History     Socioeconomic History    Marital status:    Tobacco Use    Smoking status: Never     Passive exposure: Never    Smokeless tobacco: Never   Vaping Use    Vaping status: Never Used   Substance and Sexual Activity    Alcohol use: Never    Drug use: Never    Sexual activity: Not Currently     Partners: Male     Comment: Pill for 9 years          Physical Exam  /74   Ht 163.1 cm (64.21\")   Wt 60.8 kg (134 lb)   LMP  (LMP Unknown)   BMI 22.85 kg/m²     Body mass index is 22.85 kg/m².    Patient is well nourished and well developed.        Ortho Exam  Left ankle/foot  Skin: Grossly intact without redness, warmth or swelling.  Tenderness: None.  Motion: Full range of motion of the ankle, pain-free.  Motor/sensory: " Remain grossly intact L2-S1.      Imaging/Labs/EMG Reviewed and Interpreted:  No new imaging today.      Assessment:  1. Anterior tibialis tendinitis, left        Plan:  Left anterior tibialis tendinitis improved.  Continue transitioning to regular shoes.  Continue working with PT until she is met their discharge criteria.  Recommend OTC NSAIDs/pain medication as needed.  Follow-up as needed.  Questions and concerns answered.      Francine Vicente PA-C  07/22/24  20:49 EDT      Dictated Utilizing Dragon Dictation.

## 2024-07-26 ENCOUNTER — TELEPHONE (OUTPATIENT)
Dept: INTERNAL MEDICINE | Facility: CLINIC | Age: 79
End: 2024-07-26
Payer: MEDICARE

## 2024-07-26 ENCOUNTER — APPOINTMENT (OUTPATIENT)
Dept: GENERAL RADIOLOGY | Facility: HOSPITAL | Age: 79
End: 2024-07-26
Payer: MEDICARE

## 2024-07-26 ENCOUNTER — HOSPITAL ENCOUNTER (EMERGENCY)
Facility: HOSPITAL | Age: 79
Discharge: HOME | End: 2024-07-26
Attending: EMERGENCY MEDICINE
Payer: MEDICARE

## 2024-07-26 ENCOUNTER — APPOINTMENT (OUTPATIENT)
Dept: CT IMAGING | Facility: HOSPITAL | Age: 79
End: 2024-07-26
Payer: MEDICARE

## 2024-07-26 VITALS
RESPIRATION RATE: 16 BRPM | TEMPERATURE: 98 F | DIASTOLIC BLOOD PRESSURE: 75 MMHG | BODY MASS INDEX: 22.49 KG/M2 | SYSTOLIC BLOOD PRESSURE: 138 MMHG | HEIGHT: 65 IN | HEART RATE: 67 BPM | OXYGEN SATURATION: 99 % | WEIGHT: 135 LBS

## 2024-07-26 DIAGNOSIS — R51.9 ACUTE NONINTRACTABLE HEADACHE, UNSPECIFIED HEADACHE TYPE: Primary | ICD-10-CM

## 2024-07-26 DIAGNOSIS — R53.1 GENERALIZED WEAKNESS: ICD-10-CM

## 2024-07-26 DIAGNOSIS — I67.1 CEREBRAL ANEURYSM: ICD-10-CM

## 2024-07-26 LAB
ALBUMIN SERPL-MCNC: 4.1 G/DL (ref 3.5–5.2)
ALBUMIN/GLOB SERPL: 1.4 G/DL
ALP SERPL-CCNC: 81 U/L (ref 39–117)
ALT SERPL W P-5'-P-CCNC: 14 U/L (ref 1–33)
ANION GAP SERPL CALCULATED.3IONS-SCNC: 5 MMOL/L (ref 5–15)
APPEARANCE CSF: CLEAR
AST SERPL-CCNC: 18 U/L (ref 1–32)
BASOPHILS # BLD AUTO: 0.02 10*3/MM3 (ref 0–0.2)
BASOPHILS NFR BLD AUTO: 0.5 % (ref 0–1.5)
BILIRUB SERPL-MCNC: 0.4 MG/DL (ref 0–1.2)
BILIRUB UR QL STRIP: NEGATIVE
BUN SERPL-MCNC: 16 MG/DL (ref 8–23)
BUN/CREAT SERPL: 22.2 (ref 7–25)
C GATTII+NEOFOR DNA CSF QL NAA+NON-PROBE: NOT DETECTED
CALCIUM SPEC-SCNC: 9.8 MG/DL (ref 8.6–10.5)
CHLORIDE SERPL-SCNC: 99 MMOL/L (ref 98–107)
CLARITY UR: CLEAR
CMV DNA CSF QL NAA+PROBE: NOT DETECTED
CO2 SERPL-SCNC: 32 MMOL/L (ref 22–29)
COLOR CSF: COLORLESS
COLOR SPUN CSF: COLORLESS
COLOR UR: YELLOW
CREAT SERPL-MCNC: 0.72 MG/DL (ref 0.57–1)
DEPRECATED RDW RBC AUTO: 47.2 FL (ref 37–54)
E COLI K1 DNA CSF QL NAA+NON-PROBE: NOT DETECTED
EGFRCR SERPLBLD CKD-EPI 2021: 84.6 ML/MIN/1.73
EOSINOPHIL # BLD AUTO: 0.04 10*3/MM3 (ref 0–0.4)
EOSINOPHIL NFR BLD AUTO: 1 % (ref 0.3–6.2)
ERYTHROCYTE [DISTWIDTH] IN BLOOD BY AUTOMATED COUNT: 13.2 % (ref 12.3–15.4)
EV RNA CSF QL NAA+PROBE: NOT DETECTED
FLUAV SUBTYP SPEC NAA+PROBE: NOT DETECTED
FLUBV RNA ISLT QL NAA+PROBE: NOT DETECTED
GLOBULIN UR ELPH-MCNC: 2.9 GM/DL
GLUCOSE CSF-MCNC: 60 MG/DL (ref 40–70)
GLUCOSE SERPL-MCNC: 97 MG/DL (ref 65–99)
GLUCOSE UR STRIP-MCNC: NEGATIVE MG/DL
GP B STREP DNA SPEC QL NAA+PROBE: NOT DETECTED
HAEM INFLU SEROTYP DNA SPEC NAA+PROBE: NOT DETECTED
HCT VFR BLD AUTO: 41.7 % (ref 34–46.6)
HGB BLD-MCNC: 13.4 G/DL (ref 12–15.9)
HGB UR QL STRIP.AUTO: NEGATIVE
HHV6 DNA CSF QL NAA+PROBE: NOT DETECTED
HOLD SPECIMEN: NORMAL
HSV1 DNA CSF QL NAA+PROBE: NOT DETECTED
HSV2 DNA CSF QL NAA+PROBE: NOT DETECTED
IMM GRANULOCYTES # BLD AUTO: 0.01 10*3/MM3 (ref 0–0.05)
IMM GRANULOCYTES NFR BLD AUTO: 0.3 % (ref 0–0.5)
KETONES UR QL STRIP: NEGATIVE
L MONOCYTOG RRNA SPEC QL PROBE: NOT DETECTED
LEUKOCYTE ESTERASE UR QL STRIP.AUTO: NEGATIVE
LYMPHOCYTES # BLD AUTO: 0.86 10*3/MM3 (ref 0.7–3.1)
LYMPHOCYTES NFR BLD AUTO: 21.6 % (ref 19.6–45.3)
MCH RBC QN AUTO: 30.9 PG (ref 26.6–33)
MCHC RBC AUTO-ENTMCNC: 32.1 G/DL (ref 31.5–35.7)
MCV RBC AUTO: 96.1 FL (ref 79–97)
MONOCYTES # BLD AUTO: 0.35 10*3/MM3 (ref 0.1–0.9)
MONOCYTES NFR BLD AUTO: 8.8 % (ref 5–12)
N MEN DNA SPEC QL NAA+PROBE: NOT DETECTED
NEUTROPHILS NFR BLD AUTO: 2.71 10*3/MM3 (ref 1.7–7)
NEUTROPHILS NFR BLD AUTO: 67.8 % (ref 42.7–76)
NITRITE UR QL STRIP: NEGATIVE
NRBC BLD AUTO-RTO: 0 /100 WBC (ref 0–0.2)
PARECHOVIRUS A RNA CSF QL NAA+NON-PROBE: NOT DETECTED
PH UR STRIP.AUTO: 8 [PH] (ref 5–8)
PLATELET # BLD AUTO: 237 10*3/MM3 (ref 140–450)
PMV BLD AUTO: 9.7 FL (ref 6–12)
POTASSIUM SERPL-SCNC: 4.5 MMOL/L (ref 3.5–5.2)
PROT CSF-MCNC: 41.7 MG/DL (ref 15–45)
PROT SERPL-MCNC: 7 G/DL (ref 6–8.5)
PROT UR QL STRIP: NEGATIVE
RBC # BLD AUTO: 4.34 10*6/MM3 (ref 3.77–5.28)
RBC # CSF MANUAL: 4 /MM3 (ref 0–5)
S PNEUM DNA CSF QL NAA+NON-PROBE: NOT DETECTED
SARS-COV-2 RNA RESP QL NAA+PROBE: NOT DETECTED
SODIUM SERPL-SCNC: 136 MMOL/L (ref 136–145)
SP GR UR STRIP: 1.01 (ref 1–1.03)
SPECIMEN VOL CSF: 9.5 ML
TROPONIN T SERPL HS-MCNC: 11 NG/L
TUBE # CSF: 4
UROBILINOGEN UR QL STRIP: NORMAL
VZV DNA CSF QL NAA+PROBE: NOT DETECTED
WBC # CSF MANUAL: 0 /MM3 (ref 0–5)
WBC NRBC COR # BLD AUTO: 3.99 10*3/MM3 (ref 3.4–10.8)

## 2024-07-26 PROCEDURE — 87015 SPECIMEN INFECT AGNT CONCNTJ: CPT | Performed by: EMERGENCY MEDICINE

## 2024-07-26 PROCEDURE — 81003 URINALYSIS AUTO W/O SCOPE: CPT | Performed by: EMERGENCY MEDICINE

## 2024-07-26 PROCEDURE — 70450 CT HEAD/BRAIN W/O DYE: CPT

## 2024-07-26 PROCEDURE — 99285 EMERGENCY DEPT VISIT HI MDM: CPT

## 2024-07-26 PROCEDURE — 87070 CULTURE OTHR SPECIMN AEROBIC: CPT | Performed by: EMERGENCY MEDICINE

## 2024-07-26 PROCEDURE — 89050 BODY FLUID CELL COUNT: CPT | Performed by: EMERGENCY MEDICINE

## 2024-07-26 PROCEDURE — 87075 CULTR BACTERIA EXCEPT BLOOD: CPT | Performed by: EMERGENCY MEDICINE

## 2024-07-26 PROCEDURE — 87205 SMEAR GRAM STAIN: CPT | Performed by: EMERGENCY MEDICINE

## 2024-07-26 PROCEDURE — 70496 CT ANGIOGRAPHY HEAD: CPT

## 2024-07-26 PROCEDURE — 87483 CNS DNA AMP PROBE TYPE 12-25: CPT | Performed by: EMERGENCY MEDICINE

## 2024-07-26 PROCEDURE — 80053 COMPREHEN METABOLIC PANEL: CPT | Performed by: EMERGENCY MEDICINE

## 2024-07-26 PROCEDURE — 82945 GLUCOSE OTHER FLUID: CPT | Performed by: EMERGENCY MEDICINE

## 2024-07-26 PROCEDURE — 25510000001 IOPAMIDOL PER 1 ML: Performed by: EMERGENCY MEDICINE

## 2024-07-26 PROCEDURE — 87636 SARSCOV2 & INF A&B AMP PRB: CPT | Performed by: EMERGENCY MEDICINE

## 2024-07-26 PROCEDURE — 85025 COMPLETE CBC W/AUTO DIFF WBC: CPT | Performed by: EMERGENCY MEDICINE

## 2024-07-26 PROCEDURE — 84484 ASSAY OF TROPONIN QUANT: CPT | Performed by: EMERGENCY MEDICINE

## 2024-07-26 PROCEDURE — 93005 ELECTROCARDIOGRAM TRACING: CPT | Performed by: EMERGENCY MEDICINE

## 2024-07-26 PROCEDURE — 84157 ASSAY OF PROTEIN OTHER: CPT | Performed by: EMERGENCY MEDICINE

## 2024-07-26 RX ADMIN — IOPAMIDOL 75 ML: 755 INJECTION, SOLUTION INTRAVENOUS at 12:27

## 2024-07-26 NOTE — ED PROVIDER NOTES
[place]    EMERGENCY DEPARTMENT ENCOUNTER      Pt Name: Jacques Yeboah  MRN: [id number]  YOB: 1945  Date of evaluation: 7/26/2024  Provider: Mohit Goldman MD    CHIEF COMPLAINT       Chief Complaint   Patient presents with    Headache         HISTORY OF PRESENT ILLNESS   Jacques Yeboah is a 79 y.o. female who presents to the emergency department with complaint of general fatigue and occipital headache over the course of the past day.  Patient states that she has felt more tired than normal and yesterday had dull aching headache in the occipital region that was nonradiating and not associated with any neck pain or stiffness, visual changes, peripheral paresthesias, weakness, numbness, or any difficulty/change in ambulation.  She has not had any fever or chills.  She denies any chest pain, shortness of breath, abdominal pain, vomiting, diarrhea, or urinary symptoms.  She said that she felt slightly better last night before bed but headache and weakness persisted today.      Nursing notes were reviewed.    REVIEW OF SYSTEMS     ROS:  A chief complaint appropriate review of systems was completed and is negative except as noted in the HPI.      PAST MEDICAL HISTORY     Past Medical History:   Diagnosis Date    Arthritis     Arthritis of back Long history    Celiac disease     Fracture of wrist Aug 21 2022    Boxer’s fravture    H/O bone density study     Hip pain     History of colonoscopy     CSGA    History of mammogram 08/13/2020    Inflammatory arthritis     Lumbago     Lumbar degenerative disc disease     Metatarsalgia, left foot     Osteoarthritis of knees, bilateral     Pelvic fracture     Primary osteoarthritis of both hips     Sciatica     Thoracic spondylosis          SURGICAL HISTORY       Past Surgical History:   Procedure Laterality Date    COLONOSCOPY      CSGA    EYE SURGERY      FRACTURE SURGERY      left wrist; pelvis    JOINT REPLACEMENT  2017    TONSILLECTOMY      TOTAL HIP  ARTHROPLASTY Right 10/03/2017    Procedure: TOTAL HIP ARTHROPLASTY RIGHT;  Surgeon: Prem Hodges MD;  Location: [facility];  Service:     TUBAL ABDOMINAL LIGATION           CURRENT MEDICATIONS     No current facility-administered medications for this encounter.    Current Outpatient Medications:     atorvastatin (LIPITOR) 10 MG tablet, Take 1 tablet by mouth Daily., Disp: 90 tablet, Rfl: 1    Calcium Carb-Cholecalciferol (CALCIUM 600 + D PO), Take 1 capsule by mouth 2 (Two) Times a Day., Disp: , Rfl:     cloNIDine (CATAPRES) 0.1 MG tablet, Take 1 tablet by mouth 2 (Two) Times a Day As Needed for High Blood Pressure (Systolic BP above 180 or Diastolic BP over 105)., Disp: 4 tablet, Rfl: 0    dicyclomine (BENTYL) 10 MG capsule, Take 1 capsule by mouth 3 (Three) Times a Day As Needed for Abdominal Cramping., Disp: 30 capsule, Rfl: 0    dilTIAZem XR (DILACOR XR) 120 MG 24 hr capsule, Take 1 capsule by mouth Daily., Disp: 90 capsule, Rfl: 3    lisinopril (PRINIVIL,ZESTRIL) 10 MG tablet, Take 1 tablet by mouth Every 12 (Twelve) Hours., Disp: 180 tablet, Rfl: 3    ALLERGIES     Patient has no known allergies.    FAMILY HISTORY       Family History   Problem Relation Age of Onset    Heart failure Mother         CHF    Osteoarthritis Mother     Heart attack Father         Had a stroke 1993    Stroke Father         Stroke syndrome    ALS Brother     Breast cancer Paternal Aunt         MID 70'S    Ovarian cancer Neg Hx           SOCIAL HISTORY       Social History     Socioeconomic History    Marital status:    Tobacco Use    Smoking status: Never     Passive exposure: Never    Smokeless tobacco: Never   Vaping Use    Vaping status: Never Used   Substance and Sexual Activity    Alcohol use: Never    Drug use: Never    Sexual activity: Not Currently     Partners: Male     Comment: Pill for 9 years         PHYSICAL EXAM    (up to 7 for level 4, 8 or more for level 5)     Vitals:    07/26/24 1201 07/26/24 1400 07/26/24  1401 07/26/24 1430   BP: 145/77 131/65  144/83   BP Location:       Patient Position:       Pulse: 86  60 67   Resp:       Temp:       TempSrc:       SpO2: 97%  97% 98%   Weight:       Height:           General: Awake, alert, no acute distress.  HEENT: Conjunctivae normal.  Neck: Trachea midline.  There is no nuchal rigidity.  Cardiac: Heart regular rate, rhythm, no murmurs, rubs, or gallops  Lungs: Lungs are clear to auscultation, there is no wheezing, rhonchi, or rales. There is no use of accessory muscles.  Chest wall: There is no tenderness to palpation over the chest wall or over ribs  Abdomen: Abdomen is soft, nontender, nondistended. There are no firm or pulsatile masses, no rebound rigidity or guarding.   Musculoskeletal: No deformity.  Neuro: Alert and oriented x4.  Cranial nerves II through XII are grossly intact.  There are no visual field deficits.  Cerebellar function intact with finger-to-nose and heel-to-shin testing.  Patient observed ambulating by myself and there is no evidence of ataxia or other gait abnormality.  Motor strength is intact in the face and strength is 5 out of 5 in all 4 extremities without any asymmetry.  Sensation to light touch is intact in all 4 extremities without any asymmetry.  Dermatology: Skin is warm and dry  Psych: Mentation is grossly normal, cognition is grossly normal. Affect is appropriate.        DIAGNOSTIC RESULTS     EKG: All EKGs are interpreted by the Emergency Department Physician who either signs or Co-signs this chart in the absence of a cardiologist.    ECG 12 Lead Other; weakness   Preliminary Result   Test Reason : Other~   Blood Pressure :   */*   mmHG   Vent. Rate :  64 BPM     Atrial Rate :  64 BPM      P-R Int : 188 ms          QRS Dur :  88 ms       QT Int : 410 ms       P-R-T Axes :  49  46  25 degrees      QTc Int : 422 ms      Normal sinus rhythm   Possible Left atrial enlargement   Borderline ECG   When compared with ECG of 04-OCT-2023 19:02,   No  significant change was found      Referred By: EDMD           Confirmed By:             RADIOLOGY:   [x] Radiologist's Report Reviewed:  CT Head Without Contrast   Final Result   Impression:   No acute intracranial findings by CT. Mild chronic findings stable since 2023 comparison.            Electronically Signed: Arsh Singletary MD     7/26/2024 12:32 PM EDT     Workstation ID: [id number]      CT Angiogram Head   Final Result      1. A 4 mm saccular aneurysm of the left communicating internal carotid artery.   2. An additional small 1.5 mm triangular shaped outpouching of the left communicating segment has an appearance suggestive of an infundibulum.               Electronically Signed: Florence Thomas MD     7/26/2024 1:38 PM EDT     Workstation ID: [id number]      IR LUMBAR PUNCTURE DIAGNOSTIC    (Results Pending)       I ordered and independently reviewed the above noted radiographic studies.        LABS:    I have reviewed and interpreted all of the currently available lab results from this visit (if applicable):  Results for orders placed or performed during the hospital encounter of 07/26/24   COVID-19 and FLU A/B PCR, 1 HR TAT - Swab, Nasopharynx    Specimen: Nasopharynx; Swab   Result Value Ref Range    COVID19 Not Detected Not Detected - Ref. Range    Influenza A PCR Not Detected Not Detected    Influenza B PCR Not Detected Not Detected   Comprehensive Metabolic Panel    Specimen: Blood   Result Value Ref Range    Glucose 97 65 - 99 mg/dL    BUN 16 8 - 23 mg/dL    Creatinine 0.72 0.57 - 1.00 mg/dL    Sodium 136 136 - 145 mmol/L    Potassium 4.5 3.5 - 5.2 mmol/L    Chloride 99 98 - 107 mmol/L    CO2 32.0 (H) 22.0 - 29.0 mmol/L    Calcium 9.8 8.6 - 10.5 mg/dL    Total Protein 7.0 6.0 - 8.5 g/dL    Albumin 4.1 3.5 - 5.2 g/dL    ALT (SGPT) 14 1 - 33 U/L    AST (SGOT) 18 1 - 32 U/L    Alkaline Phosphatase 81 39 - 117 U/L    Total Bilirubin 0.4 0.0 - 1.2 mg/dL    Globulin 2.9 gm/dL    A/G Ratio 1.4 g/dL     BUN/Creatinine Ratio 22.2 7.0 - 25.0    Anion Gap 5.0 5.0 - 15.0 mmol/L    eGFR 84.6 >60.0 mL/min/1.73   Single High Sensitivity Troponin T    Specimen: Blood   Result Value Ref Range    HS Troponin T 11 <14 ng/L   Urinalysis With Microscopic If Indicated (No Culture) - Urine, Clean Catch    Specimen: Urine, Clean Catch   Result Value Ref Range    Color, UA Yellow Yellow, Straw    Appearance, UA Clear Clear    pH, UA 8.0 5.0 - 8.0    Specific Gravity, UA 1.006 1.001 - 1.030    Glucose, UA Negative Negative    Ketones, UA Negative Negative    Bilirubin, UA Negative Negative    Blood, UA Negative Negative    Protein, UA Negative Negative    Leuk Esterase, UA Negative Negative    Nitrite, UA Negative Negative    Urobilinogen, UA 0.2 E.U./dL 0.2 - 1.0 E.U./dL   CBC Auto Differential    Specimen: Blood   Result Value Ref Range    WBC 3.99 3.40 - 10.80 10*3/mm3    RBC 4.34 3.77 - 5.28 10*6/mm3    Hemoglobin 13.4 12.0 - 15.9 g/dL    Hematocrit 41.7 34.0 - 46.6 %    MCV 96.1 79.0 - 97.0 fL    MCH 30.9 26.6 - 33.0 pg    MCHC 32.1 31.5 - 35.7 g/dL    RDW 13.2 12.3 - 15.4 %    RDW-SD 47.2 37.0 - 54.0 fl    MPV 9.7 6.0 - 12.0 fL    Platelets 237 140 - 450 10*3/mm3    Neutrophil % 67.8 42.7 - 76.0 %    Lymphocyte % 21.6 19.6 - 45.3 %    Monocyte % 8.8 5.0 - 12.0 %    Eosinophil % 1.0 0.3 - 6.2 %    Basophil % 0.5 0.0 - 1.5 %    Immature Grans % 0.3 0.0 - 0.5 %    Neutrophils, Absolute 2.71 1.70 - 7.00 10*3/mm3    Lymphocytes, Absolute 0.86 0.70 - 3.10 10*3/mm3    Monocytes, Absolute 0.35 0.10 - 0.90 10*3/mm3    Eosinophils, Absolute 0.04 0.00 - 0.40 10*3/mm3    Basophils, Absolute 0.02 0.00 - 0.20 10*3/mm3    Immature Grans, Absolute 0.01 0.00 - 0.05 10*3/mm3    nRBC 0.0 0.0 - 0.2 /100 WBC   Protein, CSF - Cerebrospinal Fluid, Lumbar Puncture    Specimen: Lumbar Puncture; Cerebrospinal Fluid   Result Value Ref Range    Protein, Total (CSF) 41.7 15.0 - 45.0 mg/dL   Glucose, CSF - Cerebrospinal Fluid, Lumbar Puncture    Specimen:  Lumbar Puncture; Cerebrospinal Fluid   Result Value Ref Range    Glucose, CSF 60 40 - 70 mg/dL   Cell Count, CSF - Cerebrospinal Fluid, Lumbar Puncture    Specimen: Lumbar Puncture; Cerebrospinal Fluid   Result Value Ref Range    WBC, CSF 0 0 - 5 /mm3    RBC, CSF 4 0 - 5 /mm3    Color, CSF Colorless Colorless    Supernatant Color, CSF Colorless Colorless    Appearance, CSF Clear Clear    Volume, CSF 9.5 mL    Tube Number, CSF 4    ECG 12 Lead Other; weakness   Result Value Ref Range    QT Interval 410 ms    QTC Interval 422 ms   CSF Tube - Cerebrospinal Fluid, Lumbar Puncture    Specimen: Lumbar Puncture; Cerebrospinal Fluid   Result Value Ref Range    Extra Tube hold         If labs were ordered, I independently reviewed the results and considered them in treating the patient.      EMERGENCY DEPARTMENT COURSE and DIFFERENTIAL DIAGNOSIS/MDM:   Vitals:  AS OF 16:13 EDT    BP - 144/83  HR - 67  TEMP - 98 °F (36.7 °C) (Oral)  O2 SATS - 98%        Discussion below represents my analysis of pertinent findings related to patient's condition, differential diagnosis, treatment plan and final disposition.      Differential diagnosis:  The differential diagnosis associated with the patient's presentation includes: Intracranial hemorrhage, intracranial mass, ACS, dysrhythmia, anemia, dehydration, electrolyte derangement, UTI, pneumonia, COVID-19, flu      Independent interpretations (ECG/rhythm strip/X-ray/US/CT scan): I dependently interpreted the patient's CT and cardiac monitor.  There is no evidence of intracranial hemorrhage and the patient is in sinus rhythm.      Additional sources:  Discussed/obtained information from independent historians:   [] Spouse:   [] Parent:   [] Friend:   [] EMS:   [] Other:  External (non-ED) record review:   [] Inpatient record:   [] Office record:   [] Outpatient record:   [] Prior Outpatient labs:   [] Prior Outpatient radiology:   [] Primary Care record:   [] Outside ED record:   [x]  Other: I reviewed telephone encounter with PCP office.  Was referred to the emergency department.      Patient's care impacted by:   [] Diabetes   [x] Hypertension   [] Coronary Artery Disease   [] Cancer   [] Other:     Care significantly affected by Social Determinants of Health (housing and economic circumstances, unemployment)    [] Yes     [x] No   If yes, Patient's care significantly limited by  Social Determinants of Health including:    [] Inadequate housing    [] Low income    [] Alcoholism and drug addiction in family    [] Problems related to primary support group    [] Unemployment    [] Problems related to employment    [] Other Social Determinants of Health:       Consideration of admission/observation vs discharge: Consider observation admission, however patient very well-appearing and nontoxic during the course of her stay in the emergency department without any neurologic complaint or deficit on exam.      Additional orders considered but not ordered:  The following testing was considered but ultimately not selected after discussion with patient/family: I considered CNS infection and therefore LP with CSF studies, however patient has no nuchal rigidity on exam, alteration in mental status, fever, or focal neurologic deficit and so felt that CNS infection was unlikely.  She is also afebrile.    ED Course:    ED Course as of 07/26/24 1613   Fri Jul 26, 2024   1438 Discussed with radiology department who will take the patient for guided lumbar puncture to evaluate for hemorrhage/xanthochromia.  I reexamined the patient and she denise very well-appearing nontoxic.  Resting comfortably in bed and without any alteration mental status or focal neurologic deficit. [NS]   1610 I have reviewed patient's data.  CT angiogram head revealed small aneurysm.  Follow-up lumbar puncture demonstrated no evidence of infection, blood in the CSF, or xanthochromia.  Remainder of patient's laboratory evaluation is benign  without any evidence of ACS, electrolyte derangement, dehydration, UTI, or other significant abnormality.  Do not feel his presentation represents CVA.  Patient is fully awake and alert, has no focal deficits on exam, and has no neurologic complaint.  Feel that patient is stable for discharge home with symptomatic management and close outpatient follow-up. [NS]      ED Course User Index  [NS] Mohit Goldman MD             I had a discussion with the patient/family regarding diagnosis, diagnostic results, treatment plan, and medications.  The patient/family indicated understanding of these instructions.  I spent adequate time at the bedside preceding discharge necessary to personally discuss the aftercare instructions, giving patient education, providing explanations of the results of our evaluations/findings, and my decision making to assure that the patient/family understand the plan of care.  Time was allotted to answer questions at that time and throughout the ED course.  Emphasis was placed on timely follow-up after discharge.  I also discussed the potential for the development of an acute emergent condition requiring further evaluation, admission, or even surgical intervention. I discussed that we found nothing during the visit today indicating the need for further workup, admission, or the presence of an unstable medical condition.  I encouraged the patient to return to the emergency department immediately for ANY concerns, worsening, new complaints, or if symptoms persist and unable to seek follow-up in a timely fashion.  The patient/family expressed understanding and agreement with this plan.  The patient will follow-up with their PCP in 1-2 days for reevaluation.           PROCEDURES:  Procedures    CRITICAL CARE TIME        FINAL IMPRESSION      1. Acute nonintractable headache, unspecified headache type    2. Cerebral aneurysm    3. Generalized weakness          DISPOSITION/PLAN     ED Disposition        ED Disposition   Discharge    Condition   Stable    Comment   --                 Comment: Please note this report has been produced using speech recognition software.      Mohit Goldman MD  Attending Emergency Physician             Mohit Goldman MD  07/26/24 1839

## 2024-07-26 NOTE — TELEPHONE ENCOUNTER
Patient calls this morning to report a pain that began yesterday in the back of her head along with weakness and elevated blood pressure. Patient stated that she had a good nights sleep but woke up this morning with the same symptoms and a feeling of just not feeling right. Patient states that something is wrong. She reports a blood pressure this morning of 142/87 which is elevated for her. She is concerned for having a stroke with these symptoms and will go to [facility] ED for evaluation.Patient showed good understanding and was agreeable to ED visit.

## 2024-07-26 NOTE — DISCHARGE INSTRUCTIONS
Your CT scan today showed a small cerebral aneurysm.  Please follow-up with the listed neurosurgeon for further evaluation/recommendations/follow-up.

## 2024-07-26 NOTE — POST-PROCEDURE NOTE
Radiology Procedure    Pre-procedure: procedure, risks discussed with patient. Patient indicated understanding and consented to procedure.     Procedure Performed: lumbar puncture     IV Sedation and/or Anesthesia:  No    Complications: none    Preliminary Findings: pending    Specimen Removed: 8cc blood tinged clearing CSF    Estimated Blood Loss:  0ml    Post-Procedure Diagnosis: pending    Post-Procedure Plan: encourage fluids, bed rest x 2 hours    Standard Discharge Instructions Given:yes     PANCHO Kulkarni  07/26/24  15:05 EDT

## 2024-07-28 LAB
QT INTERVAL: 410 MS
QTC INTERVAL: 422 MS

## 2024-07-29 LAB
BACTERIA SPEC AEROBE CULT: NORMAL
GRAM STN SPEC: NORMAL

## 2024-07-30 ENCOUNTER — PATIENT OUTREACH (OUTPATIENT)
Dept: CASE MANAGEMENT | Facility: OTHER | Age: 79
End: 2024-07-30
Payer: MEDICARE

## 2024-07-30 NOTE — OUTREACH NOTE
Patient Outreach    AMBULATORY CASE MANAGEMENT NOTE    Names and Relationships of Patient/Support Persons: Contact: Jacques Yeboah; Relationship: Self -     Call placed to patient to follow up on recent ED visit. Introduced self and role of ACM. She states she is feeling better. She has outreached to neurology and is awaiting a return call for appointment. Discussed need to follow up with her PCP. She does understand return to ED precautions. Offered to call her back next week to check on her and make sure she has her appointment but she feels comfortable in he own follow up. Denies questions or concerns but is grateful for the call.         Norah SINGLETON  Ambulatory Case Management    7/30/2024, 14:39 EDT

## 2024-07-31 LAB — BACTERIA SPEC ANAEROBE CULT: NORMAL

## 2024-08-13 ENCOUNTER — OFFICE VISIT (OUTPATIENT)
Dept: NEUROSURGERY | Facility: CLINIC | Age: 79
End: 2024-08-13
Payer: MEDICARE

## 2024-08-13 VITALS
WEIGHT: 135 LBS | DIASTOLIC BLOOD PRESSURE: 64 MMHG | HEIGHT: 65 IN | BODY MASS INDEX: 22.49 KG/M2 | SYSTOLIC BLOOD PRESSURE: 118 MMHG | TEMPERATURE: 97.3 F

## 2024-08-13 DIAGNOSIS — I67.1 SACCULAR ANEURYSM: Primary | ICD-10-CM

## 2024-08-13 PROCEDURE — 3078F DIAST BP <80 MM HG: CPT | Performed by: NEUROLOGICAL SURGERY

## 2024-08-13 PROCEDURE — 3074F SYST BP LT 130 MM HG: CPT | Performed by: NEUROLOGICAL SURGERY

## 2024-08-13 PROCEDURE — 99204 OFFICE O/P NEW MOD 45 MIN: CPT | Performed by: NEUROLOGICAL SURGERY

## 2024-08-13 RX ORDER — CLINDAMYCIN HYDROCHLORIDE 300 MG/1
CAPSULE ORAL
COMMUNITY
Start: 2024-08-07

## 2024-08-13 NOTE — PROGRESS NOTES
NAME: SANYA MUSE   DOS: 2024  : 1945  PCP: Zulma Ghotra MD    Chief Complaint:    Chief Complaint   Patient presents with    Aneurysm        History of Present Illness:  79 y.o. female   I saw this 79-year-old in neurosurgical consultation she presented to the emergency room with a headache she has a history of hypertensive urgency in the past also had a friend with a recent stroke and experienced base of neck pain with radiation to the left side associated with some vertiginous activity and presyncopal activity and presented the ER    She underwent a thorough evaluation including a CTA of the head and a lumbar puncture that was unremarkable for any blood products she has no history of aneurysm rupture she is here for evaluation she has no family  She has had no additional symptomatology since her ER visit  PMHX  Allergies:  No Known Allergies  Medications    Current Outpatient Medications:     atorvastatin (LIPITOR) 10 MG tablet, Take 1 tablet by mouth Daily., Disp: 90 tablet, Rfl: 1    Calcium Carb-Cholecalciferol (CALCIUM 600 + D PO), Take 1 capsule by mouth 2 (Two) Times a Day., Disp: , Rfl:     clindamycin (CLEOCIN) 300 MG capsule, TAKE 2 CAPSULES 1 HOUR PRIOR TO DENTAL APPOINTMENT, Disp: , Rfl:     cloNIDine (CATAPRES) 0.1 MG tablet, Take 1 tablet by mouth 2 (Two) Times a Day As Needed for High Blood Pressure (Systolic BP above 180 or Diastolic BP over 105)., Disp: 4 tablet, Rfl: 0    dilTIAZem XR (DILACOR XR) 120 MG 24 hr capsule, Take 1 capsule by mouth Daily., Disp: 90 capsule, Rfl: 3    lisinopril (PRINIVIL,ZESTRIL) 10 MG tablet, Take 1 tablet by mouth Every 12 (Twelve) Hours., Disp: 180 tablet, Rfl: 3  Past Medical History:  Past Medical History:   Diagnosis Date    Aneurysm 23    On scan in ER    Arthritis     Arthritis of back Long history    Celiac disease     Fracture of wrist Aug 21 2022    Boxer’s fravture    H/O bone density study     Hip pain     History of  colonoscopy     CSGA    History of mammogram 08/13/2020    Hypertension     Inflammatory arthritis     Lumbago     Lumbar degenerative disc disease     Metatarsalgia, left foot     Osteoarthritis of knees, bilateral     Pelvic fracture     Primary osteoarthritis of both hips     Sciatica     Thoracic spondylosis      Past Surgical History:  Past Surgical History:   Procedure Laterality Date    COLONOSCOPY      CSGA    EYE SURGERY      FRACTURE SURGERY      left wrist; pelvis    JOINT REPLACEMENT  2017    TONSILLECTOMY      TOTAL HIP ARTHROPLASTY Right 10/03/2017    Procedure: TOTAL HIP ARTHROPLASTY RIGHT;  Surgeon: Prem Hodges MD;  Location: [facility];  Service:     TUBAL ABDOMINAL LIGATION       Social Hx:  Social History     Tobacco Use    Smoking status: Never     Passive exposure: Never    Smokeless tobacco: Never   Vaping Use    Vaping status: Never Used   Substance Use Topics    Alcohol use: Never    Drug use: Never     Family Hx:  Family History   Problem Relation Age of Onset    Heart failure Mother         CHF    Osteoarthritis Mother     Heart attack Father         Had a stroke 1993    Stroke Father         Stroke syndrome    ALS Brother     Breast cancer Paternal Aunt         MID 70'S    Ovarian cancer Neg Hx      Review of Systems:        Review of Systems   Constitutional:  Positive for fatigue. Negative for activity change, appetite change, chills, diaphoresis, fever and unexpected weight change.   HENT:  Negative for congestion, dental problem, drooling, ear discharge, ear pain, facial swelling, hearing loss, mouth sores, nosebleeds, postnasal drip, rhinorrhea, sinus pressure, sinus pain, sneezing, sore throat, tinnitus, trouble swallowing and voice change.    Eyes:  Negative for photophobia, pain, discharge, redness, itching and visual disturbance.   Respiratory:  Negative for apnea, cough, choking, chest tightness, shortness of breath, wheezing and stridor.    Cardiovascular:  Negative  for chest pain, palpitations and leg swelling.   Gastrointestinal:  Negative for abdominal distention, abdominal pain, anal bleeding, blood in stool, constipation, diarrhea, nausea, rectal pain and vomiting.   Endocrine: Negative for cold intolerance, heat intolerance, polydipsia, polyphagia and polyuria.   Genitourinary:  Negative for decreased urine volume, difficulty urinating, dyspareunia, dysuria, enuresis, flank pain, frequency, genital sores, hematuria, menstrual problem, pelvic pain, urgency, vaginal bleeding, vaginal discharge and vaginal pain.   Musculoskeletal:  Positive for neck pain. Negative for arthralgias, back pain, gait problem, joint swelling, myalgias and neck stiffness.   Skin:  Negative for color change, pallor, rash and wound.   Allergic/Immunologic: Negative for environmental allergies, food allergies and immunocompromised state.   Neurological:  Positive for dizziness and headaches. Negative for tremors, seizures, syncope, facial asymmetry, speech difficulty, weakness, light-headedness and numbness.   Hematological:  Negative for adenopathy. Does not bruise/bleed easily.   Psychiatric/Behavioral:  Negative for agitation, behavioral problems, confusion, decreased concentration, dysphoric mood, hallucinations, self-injury, sleep disturbance and suicidal ideas. The patient is not nervous/anxious and is not hyperactive.       I have reviewed this note template and all pertinent parts of the review of systems social, family history, surgical history and medication list    Physical Examination:  Vitals:    08/13/24 1305   BP: 118/64   Temp:       General Appearance:   Well developed, well nourished, well groomed, alert, and cooperative.  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  It demonstrates an approximately 4 mm left-sided posterior communicating artery aneurysm as well as a small infundibulum as mentioned by radiology there is significant tortuosity of the cavernous segment without evidence of large excrescence noted  Diagnoses/Plan:    Ms. Yeboah is a 79 y.o. female   1.  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  Cervical arthritis with presyncopal episode likely with secondary headache no evidence of cervical myelopathy     I explained the risk benefits and expected outcome of major elective surgery for their problem, complications from approach, and infection, the risk of neurologic implications after surgery as well as need for repeat surgeries and most importantly failure to achieve quality of life improvement from the surgery to the patient.  I discussed treatment options for aneurysms including endovascular coiling after extensive discussion and shared decision making and given the small size of less than 5 mm it is more than reasonable to consider a conservative approach given the patient's maturity and risk of rupture I did explain it is low but not 0    Plan will be follow-up MRA in 6 months to assess for changes in size    I explained the signs and symptoms to look for to necessitate a referral back as well as did offer further treatment should she be worried about her aneurysm but did explain that requires an invasive approach associate with  anticoagulation risk etc.

## 2024-08-14 ENCOUNTER — OFFICE VISIT (OUTPATIENT)
Dept: INTERNAL MEDICINE | Facility: CLINIC | Age: 79
End: 2024-08-14
Payer: MEDICARE

## 2024-08-14 VITALS
WEIGHT: 134.8 LBS | DIASTOLIC BLOOD PRESSURE: 64 MMHG | HEIGHT: 65 IN | OXYGEN SATURATION: 98 % | SYSTOLIC BLOOD PRESSURE: 108 MMHG | TEMPERATURE: 96.8 F | BODY MASS INDEX: 22.46 KG/M2 | HEART RATE: 64 BPM | RESPIRATION RATE: 14 BRPM

## 2024-08-14 DIAGNOSIS — R05.3 CHRONIC COUGH: ICD-10-CM

## 2024-08-14 DIAGNOSIS — R73.09 ELEVATED GLUCOSE: ICD-10-CM

## 2024-08-14 DIAGNOSIS — I10 PRIMARY HYPERTENSION: ICD-10-CM

## 2024-08-14 DIAGNOSIS — R51.9 HEADACHE, CHRONIC DAILY: ICD-10-CM

## 2024-08-14 DIAGNOSIS — H93.13 TINNITUS OF BOTH EARS: ICD-10-CM

## 2024-08-14 DIAGNOSIS — I67.1 CEREBRAL ANEURYSM: Primary | ICD-10-CM

## 2024-08-14 LAB
EXPIRATION DATE: ABNORMAL
HBA1C MFR BLD: 5.6 % (ref 4.5–5.7)
Lab: ABNORMAL

## 2024-08-14 PROCEDURE — 3074F SYST BP LT 130 MM HG: CPT | Performed by: INTERNAL MEDICINE

## 2024-08-14 PROCEDURE — 1126F AMNT PAIN NOTED NONE PRSNT: CPT | Performed by: INTERNAL MEDICINE

## 2024-08-14 PROCEDURE — 3078F DIAST BP <80 MM HG: CPT | Performed by: INTERNAL MEDICINE

## 2024-08-14 PROCEDURE — 3044F HG A1C LEVEL LT 7.0%: CPT | Performed by: INTERNAL MEDICINE

## 2024-08-14 PROCEDURE — 83036 HEMOGLOBIN GLYCOSYLATED A1C: CPT | Performed by: INTERNAL MEDICINE

## 2024-08-14 PROCEDURE — 1160F RVW MEDS BY RX/DR IN RCRD: CPT | Performed by: INTERNAL MEDICINE

## 2024-08-14 PROCEDURE — 99214 OFFICE O/P EST MOD 30 MIN: CPT | Performed by: INTERNAL MEDICINE

## 2024-08-14 PROCEDURE — 1159F MED LIST DOCD IN RCRD: CPT | Performed by: INTERNAL MEDICINE

## 2024-08-14 RX ORDER — LOSARTAN POTASSIUM 50 MG/1
50 TABLET ORAL DAILY
Qty: 90 TABLET | Refills: 1 | Status: SHIPPED | OUTPATIENT
Start: 2024-08-14

## 2024-08-14 NOTE — PROGRESS NOTES
Answers submitted by the patient for this visit:  Other (Submitted on 8/13/2024)  Please describe your symptoms.: Seen in ER 8-26-24 for dull headache and was advised to get appointment . Check up for AIC  6 mos and cough, Several times a day with hard to swallow after those episodes but started after BP meds  Have you had these symptoms before?: No  How long have you been having these symptoms?: Greater than 2 weeks  Please describe any probable cause for these symptoms. : B p meds  Primary Reason for Visit (Submitted on 8/13/2024)  What is the primary reason for your visit?: Other  Chief Complaint  Hospital Follow Up Visit (7/26/24: Cerebral Aneurysm ), Hypertension, Hyperlipidemia, and Hyperglycemia    Subjective          Jacques Yeboah presents to [facility]    History of Present Illness  The patient is being seen for:    Cerebral aneurysm-patient was in the ER with a headache and neuroimaging showed a posterior PCA aneurysm.  She also had an LP which was negative for blood.  Was referred to neurosurgery and did see Dr. Mitchell yesterday regarding her cerebral aneurysm. He informed her that the aneurysm is small and unlikely to be the cause of her headaches. She has been advised to undergo an MRA in 6 months for further evaluation.    Headaches-She experiences occasional dull headaches, typically in the morning, which she manages with Tylenol. The pain is located at the back of her head, and she finds relief from massaging the area. She does not require daily medication for this.     Hypertension-patient monitors her blood pressure regularly and have been between 120 and 133. She takes lisinopril 10 mg twice daily and diltiazem in the morning.    Cough - began approximately 3 months ago and has worsened over the past 2 to 3 weeks, occurring 4 to 5 times daily. She also reports a change in her voice and difficulty clearing her throat. The cough is particularly severe when she  "lies down, causing her to sit up and cough forcefully. She also notes the presence of phlegm in her throat.    She has ringing in her ears since she had blood pressure surgery a year ago in 10/2023. She reports no hearing loss. She uses mineral oil every 2 weeks to clean her ears.    Elevated glucose-A1c done in 3/24 was 5.9.  She is exercising regularly though did have a boot on and could not walk this summer but it the boot is off now and she is able to walk again.  She also does genoveva chi and some other exercises.         Current Outpatient Medications:     atorvastatin (LIPITOR) 10 MG tablet, Take 1 tablet by mouth Daily., Disp: 90 tablet, Rfl: 1    Calcium Carb-Cholecalciferol (CALCIUM 600 + D PO), Take 1 capsule by mouth 2 (Two) Times a Day., Disp: , Rfl:     dilTIAZem XR (DILACOR XR) 120 MG 24 hr capsule, Take 1 capsule by mouth Daily., Disp: 90 capsule, Rfl: 3    clindamycin (CLEOCIN) 300 MG capsule, TAKE 2 CAPSULES 1 HOUR PRIOR TO DENTAL APPOINTMENT (Patient not taking: Reported on 8/14/2024), Disp: , Rfl:     cloNIDine (CATAPRES) 0.1 MG tablet, Take 1 tablet by mouth 2 (Two) Times a Day As Needed for High Blood Pressure (Systolic BP above 180 or Diastolic BP over 105). (Patient not taking: Reported on 8/14/2024), Disp: 4 tablet, Rfl: 0    losartan (Cozaar) 50 MG tablet, Take 1 tablet by mouth Daily., Disp: 90 tablet, Rfl: 1   The following portions of the patient's history were reviewed and updated as appropriate: allergies, current medications, past family history, past medical history, past social history, past surgical history and problem list.     Objective   Vital Signs:   /64 (BP Location: Left arm, Patient Position: Sitting, Cuff Size: Adult)   Pulse 64   Temp 96.8 °F (36 °C) (Infrared)   Resp 14   Ht 165.1 cm (65\")   Wt 61.1 kg (134 lb 12.8 oz)   SpO2 98%   BMI 22.43 kg/m²       Physical exam  Constitutional: oriented to person, place, and time.  well-developed and well-nourished. No " distress.   HENT:   Head: Normocephalic and atraumatic.   Ears: No cerumen bilaterally  Eyes: Conjunctivae and EOM are normal.   Cardiovascular: Normal rate, regular rhythm and normal heart sounds.  Exam reveals no gallop and no friction rub.    No murmur heard.  Pulmonary/Chest: Effort normal and breath sounds normal. No respiratory distress.   no wheezes.   Neurological:  alert and oriented to person, place, and time.   Neck-no C-spine tenderness, neck range of motion is normal  Skin: Skin is warm and dry. not diaphoretic.   Psychiatric:  normal mood and affect. behavior is normal. Judgment and thought content normal.      Physical Exam         Results         Result Review :                   Assessment and Plan      Assessment & Plan  1. Cerebral aneurysm.  She has follow-up with neurosurgery in 6 months for an MRI. Dr. Mitchell does not believe the aneurysm is causing her headaches.    2. Hypertension.  Lisinopril will be discontinued due to a persistent cough, a common side effect. She will switch to losartan 50 mg once a day, to be taken at night. She will monitor her blood pressure and report if it runs high or if the cough does not resolve within 2 weeks.    3. Elevated glucose.  Her A1c has improved from 5.9 to 5.6. She will continue her current healthy diet and exercise regimen.    4. Headache.  The headaches may be originating from her neck, possibly due to arthritis. She finds them manageable and will consider using a massage device or seeing a massage therapist. She will report if the headaches worsen.    5. Cough.  The cough has persisted for approximately 3 months and may be due to lisinopril. Switching to losartan is expected to alleviate the cough. If the cough does not improve within 2 weeks, further evaluation will be considered.    6. Tinnitus.  If the ringing worsens, an evaluation by an ear, nose, and throat specialist will be considered.           Follow Up   No follow-ups on file.  Patient was  given instructions and counseling regarding her condition or for health maintenance advice. Please see specific information pulled into the AVS if appropriate.     Patient or patient representative verbalized consent for the use of Ambient Listening during the visit with  Zulma Ghotra MD for chart documentation. 8/14/2024  11:10 EDT

## 2024-09-16 ENCOUNTER — OFFICE VISIT (OUTPATIENT)
Dept: INTERNAL MEDICINE | Facility: CLINIC | Age: 79
End: 2024-09-16
Payer: MEDICARE

## 2024-09-16 VITALS
BODY MASS INDEX: 22.49 KG/M2 | SYSTOLIC BLOOD PRESSURE: 132 MMHG | HEIGHT: 65 IN | RESPIRATION RATE: 14 BRPM | WEIGHT: 135 LBS | OXYGEN SATURATION: 98 % | DIASTOLIC BLOOD PRESSURE: 76 MMHG | HEART RATE: 60 BPM | TEMPERATURE: 97.1 F

## 2024-09-16 DIAGNOSIS — K13.79 MOUTH PAIN: Primary | ICD-10-CM

## 2024-09-16 PROCEDURE — 1159F MED LIST DOCD IN RCRD: CPT

## 2024-09-16 PROCEDURE — 1160F RVW MEDS BY RX/DR IN RCRD: CPT

## 2024-09-16 PROCEDURE — 3075F SYST BP GE 130 - 139MM HG: CPT

## 2024-09-16 PROCEDURE — 1126F AMNT PAIN NOTED NONE PRSNT: CPT

## 2024-09-16 PROCEDURE — G2211 COMPLEX E/M VISIT ADD ON: HCPCS

## 2024-09-16 PROCEDURE — 99212 OFFICE O/P EST SF 10 MIN: CPT

## 2024-09-16 PROCEDURE — 3078F DIAST BP <80 MM HG: CPT

## 2024-09-20 ENCOUNTER — TRANSCRIBE ORDERS (OUTPATIENT)
Dept: ADMINISTRATIVE | Facility: HOSPITAL | Age: 79
End: 2024-09-20
Payer: MEDICARE

## 2024-09-20 DIAGNOSIS — Z12.31 SCREENING MAMMOGRAM FOR BREAST CANCER: Primary | ICD-10-CM

## 2024-10-22 ENCOUNTER — OFFICE VISIT (OUTPATIENT)
Dept: ORTHOPEDIC SURGERY | Facility: CLINIC | Age: 79
End: 2024-10-22
Payer: MEDICARE

## 2024-10-22 VITALS
WEIGHT: 139 LBS | SYSTOLIC BLOOD PRESSURE: 116 MMHG | BODY MASS INDEX: 23.16 KG/M2 | DIASTOLIC BLOOD PRESSURE: 74 MMHG | HEIGHT: 65 IN

## 2024-10-22 DIAGNOSIS — M76.821 POSTERIOR TIBIAL TENDINITIS, RIGHT: ICD-10-CM

## 2024-10-22 DIAGNOSIS — M19.079 ARTHRITIS OF FIRST MTP JOINT: ICD-10-CM

## 2024-10-22 DIAGNOSIS — M79.671 RIGHT FOOT PAIN: Primary | ICD-10-CM

## 2024-10-22 NOTE — PROGRESS NOTES
[facility] Note        Subjective     CC: Pain of the Right Foot      HPI    Jacques Yeboah is a 79 y.o. female.  Established patient presents for evaluation of right foot pain.  Symptoms/pain have been ongoing for about a week.  MARIAN: Patient thought she might have twisted the foot when walking to her mailbox due to noting increased pain this past Saturday.  She began wearing her tall walking boot which she used for the left foot when she had anterior tibial tendinitis.  Since wearing the boot her pain has improved with current pain scale of 0/10.    Quality of the pain dull.  Associated symptoms pain only.  Activity related to pain walking.  Pain eased by resting.  No reported numbness or tingling.  Prior treatments began wearing tall walking boot when symptoms started which has helped.    Overall, patient's symptoms are as above.    ROS:  Constiutional:Pt denies fever, chills, nausea, or vomiting.  MSK:as above        Objective      Past Medical History  Past Medical History:   Diagnosis Date    Aneurysm 7-26-24    On scan in ER    Arthritis     Arthritis of back Long history    Celiac disease     Cerebral aneurysm 8/14/2024    Fracture of wrist Aug 21 2022    Boxer’s fravture    H/O bone density study     Hip pain     History of colonoscopy     CSGA    History of mammogram 08/13/2020    Hypertension     Inflammatory arthritis     Lumbago     Lumbar degenerative disc disease     Metatarsalgia, left foot     Osteoarthritis of knees, bilateral     Pelvic fracture     Primary osteoarthritis of both hips     Sciatica     Thoracic spondylosis      Social History     Socioeconomic History    Marital status:    Tobacco Use    Smoking status: Never     Passive exposure: Never    Smokeless tobacco: Never   Vaping Use    Vaping status: Never Used   Substance and Sexual Activity    Alcohol use: Never    Drug use: Never    Sexual activity: Not Currently     Partners: Male      "Comment: Pill for 9 years          Physical Exam  /74   Ht 165.1 cm (65\")   Wt 63 kg (139 lb)   LMP  (LMP Unknown)   BMI 23.13 kg/m²     Body mass index is 23.13 kg/m².    Patient is well nourished and well developed.        Ortho Exam  Right ankle/foot  Skin: Grossly intact without erythema, warmth, swelling.  Tenderness: Lateral malleolus negative.  Medial malleolus negative.  Positive tenderness noted along posterior tib tendon into insertion.  Motion: Dorsiflexion 5°, plantarflexion 30°.  Able to wiggle toes.  Motor: Grossly intact Q/HS/TA/GS/EHL/P.  Sensory: Grossly intact to light touch DP/SP/S/S/T nerve distributions.   Vascular: 2+ DP/PT pulses with brisk capillary refill into each digit      Imaging/Labs/EMG Reviewed and Interpreted:  Ordered right ankle and foot plain films.  Imaging read/interpreted by Dr. Holley..    Imaging Results (Last 24 Hours)       Procedure Component Value Units Date/Time    XR Ankle 3+ View Right [[id number]] Resulted: 10/22/24 1542     Updated: 10/22/24 1542    Narrative:      Indication: Right ankle pain    Comparison: No prior xrays are available for comparison      Impression:           Right ankle 3 views: Radiographs demonstrate mild arthritic changes of the   right ankle joint with no acute bony injury or fracture.  Calcification in   the soft tissues anterior to the tibial metaphysis is identified.  Ankle   mortise is symmetric.      XR Foot 3+ View Right [[id number]] Resulted: 10/22/24 1542     Updated: 10/22/24 1542    Narrative:      Indication: Right foot pain    Comparison: No prior xrays are available for comparison      Impression:           Right foot 3 views: Radiographs demonstrate severe first TMT joint   arthritis with erosive changes and large para-articular osteophyte   formation.  Mild to moderate midfoot arthritis.  No acute bony injury or   fracture.              Assessment:  1. Right foot pain    2. Posterior tibial tendinitis, right    3. " Arthritis of first MTP joint        Plan:  Right foot pain due to posterior tibial tendinitis.  Arthritis/hallux rigidus first MTP--currently patient is asymptomatic.  Continue with observation.  Reviewed imaging.  Since boot has made it feel better already, patient will continue with boot for soft tissue rest.  Did discuss treatment for posterior tib tendinitis sometimes requires casting for period of 6 weeks.  Patient does not want to be casted at this time.  Recommend OTC NSAIDS/pain medication as needed.  Follow up in 6 weeks for repeat evaluation.  If in 3 to 4 weeks she continues to be pain-free can return to wearing regular shoes.  Questions and concerns answered.      Answers submitted by the patient for this visit:  Other (Submitted on 10/21/2024)  Please describe your symptoms.: Right foot pain where tendon runs on top/side of foot  Have you had these symptoms before?: No  How long have you been having these symptoms?: 5-7 days  Please describe any probable cause for these symptoms. : Stepped wrong way ????  Primary Reason for Visit (Submitted on 10/21/2024)  What is the primary reason for your visit?: Problem Not Listed      Francine Vicente PA-C  10/23/24  10:18 EDT      Dictated Utilizing Dragon Dictation.

## 2024-10-23 ENCOUNTER — HOSPITAL ENCOUNTER (OUTPATIENT)
Dept: MAMMOGRAPHY | Facility: HOSPITAL | Age: 79
Discharge: HOME | End: 2024-10-23
Admitting: INTERNAL MEDICINE
Payer: MEDICARE

## 2024-10-23 DIAGNOSIS — Z12.31 SCREENING MAMMOGRAM FOR BREAST CANCER: ICD-10-CM

## 2024-10-23 PROCEDURE — 77067 SCR MAMMO BI INCL CAD: CPT

## 2024-10-23 PROCEDURE — 77063 BREAST TOMOSYNTHESIS BI: CPT

## 2024-10-25 ENCOUNTER — TELEPHONE (OUTPATIENT)
Dept: ORTHOPEDIC SURGERY | Facility: CLINIC | Age: 79
End: 2024-10-25
Payer: MEDICARE

## 2024-10-28 ENCOUNTER — TELEPHONE (OUTPATIENT)
Dept: ORTHOPEDIC SURGERY | Facility: CLINIC | Age: 79
End: 2024-10-28
Payer: MEDICARE

## 2024-10-28 NOTE — TELEPHONE ENCOUNTER
PATIENT IS CALLING IN REGARDS TO HER RIGHT FOOT. SHE HAS BEEN SEEING BRANT FOR TENDONITIS. SHE I HAS BEEN WEARING A BOOT AND SHE STATED THAT SHE STUMBLED EARLIER CAUSING HER ANKLE TO JERK INSIDE THE BOOT. NOW SHE IS IN A LOT OF PAIN. BRANT MENTIONED THE PATIENT NEEDED CASTING BEFORE WEARING THE BOOT, BUT THE PATIENT DIDN'T WANT TO DO THAT. NOW THAT SHE HAS FALLEN, SHE IS AGREEABLE TO TRY CASTING. DOES SHE NEED TO COME IN FOR ANOTHER APPOINTMENT OR CAN SHE COME IN AND BE CASTED.     IF BRANT OR CLINICAL STAFF COULD PLEASE ADVISE.     CALL BACK # [phone]

## 2024-10-28 NOTE — TELEPHONE ENCOUNTER
Patient called back; She reports the pain does feel like it's in the same area but she would prefer to come in first to be evaluated to make sure Francine still agrees with proceeding with casting.     I offered her an appt tomorrow at 1:50pm at the [facility] office. She was agreeable to this.    Bertrand CHOI CMA ([facility]), ROT

## 2024-10-28 NOTE — THERAPY DISCHARGE NOTE
[facility] Cardiology Progress Note    Hoda Busby Patient Status:  Inpatient    1941 MRN [id number]   Location [facility]5W Attending Malathi Stuart MD   Hosp Day # 4 PCP Malathi Stuart MD     Subjective:  I feel good  Sitting in chair O2 @ 6 L nc   Becomes dyspneic with exertion  Denies chest pain      Objective:  /76 (BP Location: Right arm)   Pulse 88   Temp 98 °F (36.7 °C) (Oral)   Resp 20   Wt 178 lb (80.7 kg)   SpO2 91%   BMI 27.06 kg/m²     Telemetry: SR HR 80's    Intake/Output:    Intake/Output Summary (Last 24 hours) at 10/28/2024 0840  Last data filed at 10/28/2024 0744  Gross per 24 hour   Intake 1404.93 ml   Output 1050 ml   Net 354.93 ml       Last 3 Weights   10/24/24 1651 178 lb (80.7 kg)   10/24/24 1504 178 lb (80.7 kg)   24 1336 183 lb 3.2 oz (83.1 kg)       Labs:  Recent Labs   Lab 10/26/24  0545 10/27/24  0535 10/28/24  0452   * 142* 134*   BUN 16 24* 26*   CREATSERUM 0.88 0.95 1.01   EGFRCR 65 59* 55*   CA 8.4* 8.7 8.9    139 140   K 3.6 3.5 3.7    108 106   CO2 23.0 24.0 24.0     Recent Labs   Lab 10/26/24  0545 10/27/24  0535 10/28/24  0452   RBC 3.31* 3.40* 3.61*   HGB 10.4* 10.2* 11.0*   HCT 29.5* 29.7* 32.9*   MCV 89.1 87.4 91.1   MCH 31.4 30.0 30.5   MCHC 35.3 34.3 33.4   RDW 12.7 12.9 12.7   NEPRELIM 9.23* 17.25* 17.95*   WBC 10.3 19.3* 20.0*   .0  398.0 532.0*  532.0* 578.0*         No results for input(s): \"TROP\", \"TROPHS\", \"CK\" in the last 168 hours.    Diagnostics:   CxR    10/25/24  CT chest PE aorta  CONCLUSION:      1. Acute distal segmental/subsegmental pulmonary embolism involving the right upper lobe as well as a subsegmental pulmonary embolism in the left upper lobe.   2. Multifocal ground-glass opacities involving the right greater than left lungs with some areas of perilobular sparing as well as small consolidations involving the bilateral lower lobes.  Differential diagnosis includes multifocal  Acute Care - Occupational Therapy Initial Eval/Discharge  [facility]     Patient Name: Jacques Yeboah  : 1945  MRN: [id number]  Today's Date: 10/4/2017  Onset of Illness/Injury or Date of Surgery Date: 10/03/17  Date of Referral to OT: 10/03/17  Referring Physician: MD Carroll      Admit Date: 10/3/2017       ICD-10-CM ICD-9-CM   1. Impaired functional mobility, balance, gait, and endurance Z74.09 V49.89   2. Impaired mobility and ADLs Z74.09 799.89   3. Status post total replacement of right hip Z96.641 V43.64     Patient Active Problem List   Diagnosis   • Hyperlipidemia   • Arthritis of right hip   • Status post total replacement of right hip     Past Medical History:   Diagnosis Date   • Arthritis    • H/O bone density study    • Hip pain    • History of colonoscopy    • History of mammogram    • Impacted cerumen of right ear     Ears needs rinsed out, feels clogged two days.    • Inflammatory arthritis    • Lumbago    • Lumbar degenerative disc disease    • Metatarsalgia, left foot    • Osteoarthritis of knees, bilateral    • Pelvic fracture    • Primary osteoarthritis of both hips    • Sciatica    • Thoracic spondylosis    • Wears glasses      Past Surgical History:   Procedure Laterality Date   • COLONOSCOPY     • EYE SURGERY     • FRACTURE SURGERY      left wrist; pelvis   • TONSILLECTOMY     • TUBAL ABDOMINAL LIGATION            OT ASSESSMENT FLOWSHEET (last 72 hours)      OT Evaluation       10/04/17 0914 10/04/17 0859 10/03/17 2010 10/03/17 1800 10/03/17 1633    Rehab Evaluation    Document Type (P)  therapy note (daily note);discharge summary  -LR evaluation;discharge evaluation/summary  -MC       Subjective Information (P)  agree to therapy;complains of;pain  -LR no complaints;agree to therapy  -MC       Patient Effort, Rehab Treatment (P)  good  -LR excellent  -MC       Symptoms Noted During/After Treatment (P)  none  -LR fatigue  -MC       General Information    Patient Profile Review  yes   -       Onset of Illness/Injury or Date of Surgery Date  10/03/17  -       Referring Physician  MD Carroll  -       General Observations  Pt received UIC, IV heplocked,  and friend present  -       Pertinent History Of Current Problem  Pt is 72 YOF who presented with surgical management of progressive and worsening R hip pain that failed to improve with conservative tx  [facility] [place]       Precautions/Limitations (P)  fall precautions;hip precautions- right  -LR hip precautions- right;fall precautions  -       Prior Level of Function  min assist:;all household mobility;community mobility;gait;transfer;bed mobility;bathing;dressing;independent:;feeding;grooming  -       Equipment Currently Used at Home  walker, rolling;grab bar;shower chair;commode;raised toilet  -       Plans/Goals Discussed With  patient and family;agreed upon  -       Risks Reviewed  patient and family:;LOB;nausea/vomiting;dizziness;increased discomfort;increased drainage  -       Benefits Reviewed  patient and family:;improve function;increase independence;increase strength;increase balance;decrease pain;increase knowledge  -       Barriers to Rehab  none identified  -       Living Environment    Lives With  spouse  -       Living Arrangements  house  -       Home Accessibility  stairs to enter home   walk in shower  -       Number of Stairs to Enter Home  2  -       Stair Railings at Home  none  -       Clinical Impression    Date of Referral to OT  10/03/17  -       OT Diagnosis  Impaired ADLs and functional mobility  -       Patient/Family Goals Statement  to return home   -       Criteria for Skilled Therapeutic Interventions Met  yes  -       Rehab Potential  good, to achieve stated therapy goals  -       Therapy Frequency  evaluation only   pt to DC home with assist this date  -       Anticipated Discharge Disposition  home with assist  -       Functional Level Prior    Ambulation      pneumonia, organizing    pneumonia, or less likely other etiologies.   3. Small right and trace left pleural effusions.   4. Mild centrilobular emphysema.   5. Mild mediastinal and right hilar lymphadenopathy, which is favored to be reactive.   6. Biatrial predominant cardiomegaly with triple-vessel coronary artery calcifications, mitral annular calcifications, and aortic valve leaflet calcifications.   7. Lesser incidental findings described above.         The findings of acute pulmonary embolism as well as impression points 2 and 3 were communicated via secure epic chat to Dr. Holloway  at 2:24 p.m. on 10/25/2024 by Basim West MD     10/28/24 CxR  Impression   CONCLUSION: Cardiac enlargement with secondary signs of moderate interstitial edema unchanged since previous exam.               Physical Exam:    Physical Exam  Constitutional:       Appearance: Normal appearance. She is normal weight.   HENT:      Head: Normocephalic.      Mouth/Throat:      Mouth: Mucous membranes are moist.      Pharynx: Oropharynx is clear.   Eyes:      Conjunctiva/sclera: Conjunctivae normal.   Cardiovascular:      Rate and Rhythm: Normal rate and regular rhythm.      Comments: SR - rates 80's  S1 S2 WADE 4/6 rsb WADE 3/6 lsb  Trace - 1+ edema bilat LE   Pulmonary:      Comments: Dyspnea with exertion  O2 6 l nc  Crackles coarse at bases   Abdominal:      General: Bowel sounds are normal.      Palpations: Abdomen is soft.   Musculoskeletal:      Cervical back: Neck supple.      Comments: Trace to 1+ bilat edema noted  Pp 1+ bilat   Skin:     General: Skin is warm and dry.   Neurological:      General: No focal deficit present.      Mental Status: She is alert and oriented to person, place, and time.   Psychiatric:         Mood and Affect: Mood normal.         Behavior: Behavior normal.         Medications:   atorvastatin  20 mg Oral Nightly    azithromycin  500 mg Oral Daily    methylPREDNISolone  40 mg Intravenous Q6H     0-->independent  -CB    Transferring     0-->independent  -CB    Toileting     0-->independent  -CB    Bathing     0-->independent  -CB    Dressing     0-->independent  -CB    Eating     0-->independent  -CB    Communication     0-->understands/communicates without difficulty  -CB    Swallowing     0-->swallows foods/liquids without difficulty  -CB    Pain Assessment    Pain Assessment (P)  0-10  -LR 0-10  -MC       Nunez-Jackson FACES Pain Rating  2  -       Pain Score  2  -       Post Pain Score  2  -       Pain Type  Acute pain;Surgical pain  -       Pain Location  Hip  -       Pain Orientation  Right  -       Vision Assessment/Intervention    Visual Impairment  WFL with corrective lenses  -       Cognitive Assessment/Intervention    Current Cognitive/Communication Assessment  functional  -       Orientation Status  oriented x 4  -       Follows Commands/Answers Questions  100% of the time  -       Personal Safety  WNL/WFL  -       Personal Safety Interventions  fall prevention program maintained;gait belt;muscle strengthening facilitated;nonskid shoes/slippers when out of bed;supervised activity  -       Short/Long Term Memory  intact short term memory;intact long term memory  -       ROM (Range of Motion)    General ROM  no range of motion deficits identified  -       General ROM Detail  for BUE. Defer BLE to PT  -       MMT (Manual Muscle Testing)    General MMT Assessment  no strength deficits identified  -       General MMT Assessment Detail  for BUE . Defer BLE to PT  -       Mobility Assessment/Training    Extremity Weight-Bearing Status  right lower extremity  -       Right Lower Extremity Weight-Bearing  weight-bearing as tolerated  -       Bed Mobility, Assessment/Treatment    Bed Mobility, Comment  NT - UIC  -       Transfer Assessment/Treatment    Transfers, Sit-Stand [place]  contact guard assist  -       Transfers, Stand-Sit Lamar  contact guard  piperacillin-tazobactam  3.375 g Intravenous Q8H    amLODIPine  5 mg Oral Daily    aspirin  81 mg Oral Daily      continuous dose heparin 1,100 Units/hr (10/27/24 1158)       Assessment:    Acute bilateral PE   - Cont hep gtt for PE   - OK to transition to DOAC pending pulmonary     - possible bronchoscopy discussed   - No DVT per venous doppler    Multifocal pneumonia - pw worsening cough and dyspnea   -O2 sats 91-94% on 6L NC    -afebrile    - WBC 20 on steroids and IV anbx     - ID and pulm following     Severe aortic stenosis   - Echo frmom 10/25/24 w/EF 55-60% NWMA RV cavity increased with nrmal systolic function mod PHTN   - Plan for OP structural heart eval for possible TAVR   - Patient has been aware of Aortic valve vu and agreeable for w/u    HTN   - controlled 120-130's sbp   - continue amlodipine      HLD   - Statin started this admit   -      Chronic anemia   - H&H stable 11/32    Plan:     - continue amlodipine   - breathing improved with IV lasix x2    - give lasix IV 10 mg with 20 meq K today for diuresis   - continue IV heparin per protocol - per pulmonary considering bronch   - Anbx and steroids per pulmonary and ID   - Electrolyte replacement per protocol   - Strict I/O   - plan for TAVR evaluation as OP for severe aortic stenosis    -         Sima Lackey NP  10/28/2024  8:40 AM  Ph [phone] ([place])  Ph [phone] ([place])     assist  -       Transfers, Sit-Stand-Sit, Assist Device  rolling walker  -       Transfer, Safety Issues  sequencing ability decreased;step length decreased;weight-shifting ability decreased  -       Transfer, Impairments  ROM decreased;strength decreased;pain  -       Transfer, Comment  Pt performed sit  <  > stand transfers x 2 reps.  Cues for hand placement, safe transfer technique and to extend RLE out prior to sitting as well as to adhere to hip precautions  -       Functional Mobility    Functional Mobility- Ind. Level  contact guard assist  -       Functional Mobility- Device  rolling walker  -       Functional Mobility-Distance (Feet)  --   in hallway  -       Functional Mobility- Comment  Cues not to twist or pivot during turns  -       Upper Body Dressing Assessment/Training    UB Dressing Assess/Train, Clothing Type  donning:;doffing:;bra;hospital gown;pull over  -       UB Dressing Assess/Train, Position  supported sitting  -       UB Dressing Assess/Train, [place]  set up required  -       Lower Body Dressing Assessment/Training    LB Dressing Assess/Train, Clothing Type  donning:;doffing:;slipper socks;pants  -       LB Dressing Assess/Train, Assist Device  reacher;sock-aid  -       LB Dressing Assess/Train, Position  supported sitting  -       LB Dressing Assess/Train, [place]  supervision required;verbal cues required  -       LB Dressing Assess/Train, Impairments  ROM decreased;strength decreased  -       LB Dressing Assess/Train, Comment  Cues for sequencing/use of AE and to adhere to hip precautions  -       Grooming Assessment/Training    Grooming Assess/Train, Position  supported sitting  -       Grooming Assess/Train, Indepen Level  set up required  -       Motor Skills/Interventions    Additional Documentation  Balance Skills Training (Group);Fine Motor Coordination Training (Group);Gross Motor Coordination Training (Group)  -       Balance  Skills Training    Sitting-Level of Assistance  Distant supervision  -       Sitting-Balance Support  Feet supported;Right upper extremity supported;Left upper extremity supported  -       Standing-Level of Assistance  Contact guard  -       Static Standing Balance Support  assistive device  -       Gait Balance-Level of Assistance  Contact guard  -       Gait Balance Support  assistive device  -       Gross Motor Coordination Training    Gross Motor Skill, Impairments Detail  BUE WFL  -       Fine Motor Coordination Training    Opposition  Right:;Left:;intact  -       Sensory Assessment/Intervention    Light Touch  LUE;RUE;RLE  -MC LLE;RLE  -TC      LUE Light Touch  WNL  -       RUE Light Touch  WNL  -       LLE Light Touch  WNL  - WNL  -TC      RLE Light Touch   WNL   pt denies N/T  -TC mild impairment  -CB     Positioning and Restraints    Pre-Treatment Position  sitting in chair/recliner  -       Post Treatment Position  chair  -       In Chair  notified nsg;sitting;with PT  -         10/03/17 1600 10/03/17 1400 10/03/17 1334 10/03/17 1200 10/03/17 1115    Rehab Evaluation    Document Type   evaluation  -      Subjective Information   agree to therapy;no complaints  -      Patient Effort, Rehab Treatment   excellent  -      Symptoms Noted During/After Treatment   increased pain  -      Symptoms Noted Comment   RN notified  -      General Information    Patient Profile Review   yes  -      Onset of Illness/Injury or Date of Surgery Date   10/03/17  -      Referring Physician   MD Carroll  -      General Observations   Pt supine in bed, IV, SCDs, ice to R hip  -      Pertinent History Of Current Problem   Pt presents for surgical management of R hip pain and dysfunction that failed to improve with conservative management  -      Precautions/Limitations   fall precautions;hip precautions- right  -      Prior Level of Function   min assist:;all household  mobility;community mobility;gait;transfer;bed mobility   Pain increased mobility  -MJ      Equipment Currently Used at Home   walker, rolling;grab bar;shower chair;commode;raised toilet  -MJ      Plans/Goals Discussed With   patient;agreed upon  -MJ      Risks Reviewed   patient:;LOB;increased discomfort  -MJ      Benefits Reviewed   patient:;improve function;increase independence;increase strength;increase balance;decrease pain  -MJ      Barriers to Rehab   none identified  -MJ      Living Environment    Lives With   spouse  -MJ      Living Arrangements   house  -MJ      Home Accessibility   stairs to enter home   walk-in shower  -MJ      Number of Stairs to Enter Home   1  -MJ      Pain Assessment    Pain Assessment   0-10  -MJ      Pain Score   0  -MJ      Post Pain Score   2  -MJ      Pain Type   Acute pain  -MJ      Pain Location   Hip  -MJ      Pain Orientation   Right  -MJ      Pain Intervention(s)   Repositioned;Ambulation/increased activity  -MJ      Cognitive Assessment/Intervention    Current Cognitive/Communication Assessment   functional  -MJ      Orientation Status   oriented x 4  -MJ      Follows Commands/Answers Questions   100% of the time;able to follow multi-step instructions;needs cueing  -MJ      Personal Safety   WNL/WFL  -MJ      ROM (Range of Motion)    General ROM   lower extremity range of motion deficits identified  -MJ      MMT (Manual Muscle Testing)    General MMT Assessment   lower extremity strength deficits identified  -MJ      Mobility Assessment/Training    Extremity Weight-Bearing Status   right lower extremity  -MJ      Right Lower Extremity Weight-Bearing   weight-bearing as tolerated  -MJ      Bed Mobility, Assessment/Treatment    Bed Mobility, Assistive Device   bed rails;head of bed elevated  -MJ      Bed Mob, Supine to Sit, [place]   contact guard assist;verbal cues required  -MJ      Bed Mob, Sit to Supine, [place]   not tested   Pt UIC  -MJ      Bed Mobility,  Safety Issues   decreased use of legs for bridging/pushing  -MJ      Bed Mobility, Impairments   ROM decreased;strength decreased;pain  -MJ      Bed Mobility, Comment   Verbal cues to move LEs off EOB and to use UEs to push trunk to sitting. Increased time and effort to perform  -MJ      Transfer Assessment/Treatment    Transfers, Sit-Stand [place]   contact guard assist;2 person assist required;verbal cues required  -MJ      Transfers, Stand-Sit [place]   contact guard assist;2 person assist required;verbal cues required  -MJ      Transfers, Sit-Stand-Sit, Assist Device   rolling walker  -MJ      Toilet Transfer, [place]   contact guard assist;2 person assist required;verbal cues required  -MJ      Toilet Transfer, Assistive Device   bedside commode without drop arms;rolling walker  -MJ      Transfer, Safety Issues   sequencing ability decreased;step length decreased;weight-shifting ability decreased  -MJ      Transfer, Impairments   ROM decreased;strength decreased;pain  -MJ      Transfer, Comment   Verbal cues to push up from bed with UEs to stand and to reach back for chair to lower into sitting. Cues to step R LE out prior to t/f. Assisted pt to BSC, pt independent for hygiene after toileting  -MJ      Therapy Exercises    Exercise Protocols   total hip  -MJ      Total Hip Exercises   right:;10 reps;ankle pumps/circles;quad set;glut set  -MJ      Sensory Assessment/Intervention    Light Touch   --   denies numbness/tingling; can actively DF both ankles  -MJ  LLE;RLE  -CB    LLE Light Touch  mild impairment  -CB  mild impairment  -CB mild impairment  -CB    RLE Light Touch mild impairment  -CB mild impairment  -CB  mild impairment  -CB moderate impairment  -CB    Positioning and Restraints    Pre-Treatment Position   in bed  -MJ      Post Treatment Position   chair  -MJ      In Chair   notified nsg;reclined;call light within reach;encouraged to call for assist;with family/caregiver  -MJ       General LE Assessment    ROM Detail   R hip AROM impaired 25%  -MJ      Lower Extremity    Lower Ext Manual Muscle Testing Detail   R LE 4-/5; L LE 4+/5  -MJ        10/03/17 0625                General Information    Equipment Currently Used at Home grab bar  -NM        Living Environment    Lives With spouse  -NM        Living Arrangements house  -NM        Transportation Available family or friend will provide  -NM        Functional Level Prior    Ambulation 0-->independent  -NM        Transferring 0-->independent  -NM        Toileting 0-->independent  -NM        Bathing 0-->independent  -NM        Dressing 0-->independent  -NM        Eating 0-->independent  -NM        Communication 0-->understands/communicates without difficulty  -NM        Swallowing 0-->swallows foods/liquids without difficulty  -NM          User Key  (r) = Recorded By, (t) = Taken By, (c) = Cosigned By    Initials Name Effective Dates    LR Solange Anjelica Box, PT 06/19/15 -     NM Nicole Melvin, RN 06/16/16 -     CAROL Calabrese, OT 03/14/16 -     MJ Daron Sauer, PT 03/14/16 -     LEO Butler, RN 10/04/16 -     TC Stalin Thomas, RN 10/17/16 -           Occupational Therapy Education     Title: PT OT SLP Therapies (Active)     Topic: Occupational Therapy (Active)     Point: ADL training (Done)    Description: Instruct learner(s) on proper safety adaptation and remediation techniques during self care or transfers.   Instruct in proper use of assistive devices.    Learning Progress Summary    Learner Readiness Method Response Comment Documented by Status   Patient NAVEEN Hill D,GABY MIRAMONTES MC 10/04/17 0942 Done   Family NAVEEN Hill D,GABY MIRAMONTES MC 10/04/17 0942 Done               Point: Precautions (Done)    Description: Instruct learner(s) on prescribed precautions during self-care and functional transfers.    Learning Progress Summary    Learner Readiness Method Response Comment Documented by Status   Patient NAVEEN Hill D,GABY MIRAMONTES    10/04/17 0942 Done   Family Felicia E,TB,D,H GABY SHRESTHA   10/04/17 0942 Done               Point: Body mechanics (Done)    Description: Instruct learner(s) on proper positioning and spine alignment during self-care, functional mobility activities and/or exercises.    Learning Progress Summary    Learner Readiness Method Response Comment Documented by Status   Patient Felicia E,TB,D,H GABY SHRESTHA   10/04/17 0942 Done   [facility] ROM[facility] 10/04/17 0942 Done                      User Key     Initials Effective Dates Name Provider Type Discipline     03/14/16 -  Talisha Calabrese, OT Occupational Therapist OT                OT Recommendation and Plan  Anticipated Discharge Disposition: home with assist  Therapy Frequency: evaluation only (pt to DC home with assist this date)  Plan of Care Review  Plan Of Care Reviewed With: patient, spouse  Progress: improving  Outcome Summary/Follow up Plan: OT initial eval completed. Pt pending DC home this date with assist. Pt demonstrates/verbalizes good understanding of hip precautions when performing ADLs.  All goals met, IN OT            OT Goals       10/04/17 0942          Transfer Training OT LTG    Transfer Training OT LTG, Date Established 10/04/17  -      Transfer Training OT LTG, Time to Achieve by discharge  -      Transfer Training OT LTG, Activity Type sit to stand/stand to sit  -      Transfer Training OT LTG, [place] Level contact guard assist  -      Transfer Training OT LTG, Assist Device walker, rolling  -      Transfer Training OT LTG, Outcome goal met  -      Patient Education OT LTG    Patient Education OT LTG, Date Established 10/04/17  -      Patient Education OT LTG, Time to Achieve by discharge  -      Patient Education OT LTG, Education Type precautions per surgeon;1 hand/tomás technique;home safety;adaptive equipment mgmt  -      Patient Education OT LTG, Education Understanding demonstrates adequately;verbalizes understanding  -       Patient Education OT LTG Outcome goal met  -      LB Dressing OT LTG    LB Dressing Goal OT LTG, Date Established 10/04/17  -      LB Dressing Goal OT LTG, Time to Achieve by discharge  -      LB Dressing Goal OT LTG, [place] Level supervision required  -      LB Dressing Goal OT LTG, Adaptive Equipment reacher;sock-aid  -      LB Dressing Goal OT LTG, Outcome goal met  -        User Key  (r) = Recorded By, (t) = Taken By, (c) = Cosigned By    Initials Name Provider Type    CAROL Calabrese OT Occupational Therapist                Outcome Measures       10/04/17 0859 10/03/17 1334       How much help from another person do you currently need...    Turning from your back to your side while in flat bed without using bedrails?  3  -MJ     Moving from lying on back to sitting on the side of a flat bed without bedrails?  3  -MJ     Moving to and from a bed to a chair (including a wheelchair)?  3  -MJ     Standing up from a chair using your arms (e.g., wheelchair, bedside chair)?  3  -MJ     Climbing 3-5 steps with a railing?  3  -MJ     To walk in hospital room?  3  -MJ     AM-PAC 6 Clicks Score  18  -MJ     How much help from another is currently needed...    Putting on and taking off regular lower body clothing? 3  -MC      Bathing (including washing, rinsing, and drying) 3  -MC      Toileting (which includes using toilet bed pan or urinal) 3  -MC      Putting on and taking off regular upper body clothing 4  -MC      Taking care of personal grooming (such as brushing teeth) 4  -MC      Eating meals 4  -MC      Score 21  -MC      Functional Assessment    Outcome Measure Options AM-PAC 6 Clicks Daily Activity (OT)  - AM-PAC 6 Clicks Basic Mobility (PT)  -       User Key  (r) = Recorded By, (t) = Taken By, (c) = Cosigned By    Initials Name Provider Type    CAROL Calabrese OT Occupational Therapist    SITA Sauer, PT Physical Therapist          Time Calculation:         Time Calculation- OT        10/04/17 0944          Time Calculation- OT    OT Start Time 0859  -      Total Timed Code Minutes- OT 10 minute(s)  -      OT Received On 10/04/17  -      OT Goal Re-Cert Due Date 10/14/17  -        User Key  (r) = Recorded By, (t) = Taken By, (c) = Cosigned By    Initials Name Provider Type     Talisha Calbarese OT Occupational Therapist          Therapy Charges for Today     Code Description Service Date Service Provider Modifiers Qty    [id number]  OT EVAL LOW COMPLEXITY 4 10/4/2017 Talisha Calabrese OT GO 1    [id number]  OT THERAPEUTIC ACT EA 15 MIN 10/4/2017 Talisha Calabrese OT GO 1               OT Discharge Summary  Anticipated Discharge Disposition: home with assist    Talisha Calabrese OT  10/4/2017

## 2024-10-28 NOTE — TELEPHONE ENCOUNTER
LVM with pt requesting she give me a call back to discuss Francine's message.    Bertrand CHOI CMA ([facility]), ROT

## 2024-10-29 ENCOUNTER — OFFICE VISIT (OUTPATIENT)
Dept: ORTHOPEDIC SURGERY | Facility: CLINIC | Age: 79
End: 2024-10-29
Payer: MEDICARE

## 2024-10-29 VITALS
HEIGHT: 65 IN | WEIGHT: 138.89 LBS | SYSTOLIC BLOOD PRESSURE: 118 MMHG | DIASTOLIC BLOOD PRESSURE: 66 MMHG | BODY MASS INDEX: 23.14 KG/M2

## 2024-10-29 DIAGNOSIS — E78.00 PURE HYPERCHOLESTEROLEMIA: ICD-10-CM

## 2024-10-29 DIAGNOSIS — M79.671 RIGHT FOOT PAIN: Primary | ICD-10-CM

## 2024-10-29 DIAGNOSIS — M76.811 ANTERIOR TIBIALIS TENDINITIS, RIGHT: ICD-10-CM

## 2024-10-29 DIAGNOSIS — M25.571 ACUTE RIGHT ANKLE PAIN: ICD-10-CM

## 2024-10-29 DIAGNOSIS — M76.821 POSTERIOR TIBIAL TENDINITIS, RIGHT: ICD-10-CM

## 2024-10-29 PROCEDURE — 1159F MED LIST DOCD IN RCRD: CPT | Performed by: PHYSICIAN ASSISTANT

## 2024-10-29 PROCEDURE — 3078F DIAST BP <80 MM HG: CPT | Performed by: PHYSICIAN ASSISTANT

## 2024-10-29 PROCEDURE — 1160F RVW MEDS BY RX/DR IN RCRD: CPT | Performed by: PHYSICIAN ASSISTANT

## 2024-10-29 PROCEDURE — 3074F SYST BP LT 130 MM HG: CPT | Performed by: PHYSICIAN ASSISTANT

## 2024-10-29 PROCEDURE — 99213 OFFICE O/P EST LOW 20 MIN: CPT | Performed by: PHYSICIAN ASSISTANT

## 2024-10-29 RX ORDER — ATORVASTATIN CALCIUM 10 MG/1
10 TABLET, FILM COATED ORAL DAILY
Qty: 90 TABLET | Refills: 1 | Status: SHIPPED | OUTPATIENT
Start: 2024-10-29

## 2024-10-29 NOTE — TELEPHONE ENCOUNTER
Caller: Jacques Yeboah    Relationship: Self    Best call back number: [phone]     Requested Prescriptions:   Requested Prescriptions     Pending Prescriptions Disp Refills    atorvastatin (LIPITOR) 10 MG tablet 90 tablet 1     Sig: Take 1 tablet by mouth Daily.        Pharmacy where request should be sent: [id number] [address]  AT [facility] [address] [address] & [place] - [phone]  - [phone] FX     Last office visit with prescribing clinician: 8/14/2024   Last telemedicine visit with prescribing clinician: Visit date not found   Next office visit with prescribing clinician: 3/27/2025     Additional details provided by patient: RIGHT FOOT INJURY AND ONLY HAS RIDE TO PHARMACY TOMORROW AT  NOON WHEN GOES TO GET BOOT/CAST ON FOOT.    Does the patient have less than a 3 day supply:  [x] Yes  [] No    Would you like a call back once the refill request has been completed: [] Yes [x] No    If the office needs to give you a call back, can they leave a voicemail: [] Yes [x] No    Todd Arguelles Rep   10/29/24 08:25 EDT

## 2024-10-29 NOTE — TELEPHONE ENCOUNTER
Rx Refill Note  Requested Prescriptions     Pending Prescriptions Disp Refills    atorvastatin (LIPITOR) 10 MG tablet 90 tablet 1     Sig: Take 1 tablet by mouth Daily.      Last office visit with prescribing clinician: 8/14/2024     Next office visit with prescribing clinician: 3/27/2025       Valerie Pathak  10/29/24, 08:48 EDT

## 2024-10-29 NOTE — PROGRESS NOTES
"    [facility] Note        Subjective     CC: Follow-up (1 week recheck- Posterior tibial tendinitis, right //)      HPI    Jacques Yeboah is a 79 y.o. female.  Patient is currently being treated for right posterior tib tendinitis.  At her visit last week we discussed casting but patient wished to proceed with use of tall walking boot.  She then contacted the clinic yesterday stating that with the boot at all and she stumbled and jerked her ankle/foot causing more pain.  She is now agreeable to casting to allow for complete soft tissue rest.      Pain scale: 4/10.  Associated symptoms pain, mild swelling.  Pain is worse with walking.  Resting helps.  No reported numbness or tingling.    Overall, patient's symptoms are as above.    ROS:    Constiutional:Pt denies fever, chills, nausea, or vomiting.  MSK:as above        Objective      Past Medical History  Past Medical History:   Diagnosis Date    Aneurysm 7-26-24    On scan in ER    Arthritis     Arthritis of back Long history    Celiac disease     Cerebral aneurysm 8/14/2024    Fracture of wrist Aug 21 2022    Boxer’s fravture    H/O bone density study     Hip pain     History of colonoscopy     CSGA    History of mammogram 08/13/2020    Hypertension     Inflammatory arthritis     Lumbago     Lumbar degenerative disc disease     Metatarsalgia, left foot     Osteoarthritis of knees, bilateral     Pelvic fracture     Primary osteoarthritis of both hips     Sciatica     Thoracic spondylosis      Social History     Socioeconomic History    Marital status:    Tobacco Use    Smoking status: Never     Passive exposure: Never    Smokeless tobacco: Never   Vaping Use    Vaping status: Never Used   Substance and Sexual Activity    Alcohol use: Never    Drug use: Never    Sexual activity: Not Currently     Partners: Male     Comment: Pill for 9 years          Physical Exam  /66   Ht 165.1 cm (65\")   Wt 63 kg (138 lb 14.2 oz)   " LMP  (LMP Unknown)   BMI 23.11 kg/m²     Body mass index is 23.11 kg/m².    Patient is well nourished and well developed.        Ortho Exam  Right ankle/foot  Skin: Grossly intact without erythema, warmth.  Mild swelling.  Tenderness: Positive tenderness noted along posterior tib tendon into insertion.  Additionally she has tenderness along the anterior tibialis.  Motion: Dorsiflexion 5°, plantarflexion 30°.  Able to wiggle toes.  Motor/sensory: Remains grossly intact L2-S1.   Vascular: 2+ DP/PT pulses with brisk capillary refill into each digit.      Imaging/Labs/EMG Reviewed and Interpreted:  No new imaging today.      Assessment:  1. Right foot pain    2. Acute right ankle pain    3. Posterior tibial tendinitis, right    4. Anterior tibialis tendinitis, right        Plan:  Right foot/ankle pain due to posterior tibial tendinitis, anterior tibialis tendinitis.  Patient is agreeable to this casting.  She will return tomorrow for a fiberglass short leg walking cast.  Recommend OTC NSAIDS/pain medication as needed.  Follow up in 5 weeks.  At that point anticipate removal of cast and going back into the boot with PT x 6 weeks while in the boot followed by PT for an additional 6 weeks after discontinuing the boot.  Questions and concerns answered.      Francine Vicente PA-C  10/29/24  22:41 EDT      Dictated Utilizing Dragon Dictation.

## 2024-10-31 ENCOUNTER — TRANSCRIBE ORDERS (OUTPATIENT)
Dept: ADMINISTRATIVE | Facility: HOSPITAL | Age: 79
End: 2024-10-31
Payer: MEDICARE

## 2024-10-31 DIAGNOSIS — Z12.31 VISIT FOR SCREENING MAMMOGRAM: Primary | ICD-10-CM

## 2024-11-06 ENCOUNTER — TELEPHONE (OUTPATIENT)
Dept: ORTHOPEDIC SURGERY | Facility: CLINIC | Age: 79
End: 2024-11-06
Payer: MEDICARE

## 2024-11-06 NOTE — TELEPHONE ENCOUNTER
Caller: SANYA    Relationship to patient: SELF    Best call back number: [phone]    Type of visit: HAS APPT 12/3/24- 5 WK F/UP PER OFFICE NOTE- PATIENT THINKS IT SHOULD BE 6 WKS BEFORE F/UP- IS WILLING TO RESCHEDULE APPT IF ADDITIONAL WEEK IS NEED- PLEASE CALL

## 2024-11-07 NOTE — TELEPHONE ENCOUNTER
I called patient and explained to her and she is going to keep it as 12/3. She will call back if she wants to change it to the week after but for now we are keeping it at 5 weeks.  Leda Madden RT (R), ROT

## 2024-11-07 NOTE — TELEPHONE ENCOUNTER
Francine Vicente PA-C  You1 hour ago (7:58 AM)     CD  Yes, we had discussed 6 weeks but since she was in the boot for 1 week I switched it to 5 weeks.  If she would like to stay in that cast for the total of 6 weeks per protocol treatment that is fine.

## 2024-11-11 ENCOUNTER — TELEPHONE (OUTPATIENT)
Dept: INTERNAL MEDICINE | Facility: CLINIC | Age: 79
End: 2024-11-11
Payer: MEDICARE

## 2024-11-11 NOTE — TELEPHONE ENCOUNTER
Patient is trying to find an insurance and she wanted to make sure [facility] will take her insurance.  I let her know the only one I know [facility] does not take is [employer] medicare.

## 2024-11-11 NOTE — TELEPHONE ENCOUNTER
Caller: Jacques Yeboah    Relationship: Self    Best call back number: [phone]    What is the best time to reach you: ANYTIME    Who are you requesting to speak with (clinical staff, provider,  specific staff member):   CLINICAL STAFF    What was the call regarding: WOULD LIKE TO DISCUSS INSURANCE PLAN    Is it okay if the provider responds through MyChart: NO

## 2024-12-03 ENCOUNTER — OFFICE VISIT (OUTPATIENT)
Dept: ORTHOPEDIC SURGERY | Facility: CLINIC | Age: 79
End: 2024-12-03
Payer: MEDICARE

## 2024-12-03 VITALS
SYSTOLIC BLOOD PRESSURE: 126 MMHG | BODY MASS INDEX: 23.14 KG/M2 | DIASTOLIC BLOOD PRESSURE: 80 MMHG | WEIGHT: 138.89 LBS | HEIGHT: 65 IN

## 2024-12-03 DIAGNOSIS — M76.821 POSTERIOR TIBIAL TENDINITIS, RIGHT: Primary | ICD-10-CM

## 2024-12-03 DIAGNOSIS — M76.811 ANTERIOR TIBIALIS TENDINITIS, RIGHT: ICD-10-CM

## 2024-12-03 PROCEDURE — 3074F SYST BP LT 130 MM HG: CPT | Performed by: PHYSICIAN ASSISTANT

## 2024-12-03 PROCEDURE — 99213 OFFICE O/P EST LOW 20 MIN: CPT | Performed by: PHYSICIAN ASSISTANT

## 2024-12-03 PROCEDURE — 3079F DIAST BP 80-89 MM HG: CPT | Performed by: PHYSICIAN ASSISTANT

## 2024-12-03 NOTE — PROGRESS NOTES
"    [facility] Note        Subjective     CC: Follow-up (5 week f/u;  Posterior tibial tendinitis, right )      HPI    Jacques Yeboah is a 79 y.o. female.  Patient returns today for follow-up right posterior tib tendinitis/anterior tib tendinitis.  She has been in a cast for the last 5 weeks.  She reports improvement of pain symptoms with the casting.    Pain scale: 0/10.  Again no reported numbness or tingling into the extremity.    Overall, patient's symptoms are improved.    ROS:    Constiutional:Pt denies fever, chills, nausea, or vomiting.  MSK:as above        Objective      Past Medical History  Past Medical History:   Diagnosis Date    Aneurysm 7-26-24    On scan in ER    Arthritis     Arthritis of back Long history    Celiac disease     Cerebral aneurysm 8/14/2024    Fracture of wrist Aug 21 2022    Boxer’s fravture    H/O bone density study     Hip pain     History of colonoscopy     CSGA    History of mammogram 08/13/2020    Hypertension     Inflammatory arthritis     Lumbago     Lumbar degenerative disc disease     Metatarsalgia, left foot     Osteoarthritis of knees, bilateral     Pelvic fracture     Primary osteoarthritis of both hips     Sciatica     Thoracic spondylosis      Social History     Socioeconomic History    Marital status:    Tobacco Use    Smoking status: Never     Passive exposure: Never    Smokeless tobacco: Never   Vaping Use    Vaping status: Never Used   Substance and Sexual Activity    Alcohol use: Never    Drug use: Never    Sexual activity: Not Currently     Partners: Male     Comment: Pill for 9 years          Physical Exam  /80   Ht 165.1 cm (65\")   Wt 63 kg (138 lb 14.2 oz)   LMP  (LMP Unknown)   BMI 23.11 kg/m²     Body mass index is 23.11 kg/m².    Patient is well nourished and well developed.        Ortho Exam  Right ankle  Cast removed.  Skin: Grossly intact without any redness, warmth or swelling.  Tenderness: No palpable " tenderness on exam to the posterior tib or anterior tib.  Motion: Dorsiflexion and plantarflexion are slightly limited secondary to being immobilized.  Motor/sensory: Grossly intact L2-S1.      Imaging/Labs/EMG Reviewed and Interpreted:  No new imaging today.      Assessment:  1. Posterior tibial tendinitis, right    2. Anterior tibialis tendinitis, right        Plan:  Right posterior tib tendinitis/anterior tib tendinitis--improved with casting.  Patient was placed into a tall walking boot today.  She does not need to wear the boot at night.  Ordered formal PT--[facility]  Recommend OTC NSAIDS/pain medication as needed.  Follow up in 3 months.  Questions and concerns answered.    Physical therapy: Posterior tibial tendon/anterior tibial tendon rehab: 2 times per week for 12 weeks.  First 6 weeks walking in boot only, with orthotic in boot.  Second 6 weeks wean out of boot to shoe with orthotic and continue strengthening- make sure to provide home program.        Francine Vicente PA-C  12/03/24  21:06 EST       Dictated Utilizing Dragon Dictation.

## 2024-12-05 ENCOUNTER — TELEPHONE (OUTPATIENT)
Dept: INTERNAL MEDICINE | Facility: CLINIC | Age: 79
End: 2024-12-05
Payer: MEDICARE

## 2024-12-05 NOTE — TELEPHONE ENCOUNTER
Caller: Jacques Yeboah    Relationship: Self    Best call back number: [phone]     What is the best time to reach you: ANYTIME     Who are you requesting to speak with (clinical staff, provider,  specific staff member): CLINICAL STAFF    What was the call regarding: PATIENT STATES THAT SHE HAS LARYNGITIS. SHE DOESN'T HAVE ANY HEAD OR CHEST DRAINAGE OR CONGESTION AND DOESN'T HAVE A SORE THROAT, JUST LOSING HER VOICE. SHE IS HAS BEEN TAKING DAYQUIL WITH HONEY, THROAT LOZENGES, AND GARGLING WARM SALT WATER. SHE WANTS TO KNOW IF THERE IS ANYTHING THAT SHE NEEDS TO BE DOING OR IF SHE JUST NEEDS TO LET IT RUN ITS COURSE.      Is it okay if the provider responds through [employer]: YES     Yes

## 2024-12-05 NOTE — TELEPHONE ENCOUNTER
Patient will check with her neighbor to see if she has a home covid test.  If she does will take a test and let Dr. Ghotra know the results.

## 2024-12-06 ENCOUNTER — OFFICE VISIT (OUTPATIENT)
Dept: INTERNAL MEDICINE | Facility: CLINIC | Age: 79
End: 2024-12-06
Payer: MEDICARE

## 2024-12-06 VITALS
HEART RATE: 74 BPM | BODY MASS INDEX: 22.66 KG/M2 | RESPIRATION RATE: 20 BRPM | SYSTOLIC BLOOD PRESSURE: 118 MMHG | HEIGHT: 65 IN | DIASTOLIC BLOOD PRESSURE: 72 MMHG | TEMPERATURE: 97.8 F | OXYGEN SATURATION: 98 % | WEIGHT: 136 LBS

## 2024-12-06 DIAGNOSIS — R05.9 COUGH, UNSPECIFIED TYPE: ICD-10-CM

## 2024-12-06 DIAGNOSIS — J40 BRONCHITIS: Primary | ICD-10-CM

## 2024-12-06 RX ORDER — AZITHROMYCIN 250 MG/1
TABLET, FILM COATED ORAL
Qty: 6 TABLET | Refills: 0 | Status: SHIPPED | OUTPATIENT
Start: 2024-12-06

## 2024-12-06 RX ORDER — BENZONATATE 200 MG/1
200 CAPSULE ORAL 3 TIMES DAILY PRN
Qty: 20 CAPSULE | Refills: 0 | Status: SHIPPED | OUTPATIENT
Start: 2024-12-06 | End: 2024-12-16

## 2024-12-06 NOTE — PROGRESS NOTES
Chief Complaint  Cough and URI    Subjective          History of Present Illness  Jacques Yeboah presents to [facility] for   History of Present Illness  Cough:  She has a hx of bronchitis and is worried about things getting worse. Has not had exposure to illness. Has had cough and runny nose and lost voice for the last 3-4 days.  Has been taking dayquil with honey but it is not helping. No n/v/d. No body aches, has mild h/a. No wheeze or SOA.    Cough  This is a new problem. The current episode started yesterday. The problem has been unchanged. The problem occurs intermittent. The cough is Dry. Associated symptoms include headaches and a sore throat. Nothing aggravates the symptoms.   URI   Associated symptoms include coughing, headaches and a sore throat.       The following portions of the patient's history were reviewed and updated as appropriate: allergies, current medications, past family history, past medical history, past social history, past surgical history and problem list.  No Known Allergies    Current Outpatient Medications:     atorvastatin (LIPITOR) 10 MG tablet, Take 1 tablet by mouth Daily., Disp: 90 tablet, Rfl: 1    Calcium Carb-Cholecalciferol (CALCIUM 600 + D PO), Take 1 capsule by mouth 2 (Two) Times a Day., Disp: , Rfl:     clindamycin (CLEOCIN) 300 MG capsule, , Disp: , Rfl:     dilTIAZem XR (DILACOR XR) 120 MG 24 hr capsule, Take 1 capsule by mouth Daily., Disp: 90 capsule, Rfl: 3    losartan (Cozaar) 50 MG tablet, Take 1 tablet by mouth Daily., Disp: 90 tablet, Rfl: 1    azithromycin (Zithromax Z-Romel) 250 MG tablet, Take 2 tablets by mouth on day 1, then 1 tablet daily on days 2-5, Disp: 6 tablet, Rfl: 0    benzonatate (TESSALON) 200 MG capsule, Take 1 capsule by mouth 3 (Three) Times a Day As Needed for Cough for up to 10 days., Disp: 20 capsule, Rfl: 0  New Medications Ordered This Visit   Medications    benzonatate (TESSALON) 200 MG capsule     Sig:  "Take 1 capsule by mouth 3 (Three) Times a Day As Needed for Cough for up to 10 days.     Dispense:  20 capsule     Refill:  0    azithromycin (Zithromax Z-Romel) 250 MG tablet     Sig: Take 2 tablets by mouth on day 1, then 1 tablet daily on days 2-5     Dispense:  6 tablet     Refill:  0     Social History     Tobacco Use   Smoking Status Never    Passive exposure: Never   Smokeless Tobacco Never        Objective   Vital Signs:   Vitals:    12/06/24 1050   BP: 118/72   Pulse: 74   Resp: 20   Temp: 97.8 °F (36.6 °C)   TempSrc: Temporal   SpO2: 98%   Weight: 61.7 kg (136 lb)   Height: 165.1 cm (65\")   PainSc: 0-No pain      Body mass index is 22.63 kg/m².  Physical Exam  Vitals reviewed.   Constitutional:       General: She is not in acute distress.     Appearance: Normal appearance.   HENT:      Head: Normocephalic and atraumatic.      Right Ear: Tympanic membrane, ear canal and external ear normal.      Left Ear: Tympanic membrane, ear canal and external ear normal.      Nose: Nose normal.      Mouth/Throat:      Mouth: Mucous membranes are moist.      Pharynx: No oropharyngeal exudate or posterior oropharyngeal erythema.   Eyes:      General: No scleral icterus.     Extraocular Movements: Extraocular movements intact.      Conjunctiva/sclera: Conjunctivae normal.   Cardiovascular:      Rate and Rhythm: Normal rate and regular rhythm.      Heart sounds: Normal heart sounds. No murmur heard.  Pulmonary:      Effort: Pulmonary effort is normal. No respiratory distress.      Breath sounds: Normal breath sounds. No stridor. No wheezing or rhonchi.   Musculoskeletal:      Cervical back: Normal range of motion and neck supple.   Skin:     General: Skin is warm and dry.      Coloration: Skin is not jaundiced.   Neurological:      General: No focal deficit present.      Mental Status: She is alert and oriented to person, place, and time.      Gait: Gait normal.   Psychiatric:         Mood and Affect: Mood normal.         " Behavior: Behavior normal.        No LMP recorded (lmp unknown). Patient is postmenopausal.  BMI is within normal parameters. No other follow-up for BMI required.      Result Review :              Results for orders placed or performed in visit on 12/06/24   POCT SARS-CoV-2 Antigen RAGHAV + Flu    Collection Time: 12/06/24 12:25 PM    Specimen: Swab   Result Value Ref Range    SARS Antigen Not Detected Not Detected, Presumptive Negative    Influenza A Antigen RAGHAV Not Detected Not Detected    Influenza B Antigen RAGHAV Not Detected Not Detected    Internal Control Passed Passed    Lot Number [id number]     Expiration Date 10/23/2025           Assessment and Plan    Diagnoses and all orders for this visit:    1. Bronchitis (Primary)  Assessment & Plan:  Supportive care, stay hydrated, rest.  Follow up if symptoms worsen or persist. Monitor for new symptoms. Go to the ER for respiratory distress, dehydration, or severe symptoms.  Tx shantelle/zpack, tessalon, consider adding steroids or get xray if cough worsens    Orders:  -     benzonatate (TESSALON) 200 MG capsule; Take 1 capsule by mouth 3 (Three) Times a Day As Needed for Cough for up to 10 days.  Dispense: 20 capsule; Refill: 0  -     azithromycin (Zithromax Z-Romel) 250 MG tablet; Take 2 tablets by mouth on day 1, then 1 tablet daily on days 2-5  Dispense: 6 tablet; Refill: 0    2. Cough, unspecified type  -     POCT SARS-CoV-2 Antigen RAGHAV + Flu        Follow Up   Return if symptoms worsen or fail to improve.    Follow up if symptoms worsen or persist or has new or concerning symptoms, go to ER for severe symptoms.   Reviewed common medication effects and side effects and advised to report side effects immediately.  Encouraged medication compliance and the importance of keeping scheduled follow up appointments with me and any other providers.  If a referral was made please contact our office if you have not heard about an appointment in the next 2 weeks.   If labs or images are  ordered we will contact you with the results within the next week.  If you have not heard from us after a week please call our office to inquire about the results.   Patient was given instructions and counseling regarding her condition or for health maintenance advice. Please see specific information pulled into the AVS if appropriate.     Aimee Emanuel PA-C    * Please note that portions of this note were completed with a voice recognition program.

## 2024-12-06 NOTE — ASSESSMENT & PLAN NOTE
Supportive care, stay hydrated, rest.  Follow up if symptoms worsen or persist. Monitor for new symptoms. Go to the ER for respiratory distress, dehydration, or severe symptoms.  Tx w/zpack, tessalon, consider adding steroids or get xray if cough worsens

## 2024-12-09 ENCOUNTER — TELEPHONE (OUTPATIENT)
Dept: ORTHOPEDIC SURGERY | Facility: CLINIC | Age: 79
End: 2024-12-09

## 2024-12-09 NOTE — TELEPHONE ENCOUNTER
The [facility] received a fax that requires your attention. The document has been indexed to the patient’s chart for your review.      Reason for sending: RECEIVED PHYSICAL THERAPY PLAN OF CARE THAT NEEDS TO BE SIGNED BY THE PROVIDER    Documents Description: PLAN OF CARE-BIANCA DUKES-12/9/2024    Name of Sender: BIANCA DUKES    Date Indexed: 12/9/2024

## 2025-01-25 DIAGNOSIS — I10 PRIMARY HYPERTENSION: ICD-10-CM

## 2025-01-28 RX ORDER — LOSARTAN POTASSIUM 50 MG/1
50 TABLET ORAL DAILY
Qty: 90 TABLET | Refills: 0 | Status: SHIPPED | OUTPATIENT
Start: 2025-01-28

## 2025-01-28 NOTE — TELEPHONE ENCOUNTER
Rx Refill Note  Requested Prescriptions     Pending Prescriptions Disp Refills    losartan (COZAAR) 50 MG tablet [Pharmacy Med Name: LOSARTAN 50MG TABLETS] 90 tablet 0     Sig: TAKE 1 TABLET BY MOUTH DAILY      Last office visit with prescribing clinician: 8/14/2024     Next office visit with prescribing clinician: 03/27/2025    Valerie Pathak  01/28/25, 13:09 EST

## 2025-01-29 ENCOUNTER — OFFICE VISIT (OUTPATIENT)
Dept: INTERNAL MEDICINE | Facility: CLINIC | Age: 80
End: 2025-01-29
Payer: MEDICARE

## 2025-01-29 VITALS
SYSTOLIC BLOOD PRESSURE: 114 MMHG | TEMPERATURE: 98.2 F | WEIGHT: 139 LBS | DIASTOLIC BLOOD PRESSURE: 62 MMHG | HEART RATE: 58 BPM | BODY MASS INDEX: 23.16 KG/M2 | HEIGHT: 65 IN | OXYGEN SATURATION: 99 %

## 2025-01-29 DIAGNOSIS — R05.9 COUGH, UNSPECIFIED TYPE: Primary | ICD-10-CM

## 2025-01-29 DIAGNOSIS — U07.1 COVID-19 VIRUS DETECTED: ICD-10-CM

## 2025-01-29 LAB
EXPIRATION DATE: ABNORMAL
FLUAV AG UPPER RESP QL IA.RAPID: NOT DETECTED
FLUBV AG UPPER RESP QL IA.RAPID: NOT DETECTED
INTERNAL CONTROL: ABNORMAL
Lab: ABNORMAL
SARS-COV-2 AG UPPER RESP QL IA.RAPID: DETECTED

## 2025-01-29 PROCEDURE — 3078F DIAST BP <80 MM HG: CPT | Performed by: STUDENT IN AN ORGANIZED HEALTH CARE EDUCATION/TRAINING PROGRAM

## 2025-01-29 PROCEDURE — 99213 OFFICE O/P EST LOW 20 MIN: CPT | Performed by: STUDENT IN AN ORGANIZED HEALTH CARE EDUCATION/TRAINING PROGRAM

## 2025-01-29 PROCEDURE — 87428 SARSCOV & INF VIR A&B AG IA: CPT | Performed by: STUDENT IN AN ORGANIZED HEALTH CARE EDUCATION/TRAINING PROGRAM

## 2025-01-29 PROCEDURE — 3074F SYST BP LT 130 MM HG: CPT | Performed by: STUDENT IN AN ORGANIZED HEALTH CARE EDUCATION/TRAINING PROGRAM

## 2025-01-29 PROCEDURE — 1126F AMNT PAIN NOTED NONE PRSNT: CPT | Performed by: STUDENT IN AN ORGANIZED HEALTH CARE EDUCATION/TRAINING PROGRAM

## 2025-01-29 NOTE — ASSESSMENT & PLAN NOTE
Acute COVID 19 infection. Mild headache and dry cough without associated symptoms. She appears well on exam. We discussed initiation of Paxlovid given we are within window to start and she has several comorbidities that increase her risk for poor outcomes. We discussed the risk of rebound COVID symptoms with Paxlovid. I have instructed not to take statin while for the next 5 days while taking Paxlovid.

## 2025-01-29 NOTE — PROGRESS NOTES
Office Note     Name: Jacques Yeboah    : 1945     MRN: [id number]     Chief Complaint  Cough (Started  ) and Headache    Subjective     History of Present Illness:  Jacques Yeboah is a 79 y.o. female who presents today for cough, runny nose.     No fevers  No congestion   No shortness if breath  Very mild headache  Not around anyone sick  No seasonal allergies  Not coughing anything up  Using tessalon perles which helps        Review of Systems:   Review of Systems    Past Medical History:   Past Medical History:   Diagnosis Date    Aneurysm 23    On scan in ER    Arthritis     Arthritis of back Long history    Celiac disease     Cerebral aneurysm 2024    Fracture of wrist Aug 21 2022    Boxer’s fravture    H/O bone density study     Hip pain     History of colonoscopy     CSGA    History of mammogram 2020    Hypertension     Inflammatory arthritis     Lumbago     Lumbar degenerative disc disease     Metatarsalgia, left foot     Osteoarthritis of knees, bilateral     Pelvic fracture     Primary osteoarthritis of both hips     Sciatica     Thoracic spondylosis        Past Surgical History:   Past Surgical History:   Procedure Laterality Date    COLONOSCOPY      CSGA    EYE SURGERY      FRACTURE SURGERY      left wrist; pelvis    JOINT REPLACEMENT  2017    TONSILLECTOMY      TOTAL HIP ARTHROPLASTY Right 10/03/2017    Procedure: TOTAL HIP ARTHROPLASTY RIGHT;  Surgeon: Prem Hodges MD;  Location: [facility];  Service:     TUBAL ABDOMINAL LIGATION         Family History:   Family History   Problem Relation Age of Onset    Heart failure Mother         CHF    Osteoarthritis Mother     Heart attack Father         Had a stroke     Stroke Father         Stroke syndrome    ALS Brother     Breast cancer Paternal Aunt         MID 70'S    Ovarian cancer Neg Hx        Social History:   Social History     Socioeconomic History    Marital status:    Tobacco Use     "Smoking status: Never     Passive exposure: Never    Smokeless tobacco: Never   Vaping Use    Vaping status: Never Used   Substance and Sexual Activity    Alcohol use: Never    Drug use: Never    Sexual activity: Not Currently     Partners: Male     Comment: Pill for 9 years       Immunizations:   Immunization History   Administered Date(s) Administered    COVID-19 (PFIZER) Purple Cap Monovalent 10/06/2021, 10/27/2021    Fluad Quad 65+ 10/06/2021    Fluzone High-Dose 65+YRS 10/19/2016, 09/13/2017, 10/11/2018, 10/09/2019, 09/15/2020    Fluzone High-Dose 65+yrs 09/16/2020, 10/08/2022, 10/06/2023    Hepatitis A 11/07/2018    Pneumococcal Conjugate 13-Valent (PCV13) 06/13/2017    Pneumococcal Polysaccharide (PPSV23) 01/24/2020    Shingrix 06/20/2019, 11/06/2019    Tdap 08/21/2022        Medications:     Current Outpatient Medications:     atorvastatin (LIPITOR) 10 MG tablet, Take 1 tablet by mouth Daily., Disp: 90 tablet, Rfl: 1    Calcium Carb-Cholecalciferol (CALCIUM 600 + D PO), Take 1 capsule by mouth 2 (Two) Times a Day., Disp: , Rfl:     dilTIAZem XR (DILACOR XR) 120 MG 24 hr capsule, Take 1 capsule by mouth Daily., Disp: 90 capsule, Rfl: 3    losartan (COZAAR) 50 MG tablet, TAKE 1 TABLET BY MOUTH DAILY, Disp: 90 tablet, Rfl: 0    azithromycin (Zithromax Z-Romel) 250 MG tablet, Take 2 tablets by mouth on day 1, then 1 tablet daily on days 2-5 (Patient not taking: Reported on 1/29/2025), Disp: 6 tablet, Rfl: 0    clindamycin (CLEOCIN) 300 MG capsule, , Disp: , Rfl:     Nirmatrelvir & Ritonavir, 300mg/100mg, (PAXLOVID), Take 3 tablets by mouth 2 (Two) Times a Day for 5 days. DO NOT TAKE ATORVASTATIN WHILE YOU ARE TAKING PAXLOVID., Disp: 30 tablet, Rfl: 0    Allergies:   No Known Allergies    Objective     Vital Signs  /62   Pulse 58   Temp 98.2 °F (36.8 °C) (Infrared)   Ht 165.1 cm (65\")   Wt 63 kg (139 lb)   SpO2 99%   BMI 23.13 kg/m²   Estimated body mass index is 23.13 kg/m² as calculated from the " "following:    Height as of this encounter: 165.1 cm (65\").    Weight as of this encounter: 63 kg (139 lb).    BMI is within normal parameters. No other follow-up for BMI required.       Physical Exam  Constitutional:       Appearance: Normal appearance.   HENT:      Head: Normocephalic and atraumatic.      Nose: Nose normal.   Eyes:      Conjunctiva/sclera: Conjunctivae normal.      Pupils: Pupils are equal, round, and reactive to light.   Cardiovascular:      Rate and Rhythm: Normal rate and regular rhythm.      Comments: 4/6 systolic murmur   Pulmonary:      Effort: Pulmonary effort is normal. No respiratory distress.      Breath sounds: No wheezing.   Abdominal:      General: Abdomen is flat.   Neurological:      General: No focal deficit present.      Mental Status: She is alert and oriented to person, place, and time.   Psychiatric:         Mood and Affect: Mood normal.         Behavior: Behavior normal.         Thought Content: Thought content normal.          Procedures     Results:  Recent Results (from the past 24 hours)   POCT SARS-CoV-2 Antigen RAGAHV + Flu    Collection Time: 01/29/25  1:07 PM    Specimen: Swab   Result Value Ref Range    SARS Antigen Detected (A) Not Detected, Presumptive Negative    Influenza A Antigen RAGHAV Not Detected Not Detected    Influenza B Antigen RAGHAV Not Detected Not Detected    Internal Control Passed Passed    Lot Number [id number]     Expiration Date 02/13/2026         Assessment and Plan     Assessment/Plan:  Diagnoses and all orders for this visit:    1. Cough, unspecified type (Primary)  -     POCT SARS-CoV-2 Antigen RAGHAV + Flu    2. COVID-19 virus detected  Assessment & Plan:  Acute COVID 19 infection. Mild headache and dry cough without associated symptoms. She appears well on exam. We discussed initiation of Paxlovid given we are within window to start and she has several comorbidities that increase her risk for poor outcomes. We discussed the risk of rebound COVID symptoms " with Paxlovid. I have instructed not to take statin while for the next 5 days while taking Paxlovid.       Other orders  -     Nirmatrelvir & Ritonavir, 300mg/100mg, (PAXLOVID); Take 3 tablets by mouth 2 (Two) Times a Day for 5 days. DO NOT TAKE ATORVASTATIN WHILE YOU ARE TAKING PAXLOVID.  Dispense: 30 tablet; Refill: 0        Follow Up  No follow-ups on file.    Jannette Broussard MD   [facility]

## 2025-02-07 ENCOUNTER — TELEPHONE (OUTPATIENT)
Dept: NEUROSURGERY | Facility: CLINIC | Age: 80
End: 2025-02-07
Payer: MEDICARE

## 2025-02-07 NOTE — TELEPHONE ENCOUNTER
PT CALLED AND STATED THAT SPOKE WITH A LADY IN OUR OFFICE THIS MORING WHO NEEDED TO RESCHEDULE HER UPCOMING VISIT W/ DR. CENTENO - SHE DID NOT KNOW WHO IT WAS, BUT SHE WANTED TO LET HER KNOW THAT THE FOLLOWING DAYS DO NOT WORK FOR HER - FEB. 20TH, MARCH THE 4TH AND 6TH, AND THE 11TH ALL DO NOT WORK FOR HER - THE PT STATES THAT WHOEVER CALLED WAS WORKING ON IT RIGHT NOW AND WANTED TO MAKE SURE SHE GOT THIS [facility] ASAP.    CALL BACK # [phone]    CALL BACK ANYTIME

## 2025-02-20 ENCOUNTER — TELEPHONE (OUTPATIENT)
Dept: CARDIOLOGY | Facility: CLINIC | Age: 80
End: 2025-02-20
Payer: MEDICARE

## 2025-02-20 RX ORDER — DILTIAZEM HYDROCHLORIDE 120 MG/1
120 CAPSULE, EXTENDED RELEASE ORAL DAILY
Qty: 90 CAPSULE | Refills: 1 | Status: SHIPPED | OUTPATIENT
Start: 2025-02-20

## 2025-02-20 NOTE — TELEPHONE ENCOUNTER
Caller: Jacques Yeboah    Relationship: Self    Best call back number: [phone]    Requested Prescriptions:   Requested Prescriptions     Pending Prescriptions Disp Refills    dilTIAZem XR (DILACOR XR) 120 MG 24 hr capsule 90 capsule 3     Sig: Take 1 capsule by mouth Daily.        Pharmacy where request should be sent:      Last office visit with prescribing clinician: 2/29/2024   Last telemedicine visit with prescribing clinician: Visit date not found   Next office visit with prescribing clinician: 3/6/2025     Additional details provided by patient: pt would like a 90 day supply    Does the patient have less than a 3 day supply:  [] Yes  [x] No    Would you like a call back once the refill request has been completed: [] Yes [x] No    If the office needs to give you a call back, can they leave a voicemail: [] Yes [x] No    Todd Page Rep   02/20/25 08:38 EST

## 2025-02-27 ENCOUNTER — OFFICE VISIT (OUTPATIENT)
Dept: NEUROSURGERY | Facility: CLINIC | Age: 80
End: 2025-02-27
Payer: MEDICARE

## 2025-02-27 ENCOUNTER — HOSPITAL ENCOUNTER (OUTPATIENT)
Dept: MRI IMAGING | Facility: HOSPITAL | Age: 80
Discharge: HOME | End: 2025-02-27
Admitting: NEUROLOGICAL SURGERY
Payer: MEDICARE

## 2025-02-27 VITALS — HEIGHT: 65 IN | BODY MASS INDEX: 22.49 KG/M2 | WEIGHT: 135 LBS | TEMPERATURE: 98.4 F

## 2025-02-27 DIAGNOSIS — I67.1 SACCULAR ANEURYSM: Primary | ICD-10-CM

## 2025-02-27 DIAGNOSIS — I67.1 SACCULAR ANEURYSM: ICD-10-CM

## 2025-02-27 PROCEDURE — 70546 MR ANGIOGRAPH HEAD W/O&W/DYE: CPT

## 2025-02-27 PROCEDURE — A9577 INJ MULTIHANCE: HCPCS | Performed by: NEUROLOGICAL SURGERY

## 2025-02-27 PROCEDURE — 25510000002 GADOBENATE DIMEGLUMINE 529 MG/ML SOLUTION: Performed by: NEUROLOGICAL SURGERY

## 2025-02-27 PROCEDURE — 99214 OFFICE O/P EST MOD 30 MIN: CPT | Performed by: NEUROLOGICAL SURGERY

## 2025-02-27 RX ADMIN — GADOBENATE DIMEGLUMINE 12 ML: 529 INJECTION, SOLUTION INTRAVENOUS at 12:31

## 2025-02-27 NOTE — PROGRESS NOTES
NAME: SANYA MUSE   DOS: 2025  : 1945  PCP: Zulma Ghotra MD    Chief Complaint:    Chief Complaint   Patient presents with    Follow-up     F/U with MRI Angio. Saccular aneurysm  .       History of Present Illness:  79 y.o. female   An 79-year-old female that I saw in follow-up with a history of a left-sided incidentally noted aneurysm she is undergone spinal taps this was performed for workup for generalized malaise occipital headache and fatigue that was not consistent with a subarachnoid hemorrhage but did go undergo workup with CTA imaging that disclosed the presence of a left-sided aneurysm of approximately 4 mm without evidence of hemorrhage    I saw her and discussed treatment option she deferred that given her advanced age per her reports after I counseled her on the rest she is here for follow-up to ensure there is no enlargement    She has been doing well other than her right foot she denies any ictus to suggest subarachnoid hemorrhage she is here for evaluation    PMHX  Allergies:  No Known Allergies  Medications    Current Outpatient Medications:     atorvastatin (LIPITOR) 10 MG tablet, Take 1 tablet by mouth Daily., Disp: 90 tablet, Rfl: 1    Calcium Carb-Cholecalciferol (CALCIUM 600 + D PO), Take 1 capsule by mouth 2 (Two) Times a Day., Disp: , Rfl:     clindamycin (CLEOCIN) 300 MG capsule, , Disp: , Rfl:     dilTIAZem XR (DILACOR XR) 120 MG 24 hr capsule, Take 1 capsule by mouth Daily., Disp: 90 capsule, Rfl: 1    losartan (COZAAR) 50 MG tablet, TAKE 1 TABLET BY MOUTH DAILY, Disp: 90 tablet, Rfl: 0    azithromycin (Zithromax Z-Romel) 250 MG tablet, Take 2 tablets by mouth on day 1, then 1 tablet daily on days 2-5 (Patient not taking: Reported on 2025), Disp: 6 tablet, Rfl: 0  No current facility-administered medications for this visit.  Past Medical History:  Past Medical History:   Diagnosis Date    Aneurysm 23    On scan in ER    Arthritis     Arthritis of back Long  history    Celiac disease     Cerebral aneurysm 08/14/2024    Fracture of wrist Aug 21 2022    Boxer’s fravture    H/O bone density study     Hip pain     History of colonoscopy     CSGA    History of mammogram 08/13/2020    Hyperlipidemia     On medication    Hypertension     Inflammatory arthritis     Lumbago     Lumbar degenerative disc disease     Metatarsalgia, left foot     Osteoarthritis of knees, bilateral     Pelvic fracture     Primary osteoarthritis of both hips     Sciatica     Thoracic spondylosis      Past Surgical History:  Past Surgical History:   Procedure Laterality Date    COLONOSCOPY      CSGA    EYE SURGERY      FRACTURE SURGERY      left wrist; pelvis    JOINT REPLACEMENT  2017    TONSILLECTOMY      TOTAL HIP ARTHROPLASTY Right 10/03/2017    Procedure: TOTAL HIP ARTHROPLASTY RIGHT;  Surgeon: Prem Hodges MD;  [facility];  Service:     TUBAL ABDOMINAL LIGATION       Social Hx:  Social History     Tobacco Use    Smoking status: Never     Passive exposure: Never    Smokeless tobacco: Never   Vaping Use    Vaping status: Never Used   Substance Use Topics    Alcohol use: Never    Drug use: Never     Family Hx:  Family History   Problem Relation Age of Onset    Heart failure Mother         CHF    Osteoarthritis Mother     Heart attack Father         Had a stroke 1993    Stroke Father         Stroke syndrome    ALS Brother     Breast cancer Paternal Aunt         MID 70'S    Ovarian cancer Neg Hx      Review of Systems:        Review of Systems   Constitutional:  Negative for activity change, appetite change, chills, diaphoresis, fatigue, fever and unexpected weight change.   HENT:  Negative for congestion, dental problem, drooling, ear discharge, ear pain, facial swelling, hearing loss, mouth sores, nosebleeds, postnasal drip, rhinorrhea, sinus pressure, sinus pain, sneezing, sore throat, tinnitus, trouble swallowing and voice change.    Eyes:  Negative for photophobia, pain,  discharge, redness, itching and visual disturbance.   Respiratory:  Negative for apnea, cough, choking, chest tightness, shortness of breath, wheezing and stridor.    Cardiovascular:  Negative for chest pain, palpitations and leg swelling.   Gastrointestinal:  Negative for abdominal distention, abdominal pain, anal bleeding, blood in stool, constipation, diarrhea, nausea, rectal pain and vomiting.   Endocrine: Negative for cold intolerance, heat intolerance, polydipsia, polyphagia and polyuria.   Genitourinary:  Negative for decreased urine volume, difficulty urinating, dyspareunia, dysuria, enuresis, flank pain, frequency, genital sores, hematuria, menstrual problem, pelvic pain, urgency, vaginal bleeding, vaginal discharge and vaginal pain.   Musculoskeletal:  Negative for arthralgias, back pain, gait problem, joint swelling, myalgias, neck pain and neck stiffness.   Skin:  Negative for color change, pallor, rash and wound.   Allergic/Immunologic: Negative for environmental allergies, food allergies and immunocompromised state.   Neurological:  Negative for dizziness, tremors, seizures, syncope, facial asymmetry, speech difficulty, weakness, light-headedness, numbness and headaches.   Hematological:  Negative for adenopathy. Does not bruise/bleed easily.   Psychiatric/Behavioral:  Negative for agitation, behavioral problems, confusion, decreased concentration, dysphoric mood, hallucinations, self-injury, sleep disturbance and suicidal ideas. The patient is not nervous/anxious and is not hyperactive.    All other systems reviewed and are negative.     I have reviewed this note template and all pertinent parts of the review of systems social, family history, surgical history and medication list    Physical Examination:  Vitals:    02/27/25 1347   Temp: 98.4 °F (36.9 °C)      General Appearance:   Well developed, well nourished, well groomed, alert, and cooperative.  Neurological examination:  Neurological Exam   She  is wide-awake alert and follows commands    Pupils are symmetric    Her pupils are also postsurgical    She has no anisocoria extraocular movements intact she has no motor drift gait and station are all normal    She is walking in a boot with the assistance of walker for balance but is a GCS of 15 without neurologic symptoms    Review of Imaging/DATA:  I personally reviewed and interpreted a CTA of the head demonstrates the presence of a 4 mm left sided posterior communicating artery aneurysm the MRA appears stable in its configuration of this as well as the potential small infundibulum noted this is in the supraclinoid space and would be suspected to be broad-based and require stent assisted coiling or flow redirection  Diagnoses/Plan:    Ms. Yeboah is a 79 y.o. female   1.  Small left-sided 4 mm supraclinoid aneurysm  Incidentally found for generalized malaise with negative CSF studies and no ictus to suggest subarachnoid hemorrhage    I talked her about treatment options I can go through a radial approach do a diagnostic angiogram and potentially treat her with either flow diversion or perhaps coiling I explained the risk of stroke and anesthesia complications and the fact that she would likely need dual antiplatelet therapy after extensive discussion shared decision making about the risk of this with based on Isuia should be low I placed a risk of rupture somewhere around 1 %/year but explained that that does not mean Zerozole.  After these discussions she is expressed understanding and wishes to proceed with monitoring    I explained that if she wishes to undergo treatments for this would be more than happy to oblige but I agree that that is a perfectly reasonable decision    Plan will be for follow-up MRa in 1 year

## 2025-03-04 ENCOUNTER — OFFICE VISIT (OUTPATIENT)
Dept: ORTHOPEDIC SURGERY | Facility: CLINIC | Age: 80
End: 2025-03-04
Payer: MEDICARE

## 2025-03-04 VITALS
WEIGHT: 138.4 LBS | HEIGHT: 65 IN | DIASTOLIC BLOOD PRESSURE: 60 MMHG | BODY MASS INDEX: 23.06 KG/M2 | SYSTOLIC BLOOD PRESSURE: 114 MMHG

## 2025-03-04 DIAGNOSIS — M76.811 ANTERIOR TIBIALIS TENDINITIS, RIGHT: ICD-10-CM

## 2025-03-04 DIAGNOSIS — M76.821 POSTERIOR TIBIAL TENDINITIS, RIGHT: Primary | ICD-10-CM

## 2025-03-04 PROCEDURE — 3074F SYST BP LT 130 MM HG: CPT | Performed by: PHYSICIAN ASSISTANT

## 2025-03-04 PROCEDURE — 3078F DIAST BP <80 MM HG: CPT | Performed by: PHYSICIAN ASSISTANT

## 2025-03-04 PROCEDURE — 99212 OFFICE O/P EST SF 10 MIN: CPT | Performed by: PHYSICIAN ASSISTANT

## 2025-03-04 NOTE — PROGRESS NOTES
"    [facility] Note        Subjective     CC: Follow-up (3 month follow up--Posterior tibial tendinitis, right)      HPI    Jacques Yeboah is a 80 y.o. female.  Patient returns today for follow-up posterior tib tendinitis and anterior tib tendinitis.  She is doing well.  She has been participating in PT which she just finished this past Friday.  She has an HEP that she can do for maintenance.    Pain scale: 0/10.    Overall, patient's symptoms are improved.    ROS:    Constiutional:Pt denies fever, chills, nausea, or vomiting.  MSK:as above        Objective      Past Medical History  Past Medical History:   Diagnosis Date    Aneurysm 7-26-24    On scan in ER    Arthritis     Arthritis of back Long history    Celiac disease     Cerebral aneurysm 08/14/2024    Fracture of wrist Aug 21 2022    Boxer’s fravture    H/O bone density study     Hip pain     History of colonoscopy     CSGA    History of mammogram 08/13/2020    Hyperlipidemia     On medication    Hypertension     Inflammatory arthritis     Lumbago     Lumbar degenerative disc disease     Metatarsalgia, left foot     Osteoarthritis of knees, bilateral     Pelvic fracture     Primary osteoarthritis of both hips     Sciatica     Thoracic spondylosis      Social History     Socioeconomic History    Marital status:    Tobacco Use    Smoking status: Never     Passive exposure: Never    Smokeless tobacco: Never   Vaping Use    Vaping status: Never Used   Substance and Sexual Activity    Alcohol use: Never    Drug use: Never    Sexual activity: Not Currently     Partners: Male     Birth control/protection: Birth control pill     Comment: Pill for 9 years          Physical Exam  /60   Ht 165.1 cm (65\")   Wt 62.8 kg (138 lb 6.4 oz)   LMP  (LMP Unknown)   BMI 23.03 kg/m²     Body mass index is 23.03 kg/m².    Patient is well nourished and well developed.        Ortho Exam  Right ankle  Skin: Grossly intact without any " redness, warmth or swelling.  Tenderness: No palpable tenderness on exam.  Motion: Able to demonstrate firing of dorsiflexors and plantar flexors.  Motor/sensory: Grossly intact L2-S1.      Imaging/Labs/EMG Reviewed and Interpreted:  No new imaging today.      Assessment:  1. Posterior tibial tendinitis, right    2. Anterior tibialis tendinitis, right        Plan:  Right posterior tib tendinitis/anterior tib tendinitis--doing well.  May discontinue walking boot and return to wearing regular shoes.  Continue with HEP provided by [facility]  Recommend OTC NSAIDS/pain medication as needed.  Follow up as needed.  Questions and concerns answered.      Francine Vicente PA-C  03/04/25  21:19 EST      Dictated Utilizing Dragon Dictation.

## 2025-03-06 ENCOUNTER — OFFICE VISIT (OUTPATIENT)
Dept: CARDIOLOGY | Facility: CLINIC | Age: 80
End: 2025-03-06
Payer: MEDICARE

## 2025-03-06 VITALS
SYSTOLIC BLOOD PRESSURE: 120 MMHG | WEIGHT: 136 LBS | DIASTOLIC BLOOD PRESSURE: 64 MMHG | HEART RATE: 79 BPM | HEIGHT: 65 IN | BODY MASS INDEX: 22.66 KG/M2 | OXYGEN SATURATION: 98 %

## 2025-03-06 DIAGNOSIS — I35.1 NONRHEUMATIC AORTIC VALVE INSUFFICIENCY: ICD-10-CM

## 2025-03-06 DIAGNOSIS — I10 PRIMARY HYPERTENSION: ICD-10-CM

## 2025-03-06 DIAGNOSIS — E78.2 MIXED HYPERLIPIDEMIA: ICD-10-CM

## 2025-03-06 DIAGNOSIS — I10 ESSENTIAL HYPERTENSION: Primary | ICD-10-CM

## 2025-03-06 PROCEDURE — 3078F DIAST BP <80 MM HG: CPT | Performed by: INTERNAL MEDICINE

## 2025-03-06 PROCEDURE — 3074F SYST BP LT 130 MM HG: CPT | Performed by: INTERNAL MEDICINE

## 2025-03-06 PROCEDURE — 99213 OFFICE O/P EST LOW 20 MIN: CPT | Performed by: INTERNAL MEDICINE

## 2025-03-06 RX ORDER — DILTIAZEM HYDROCHLORIDE 120 MG/1
120 CAPSULE, EXTENDED RELEASE ORAL DAILY
Qty: 90 CAPSULE | Refills: 1 | Status: SHIPPED | OUTPATIENT
Start: 2025-03-06

## 2025-03-06 RX ORDER — LOSARTAN POTASSIUM 50 MG/1
50 TABLET ORAL DAILY
Qty: 90 TABLET | Refills: 0 | Status: SHIPPED | OUTPATIENT
Start: 2025-03-06

## 2025-03-06 NOTE — PROGRESS NOTES
Follow-up Visit      Date: 2025  Patient Name: Jacques Yeboah  : 1945   MRN: [id number]     Chief Complaint:    Chief Complaint   Patient presents with    Primary hypertension       History of Present Illness: Jacques Yeboah is a 80 y.o. female who is here today for follow-up on her multiple medical problems.    Patient does not have any significant coronary artery problems.  She denies any chest pain any shortness of breath any dizziness any palpitations or any other symptoms.  She denies any lower extremity edema.  She denies any paroxysmal nocturnal dyspnea.  She denies any orthopnea.  She does have little tendinitis.    She denies any bleeding.  She denies any weight loss or any weight gain.  She denies any symptoms of claudication.      Problem List     CARDIAC  Coronary Artery Disease:   Low risk                Myocardium:   Echo, 2023: LVEF 60%       Valvular:   Mild AI, mild MR, TR        Electrical:   Normal sinus rhythm        Pericardium:   Normal         CARDIAC RISK FACTORS  Hypertension  Diabetes  2024: 5.6  Dyslipidemia  3/3/2023:  TG 54 HDL 87 LDL 58     NON-CARDIAC  Arthritis  Celiac disease  Osteopenia     SURGERIES  Right hip replacement  Cataract surgery  Tonsillectomy  Total abdominal ligation      Subjective      Review of Systems:   Review of Systems   All other systems reviewed and are negative.      Medications:     Current Outpatient Medications:     atorvastatin (LIPITOR) 10 MG tablet, Take 1 tablet by mouth Daily., Disp: 90 tablet, Rfl: 1    Calcium Carb-Cholecalciferol (CALCIUM 600 + D PO), Take 1 capsule by mouth 2 (Two) Times a Day., Disp: , Rfl:     clindamycin (CLEOCIN) 300 MG capsule, , Disp: , Rfl:     dilTIAZem XR (DILACOR XR) 120 MG 24 hr capsule, Take 1 capsule by mouth Daily., Disp: 90 capsule, Rfl: 1    losartan (COZAAR) 50 MG tablet, TAKE 1 TABLET BY MOUTH DAILY, Disp: 90 tablet, Rfl: 0    Allergies:   No Known Allergies    Objective  "    Physical Exam:  Vitals:    03/06/25 1049   BP: 120/64   BP Location: Left arm   Patient Position: Sitting   Cuff Size: Adult   Pulse: 79   SpO2: 98%   Weight: 61.7 kg (136 lb)   Height: 165.1 cm (65\")     Body mass index is 22.63 kg/m².    Constitutional:       General: Not in acute distress.     Appearance: Healthy appearance. Not in distress.     Neck:     JVP:Not elevated     Carotid artery: Normal    Pulmonary:      Effort: Pulmonary effort is normal.      Breath sounds: Normal breath sounds. No wheezing. No rhonchi. No rales.     Cardiovascular:      Normal rate. Regular rhythm. Normal S1. Normal S2.      Murmurs: There is 2/6 murmur.      No gallop. No click. No rub.     Abdominal:      General: Bowel sounds are normal.      Palpations: Abdomen is soft.      Tenderness: There is no abdominal tenderness.    Extremities:     Pulses:Normal radial and pedal pulses     Edema:no edema    Smoking Cessation:   Tobacco Product History : Patient never smoked    Lab Review:   Lab Results   Component Value Date    GLUCOSE 97 07/26/2024    BUN 16 07/26/2024    CREATININE 0.72 07/26/2024    EGFRIFNONA 93 02/07/2022    BCR 22.2 07/26/2024    K 4.5 07/26/2024    CO2 32.0 (H) 07/26/2024    CALCIUM 9.8 07/26/2024    ALBUMIN 4.1 07/26/2024    AST 18 07/26/2024    ALT 14 07/26/2024     Lab Results   Component Value Date    WBC 3.99 07/26/2024    HGB 13.4 07/26/2024    HCT 41.7 07/26/2024    MCV 96.1 07/26/2024     07/26/2024     Lab Results   Component Value Date    TSH 1.570 03/14/2024             Assessment / Plan      Assessment:   Diagnosis Plan   1. Essential hypertension        2. Mixed hyperlipidemia        3. Nonrheumatic aortic valve insufficiency             Plan:  Patient blood pressure has been running good and she has brought her medications and blood pressure numbers and looks fine at home.  Her cholesterol has been good and following with Daisy Ghotra.  Her valves looks fine and there is no change in " murmur intensity.  We will check in next few years.    Follow Up:       Return in about 1 year (around 3/6/2026).    Mehran Monteiro MD

## 2025-03-25 NOTE — PROGRESS NOTES
Subjective   The ABCs of the Annual Wellness Visit  Medicare Wellness Visit      Jacques Yeboah is a 80 y.o. patient who presents for a Medicare Wellness Visit.    The following portions of the patient's history were reviewed and   updated as appropriate: allergies, current medications, past family history, past medical history, past social history, past surgical history, and problem list.    Compared to one year ago, the patient's physical   health is the same.  Compared to one year ago, the patient's mental   health is the same.    Recent Hospitalizations:  She was not admitted to the hospital during the last year.     Current Medical Providers:  Patient Care Team:  Zulma Ghotra MD as PCP - General (Internal Medicine)  Miguel Moore MD as Consulting Physician (Cardiology)  Tor Le MD as Consulting Physician (Gastroenterology)  Anthony Lopez OD (Optometry)  Prem Hodges MD as Consulting Physician (Orthopedic Surgery)  Prem Stein DPM (Podiatry)  Dawson Donohue MD as Consulting Physician (Dermatology)  Dr. Mic Castellanos (Dental General Practice)  Jaspreet Raya MD as Consulting Physician (Colon and Rectal Surgery)  Rufino Mitchell MD as Surgeon (Neurosurgery)  Zulma Ghotra MD as Primary Care Provider (Internal Medicine)  Francine Vicente PA-C as Physician Assistant (Physician Assistant)  Mehran Monteiro MD as Cardiologist (Cardiology)    Outpatient Medications Prior to Visit   Medication Sig Dispense Refill    atorvastatin (LIPITOR) 10 MG tablet Take 1 tablet by mouth Daily. 90 tablet 1    Calcium Carb-Cholecalciferol (CALCIUM 600 + D PO) Take 1 capsule by mouth 2 (Two) Times a Day.      clindamycin (CLEOCIN) 300 MG capsule       dilTIAZem XR (DILACOR XR) 120 MG 24 hr capsule Take 1 capsule by mouth Daily. 90 capsule 1    losartan (COZAAR) 50 MG tablet Take 1 tablet by mouth Daily. 90 tablet 0     No facility-administered medications prior to visit.  "    No opioid medication identified on active medication list. I have reviewed chart for other potential  high risk medication/s and harmful drug interactions in the elderly.      Aspirin is not on active medication list.  Aspirin use is not indicated based on review of current medical condition/s. Risk of harm outweighs potential benefits.  .    Patient Active Problem List   Diagnosis    Hyperlipidemia    Arthritis of right hip    Status post total replacement of right hip    Celiac disease    Primary osteoarthritis of left hip    Medical orders for life-sustaining treatment (MOLST) form in chart    Bronchitis    Elevated glucose    Primary hypertension    Cerebral aneurysm    Cough    COVID-19 virus detected     Advance Care Planning Advance Directive is on file.  ACP discussion was held with the patient during this visit. Patient has an advance directive in EMR which is still valid.             Objective   Vitals:    03/27/25 0809   BP: 116/74   BP Location: Left arm   Patient Position: Sitting   Cuff Size: Adult   Pulse: 65   Resp: 14   Temp: 97.5 °F (36.4 °C)   TempSrc: Temporal   SpO2: 100%   Weight: 61.8 kg (136 lb 3.2 oz)   Height: 165.1 cm (65\")   PainSc: 0-No pain       Estimated body mass index is 22.66 kg/m² as calculated from the following:    Height as of this encounter: 165.1 cm (65\").    Weight as of this encounter: 61.8 kg (136 lb 3.2 oz).    BMI is within normal parameters. No other follow-up for BMI required.           Does the patient have evidence of cognitive impairment? No                                                                                                Health  Risk Assessment    Smoking Status:  Social History     Tobacco Use   Smoking Status Never    Passive exposure: Never   Smokeless Tobacco Never     Alcohol Consumption:  Social History     Substance and Sexual Activity   Alcohol Use Never       Fall Risk Screen  STEADI Fall Risk Assessment was completed, and patient is at " LOW risk for falls.Assessment completed on:3/27/2025    Depression Screening   Little interest or pleasure in doing things? Not at all   Feeling down, depressed, or hopeless? Not at all   PHQ-2 Total Score 0      Health Habits and Functional and Cognitive Screening:      3/27/2025     8:15 AM   Functional & Cognitive Status   Do you have difficulty preparing food and eating? No   Do you have difficulty bathing yourself, getting dressed or grooming yourself? No   Do you have difficulty using the toilet? No   Do you have difficulty moving around from place to place? No   Do you have trouble with steps or getting out of a bed or a chair? No   Current Diet Well Balanced Diet   Dental Exam Up to date   Eye Exam Up to date   Exercise (times per week) 3 times per week   Current Exercises Include Kevin Chi;Other   Do you need help using the phone?  No   Are you deaf or do you have serious difficulty hearing?  No   Do you need help to go to places out of walking distance? No   Do you need help shopping? No   Do you need help preparing meals?  No   Do you need help with housework?  No   Do you need help with laundry? No   Do you need help taking your medications? No   Do you need help managing money? No   Do you ever drive or ride in a car without wearing a seat belt? No   Have you felt unusual stress, anger or loneliness in the last month? No   Who do you live with? Alone   If you need help, do you have trouble finding someone available to you? No   Have you been bothered in the last four weeks by sexual problems? No   Do you have difficulty concentrating, remembering or making decisions? No           Age-appropriate Screening Schedule:  Refer to the list below for future screening recommendations based on patient's age, sex and/or medical conditions. Orders for these recommended tests are listed in the plan section. The patient has been provided with a written plan.    Health Maintenance List  Health Maintenance   Topic Date  Due    DIABETIC FOOT EXAM  Never done    RSV Vaccine - Adults (1 - 1-dose 75+ series) Never done    MOST FORM  06/16/2023    HEMOGLOBIN A1C  02/14/2025    ANNUAL WELLNESS VISIT  03/14/2025    LIPID PANEL  03/14/2025    COVID-19 Vaccine (3 - 2024-25 season) 09/23/2025 (Originally 9/1/2024)    DIABETIC EYE EXAM  10/02/2025    COLORECTAL CANCER SCREENING  09/17/2029    TDAP/TD VACCINES (2 - Td or Tdap) 08/21/2032    INFLUENZA VACCINE  Completed    Pneumococcal Vaccine 50+  Completed    ZOSTER VACCINE  Completed    MAMMOGRAM  Discontinued    URINE MICROALBUMIN-CREATININE RATIO (uACR)  Discontinued    DXA SCAN  Discontinued                                                                                                                                                CMS Preventative Services Quick Reference  Risk Factors Identified During Encounter  None Identified    The above risks/problems have been discussed with the patient.  Pertinent information has been shared with the patient in the After Visit Summary.  An After Visit Summary and PPPS were made available to the patient.    Follow Up:   Next Medicare Wellness visit to be scheduled in 1 year.         Additional E&M Note during same encounter follows:  Patient has additional, significant, and separately identifiable condition(s)/problem(s) that require work above and beyond the Medicare Wellness Visit     Chief Complaint  Medicare Wellness-subsequent    Subjective   HPI      Review of Systems   Constitutional:  Positive for activity change. Negative for appetite change, fatigue and fever.   HENT:  Positive for tinnitus.    Eyes:  Positive for visual disturbance (uses glasses).   Respiratory:  Negative for shortness of breath.    Cardiovascular:  Negative for chest pain and leg swelling.   Gastrointestinal:  Negative for abdominal pain, constipation and diarrhea.        Takes MiraLAX daily which keeps her regular   Musculoskeletal:  Positive for arthralgias (feet)  "and myalgias.   Allergic/Immunologic: Negative for immunocompromised state.   Neurological:  Negative for seizures, syncope, speech difficulty, weakness and confusion.        HTN - she is on losartan 50 (we had changed from lisinopril secondary to cough) and diltiazem . She does check at home and usually gets in the 120-130/70 range      She did have foot pain and tendonitis and has been seeing ortho - had to be casted on the R foot, and is out of this, using cane now and gradually increasing her activity but has not been as active because of it    Hyperlipidemia-she is on lipitor 10.  She is tolerating. She is fasting today    Cerebral aneurysm - pt had a f/u MRA and saw Dr Mitchell in 2/25 and aneurysm was stable- 1 yr f/u     Celiac disease-she follows gluten free diet for the most part     Elevated glucose-last A1c was 5.6 in 8/24.  She does try to watch her diet    Exercise: not as regualrly with her foot injury  Diet:healthy overall - lives in independent living but has meals at the assisted living.  gluten free. Does ca/vit D and milk w/ meals, likes dairy      Objective   Vital Signs:  /74 (BP Location: Left arm, Patient Position: Sitting, Cuff Size: Adult)   Pulse 65   Temp 97.5 °F (36.4 °C) (Temporal)   Resp 14   Ht 165.1 cm (65\")   Wt 61.8 kg (136 lb 3.2 oz)   SpO2 100%   BMI 22.66 kg/m²   Physical Exam  Vitals and nursing note reviewed.   Constitutional:       General: She is not in acute distress.     Appearance: She is well-developed. She is not diaphoretic.   HENT:      Head: Normocephalic and atraumatic.      Right Ear: External ear normal.      Left Ear: External ear normal.      Nose: Nose normal.      Mouth/Throat:      Pharynx: No oropharyngeal exudate.   Eyes:      General: No scleral icterus.        Right eye: No discharge.         Left eye: No discharge.      Conjunctiva/sclera: Conjunctivae normal.      Pupils: Pupils are equal, round, and reactive to light.   Neck:      Thyroid: " No thyromegaly.   Cardiovascular:      Rate and Rhythm: Normal rate and regular rhythm.      Heart sounds: Murmur (2/6 sys) heard.      No friction rub. No gallop.   Pulmonary:      Effort: Pulmonary effort is normal. No respiratory distress.      Breath sounds: Normal breath sounds. No wheezing or rales.   Chest:   Breasts:     Right: No mass, nipple discharge, skin change or tenderness.      Left: No mass, nipple discharge, skin change or tenderness.   Abdominal:      General: Bowel sounds are normal. There is no distension.      Palpations: Abdomen is soft. There is no mass.      Tenderness: There is no abdominal tenderness. There is no guarding or rebound.   Musculoskeletal:         General: No deformity. Normal range of motion.      Cervical back: Normal range of motion and neck supple.   Lymphadenopathy:      Cervical: No cervical adenopathy.   Skin:     General: Skin is warm and dry.      Coloration: Skin is not pale.      Findings: No erythema or rash.   Neurological:      Mental Status: She is alert and oriented to person, place, and time.      Coordination: Coordination normal.      Deep Tendon Reflexes: Reflexes normal.   Psychiatric:         Behavior: Behavior normal.         Thought Content: Thought content normal.         Judgment: Judgment normal.                Assessment and Plan      Encounter for Medicare annual wellness exam  Miracle scheduled 10/25  Colon due' 29 - probably won't do any more given age over 80  living will-patient has and is on file; MOLST form renewed today as well and scanned into chart. Pt kept original. Pt wants to keep terms the same  Pneumovax-had  Prevnar 13-had-we will go ahead with Prevnar 20 today since it has been over 5 years since her last pneumonia vaccine  DT due in '32  RSV- recommneded getting  Flu vaccine - she had  COVID - she declines  DEXA due but she declines .  shingles-had Shingrix  Does not need paps since age 65 or older and has had normal paps in past          Pure hypercholesterolemia   On lipitor, check today    Orders:    CBC (No Diff); Future    TSH Rfx On Abnormal To Free T4; Future    Lipid Panel; Future    Comprehensive Metabolic Panel; Future    Primary hypertension  Good control    Orders:    CBC (No Diff); Future    TSH Rfx On Abnormal To Free T4; Future    Lipid Panel; Future    Comprehensive Metabolic Panel; Future    Elevated glucose  Continue working on healthy diet, check a1c  Orders:    Hemoglobin A1c; Future            Follow Up   No follow-ups on file.  Patient was given instructions and counseling regarding her condition or for health maintenance advice. Please see specific information pulled into the AVS if appropriate.

## 2025-03-26 NOTE — ASSESSMENT & PLAN NOTE
On lipitor, check today    Orders:    CBC (No Diff); Future    TSH Rfx On Abnormal To Free T4; Future    Lipid Panel; Future    Comprehensive Metabolic Panel; Future

## 2025-03-27 ENCOUNTER — LAB (OUTPATIENT)
Dept: INTERNAL MEDICINE | Facility: CLINIC | Age: 80
End: 2025-03-27
Payer: MEDICARE

## 2025-03-27 ENCOUNTER — OFFICE VISIT (OUTPATIENT)
Dept: INTERNAL MEDICINE | Facility: CLINIC | Age: 80
End: 2025-03-27
Payer: MEDICARE

## 2025-03-27 VITALS
BODY MASS INDEX: 22.69 KG/M2 | WEIGHT: 136.2 LBS | HEART RATE: 65 BPM | HEIGHT: 65 IN | OXYGEN SATURATION: 100 % | SYSTOLIC BLOOD PRESSURE: 116 MMHG | RESPIRATION RATE: 14 BRPM | DIASTOLIC BLOOD PRESSURE: 74 MMHG | TEMPERATURE: 97.5 F

## 2025-03-27 DIAGNOSIS — I10 PRIMARY HYPERTENSION: ICD-10-CM

## 2025-03-27 DIAGNOSIS — R73.09 ELEVATED GLUCOSE: ICD-10-CM

## 2025-03-27 DIAGNOSIS — Z00.00 ENCOUNTER FOR MEDICARE ANNUAL WELLNESS EXAM: Primary | ICD-10-CM

## 2025-03-27 DIAGNOSIS — E78.00 PURE HYPERCHOLESTEROLEMIA: Chronic | ICD-10-CM

## 2025-03-27 DIAGNOSIS — Z23 NEED FOR PNEUMOCOCCAL 20-VALENT CONJUGATE VACCINATION: ICD-10-CM

## 2025-03-27 LAB
ALBUMIN SERPL-MCNC: 4.3 G/DL (ref 3.5–5.2)
ALBUMIN/GLOB SERPL: 1.5 G/DL
ALP SERPL-CCNC: 85 U/L (ref 39–117)
ALT SERPL W P-5'-P-CCNC: 16 U/L (ref 1–33)
ANION GAP SERPL CALCULATED.3IONS-SCNC: 8.1 MMOL/L (ref 5–15)
AST SERPL-CCNC: 19 U/L (ref 1–32)
BILIRUB SERPL-MCNC: 0.5 MG/DL (ref 0–1.2)
BUN SERPL-MCNC: 22 MG/DL (ref 8–23)
BUN/CREAT SERPL: 28.6 (ref 7–25)
CALCIUM SPEC-SCNC: 9.8 MG/DL (ref 8.6–10.5)
CHLORIDE SERPL-SCNC: 103 MMOL/L (ref 98–107)
CHOLEST SERPL-MCNC: 164 MG/DL (ref 0–200)
CO2 SERPL-SCNC: 27.9 MMOL/L (ref 22–29)
CREAT SERPL-MCNC: 0.77 MG/DL (ref 0.57–1)
DEPRECATED RDW RBC AUTO: 41.8 FL (ref 37–54)
EGFRCR SERPLBLD CKD-EPI 2021: 77.6 ML/MIN/1.73
ERYTHROCYTE [DISTWIDTH] IN BLOOD BY AUTOMATED COUNT: 12.2 % (ref 12.3–15.4)
GLOBULIN UR ELPH-MCNC: 2.8 GM/DL
GLUCOSE SERPL-MCNC: 105 MG/DL (ref 65–99)
HBA1C MFR BLD: 5.3 % (ref 4.8–5.6)
HCT VFR BLD AUTO: 44.5 % (ref 34–46.6)
HDLC SERPL-MCNC: 83 MG/DL (ref 40–60)
HGB BLD-MCNC: 14.4 G/DL (ref 12–15.9)
LDLC SERPL CALC-MCNC: 71 MG/DL (ref 0–100)
LDLC/HDLC SERPL: 0.87 {RATIO}
MCH RBC QN AUTO: 30.8 PG (ref 26.6–33)
MCHC RBC AUTO-ENTMCNC: 32.4 G/DL (ref 31.5–35.7)
MCV RBC AUTO: 95.1 FL (ref 79–97)
PLATELET # BLD AUTO: 255 10*3/MM3 (ref 140–450)
PMV BLD AUTO: 10.4 FL (ref 6–12)
POTASSIUM SERPL-SCNC: 4.7 MMOL/L (ref 3.5–5.2)
PROT SERPL-MCNC: 7.1 G/DL (ref 6–8.5)
RBC # BLD AUTO: 4.68 10*6/MM3 (ref 3.77–5.28)
SODIUM SERPL-SCNC: 139 MMOL/L (ref 136–145)
TRIGL SERPL-MCNC: 44 MG/DL (ref 0–150)
TSH SERPL DL<=0.05 MIU/L-ACNC: 2.17 UIU/ML (ref 0.27–4.2)
VLDLC SERPL-MCNC: 10 MG/DL (ref 5–40)
WBC NRBC COR # BLD AUTO: 3.89 10*3/MM3 (ref 3.4–10.8)

## 2025-03-27 PROCEDURE — 36415 COLL VENOUS BLD VENIPUNCTURE: CPT | Performed by: INTERNAL MEDICINE

## 2025-03-27 PROCEDURE — 84443 ASSAY THYROID STIM HORMONE: CPT | Performed by: INTERNAL MEDICINE

## 2025-03-27 PROCEDURE — 85027 COMPLETE CBC AUTOMATED: CPT | Performed by: INTERNAL MEDICINE

## 2025-03-27 PROCEDURE — 80053 COMPREHEN METABOLIC PANEL: CPT | Performed by: INTERNAL MEDICINE

## 2025-03-27 PROCEDURE — 80061 LIPID PANEL: CPT | Performed by: INTERNAL MEDICINE

## 2025-03-27 PROCEDURE — 83036 HEMOGLOBIN GLYCOSYLATED A1C: CPT | Performed by: INTERNAL MEDICINE

## 2025-03-27 NOTE — LETTER
[facility]  Vaccine Consent Form    Patient Name:  Jacques Yeboah  Patient :  1945        Screening Checklist  The following questions should be completed prior to vaccination. If you answer “yes” to any question, it does not necessarily mean you should not be vaccinated. It just means we may need to clarify or ask more questions. If a question is unclear, please ask your healthcare provider to explain it.    Yes No   Any fever or moderate to severe illness today (mild illness and/or antibiotic treatment are not contraindications)?     Do you have a history of a serious reaction to any previous vaccinations, such as anaphylaxis, encephalopathy within 7 days, Guillain-[place] syndrome within 6 weeks, seizure?     Have you received any live vaccine(s) (e.g MMR, SURJIT) or any other vaccines in the last month (to ensure duplicate doses aren't given)?     Do you have an anaphylactic allergy to latex (DTaP, DTaP-IPV, Hep A, Hep B, MenB, RV, Td, Tdap), baker’s yeast (Hep B, HPV), polysorbates (RSV, nirsevimab, PCV 20, Rotavirrus, Tdap, Shingrix), or gelatin (SURJIT, MMR)?     Do you have an anaphylactic allergy to neomycin (Rabies, SURJIT, MMR, IPV, Hep A), polymyxin B (IPV), or streptomycin (IPV)?      Any cancer, leukemia, AIDS, or other immune system disorder? (SURJIT, MMR, RV)     Do you have a parent, brother, or sister with an immune system problem (if immune competence of vaccine recipient clinically verified, can proceed)? (MMR, SURJIT)     Any recent steroid treatments for >2 weeks, chemotherapy, or radiation treatment? (SURJIT, MMR)     Have you received antibody-containing blood transfusions or IVIG in the past 11 months (recommended interval is dependent on product)? (MMR, SURJIT)     Have you taken antiviral drugs (acyclovir, famciclovir, valacyclovir for SURJIT) in the last 24 or 48 hours, respectively?      Are you pregnant or planning to become pregnant within 1 month? (SURJIT, MMR, HPV, IPV, MenB, Abrexvy; For Hep B-  "refer to Engerix-B; For RSV - Abrysvo is indicated for 32-36 weeks of pregnancy from September to January)     For infants, have you ever been told your child has had intussusception or a medical emergency involving obstruction of the intestine (Rotavirus)? If not for an infant, can skip this question.         *Ordering Physicians/APC should be consulted if \"yes\" is checked by the patient or guardian above.  I have received, read, and understand the Vaccine Information Statement (VIS) for each vaccine ordered.  I have considered my or my child's health status as well as the health status of my close contacts.  I have taken the opportunity to discuss my vaccine questions with my or my child's health care provider.   I have requested that the ordered vaccine(s) be given to me or my child.  I understand the benefits and risks of the vaccines.  I understand that I should remain in the clinic for 15 minutes after receiving the vaccine(s).  _________________________________________________________  Signature of Patient or Parent/Legal Guardian ____________________  Date     "

## 2025-04-08 ENCOUNTER — TELEPHONE (OUTPATIENT)
Dept: INTERNAL MEDICINE | Facility: CLINIC | Age: 80
End: 2025-04-08
Payer: MEDICARE

## 2025-04-08 NOTE — TELEPHONE ENCOUNTER
HUB to relay:  Lab results will be put in mail to the patient.  Dr. Ghotra sent her a result message on her my chart that was not read:  Blood work looks great - the A1c is in the normal range and the glucose is just a few points above normal. The cholesterol levels are good too with a high HDL good cholesterol

## 2025-04-08 NOTE — TELEPHONE ENCOUNTER
Caller: Jacques Yeboah    Relationship: Self    Best call back number: [phone]    What form or medical record are you requesting: COPY OF LAB RESULTS [id number]    Who is requesting this form or medical record from you: PATIENT    How would you like to receive the form or medical records (pick-up, mail, fax):   If mail, what is the address:   [address] [place] [address]

## 2025-04-09 NOTE — TELEPHONE ENCOUNTER
Called and spoke with patient, informed her of results and recommendations from Dr. Ghotra. She verbalized understanding and had no further questions. Lab results were printed and mailed.

## 2025-05-01 DIAGNOSIS — I10 PRIMARY HYPERTENSION: ICD-10-CM

## 2025-05-01 DIAGNOSIS — E78.00 PURE HYPERCHOLESTEROLEMIA: ICD-10-CM

## 2025-05-01 NOTE — TELEPHONE ENCOUNTER
Caller: Jacques Yeboah    Relationship: Self    Best call back number: [phone]     Requested Prescriptions:   Requested Prescriptions     Pending Prescriptions Disp Refills    losartan (COZAAR) 50 MG tablet 90 tablet 0     Sig: Take 1 tablet by mouth Daily.    atorvastatin (LIPITOR) 10 MG tablet 90 tablet 1     Sig: Take 1 tablet by mouth Daily.        Pharmacy where request should be sent: [id number] [address]  AT [address] - [phone] [facility] [phone]      Last office visit with prescribing clinician: 3/27/2025   Last telemedicine visit with prescribing clinician: Visit date not found   Next office visit with prescribing clinician: 9/25/2025     Additional details provided by patient: PATIENT HAS ABOUT 10-12 DAYS LEFT. SHE STATES THE PRESCRIPTION SENT IN FOR LOSARTAN SENT BY DR. NARAYAN WAS NEVER PICKED UP.    Does the patient have less than a 3 day supply:  [] Yes  [x] No    Would you like a call back once the refill request has been completed: [] Yes [x] No    If the office needs to give you a call back, can they leave a voicemail: [] Yes [x] No    Todd Vizcaino Rep   05/01/25 08:32 EDT

## 2025-05-02 RX ORDER — ATORVASTATIN CALCIUM 10 MG/1
10 TABLET, FILM COATED ORAL DAILY
Qty: 90 TABLET | Refills: 1 | Status: SHIPPED | OUTPATIENT
Start: 2025-05-02

## 2025-05-02 RX ORDER — LOSARTAN POTASSIUM 50 MG/1
50 TABLET ORAL DAILY
Qty: 90 TABLET | Refills: 1 | Status: SHIPPED | OUTPATIENT
Start: 2025-05-02

## 2025-08-20 ENCOUNTER — TELEPHONE (OUTPATIENT)
Dept: CARDIOLOGY | Facility: CLINIC | Age: 80
End: 2025-08-20
Payer: MEDICARE

## 2025-08-20 RX ORDER — DILTIAZEM HYDROCHLORIDE 120 MG/1
120 CAPSULE, EXTENDED RELEASE ORAL DAILY
Qty: 90 CAPSULE | Refills: 3 | Status: SHIPPED | OUTPATIENT
Start: 2025-08-20

## (undated) DEVICE — TRY EPID SFTY 18G 3.5IN 1T7680

## (undated) DEVICE — 2963 MEDIPORE SOFT CLOTH TAPE 3 IN X 10 YD 12 RLS/CS: Brand: 3M™ MEDIPORE™

## (undated) DEVICE — CANN NASL CO2 DIVIDED A/

## (undated) DEVICE — PK HIP TOTL UNIV 10

## (undated) DEVICE — SYS SKIN CLS DERMABOND PRINEO W/22CM MESH TP

## (undated) DEVICE — ANTIBACTERIAL UNDYED BRAIDED (POLYGLACTIN 910), SYNTHETIC ABSORBABLE SUTURE: Brand: COATED VICRYL

## (undated) DEVICE — STRYKER PERFORMANCE SERIES SAGITTAL BLADE: Brand: STRYKER PERFORMANCE SERIES

## (undated) DEVICE — SPNG GZ WOVN 4X4IN 12PLY 10/BX STRL

## (undated) DEVICE — SOL LR 1000ML

## (undated) DEVICE — ENCORE® LATEX MICRO SIZE 8.5, STERILE LATEX POWDER-FREE SURGICAL GLOVE: Brand: ENCORE

## (undated) DEVICE — ADAPT ST INFUS ADMIN SYR 70IN

## (undated) DEVICE — GLV SURG SENSICARE W/ALOE PF LF 9 STRL

## (undated) DEVICE — HDRST POSTIN FM CRDL TRACH SLOT 9X8X4IN